# Patient Record
Sex: FEMALE | Race: WHITE | NOT HISPANIC OR LATINO | Employment: UNEMPLOYED | URBAN - METROPOLITAN AREA
[De-identification: names, ages, dates, MRNs, and addresses within clinical notes are randomized per-mention and may not be internally consistent; named-entity substitution may affect disease eponyms.]

---

## 2018-08-06 ENCOUNTER — TRANSFERRED RECORDS (OUTPATIENT)
Dept: HEALTH INFORMATION MANAGEMENT | Facility: CLINIC | Age: 48
End: 2018-08-06

## 2019-08-08 ENCOUNTER — TRANSFERRED RECORDS (OUTPATIENT)
Dept: HEALTH INFORMATION MANAGEMENT | Facility: CLINIC | Age: 49
End: 2019-08-08

## 2019-11-06 ENCOUNTER — TRANSFERRED RECORDS (OUTPATIENT)
Dept: HEALTH INFORMATION MANAGEMENT | Facility: CLINIC | Age: 49
End: 2019-11-06

## 2020-09-08 LAB — PHQ9 SCORE: 19

## 2020-10-07 ENCOUNTER — TRANSFERRED RECORDS (OUTPATIENT)
Dept: HEALTH INFORMATION MANAGEMENT | Facility: CLINIC | Age: 50
End: 2020-10-07

## 2020-10-17 ENCOUNTER — TRANSFERRED RECORDS (OUTPATIENT)
Dept: HEALTH INFORMATION MANAGEMENT | Facility: CLINIC | Age: 50
End: 2020-10-17

## 2020-10-17 LAB
HPV ABSTRACT: NORMAL
PAP-ABSTRACT: NORMAL

## 2020-11-05 ENCOUNTER — MEDICAL CORRESPONDENCE (OUTPATIENT)
Dept: HEALTH INFORMATION MANAGEMENT | Facility: CLINIC | Age: 50
End: 2020-11-05
Payer: COMMERCIAL

## 2020-11-24 ENCOUNTER — TRANSFERRED RECORDS (OUTPATIENT)
Dept: HEALTH INFORMATION MANAGEMENT | Facility: CLINIC | Age: 50
End: 2020-11-24

## 2021-01-07 ENCOUNTER — TRANSFERRED RECORDS (OUTPATIENT)
Dept: HEALTH INFORMATION MANAGEMENT | Facility: CLINIC | Age: 51
End: 2021-01-07

## 2021-01-26 ENCOUNTER — TRANSFERRED RECORDS (OUTPATIENT)
Dept: HEALTH INFORMATION MANAGEMENT | Facility: CLINIC | Age: 51
End: 2021-01-26

## 2021-03-31 ENCOUNTER — TRANSFERRED RECORDS (OUTPATIENT)
Dept: HEALTH INFORMATION MANAGEMENT | Facility: CLINIC | Age: 51
End: 2021-03-31

## 2021-04-13 ENCOUNTER — TRANSFERRED RECORDS (OUTPATIENT)
Dept: HEALTH INFORMATION MANAGEMENT | Facility: CLINIC | Age: 51
End: 2021-04-13

## 2021-06-01 ENCOUNTER — TRANSFERRED RECORDS (OUTPATIENT)
Dept: HEALTH INFORMATION MANAGEMENT | Facility: CLINIC | Age: 51
End: 2021-06-01

## 2021-06-01 LAB — EJECTION FRACTION: NORMAL %

## 2021-06-16 ENCOUNTER — TRANSFERRED RECORDS (OUTPATIENT)
Dept: HEALTH INFORMATION MANAGEMENT | Facility: CLINIC | Age: 51
End: 2021-06-16

## 2021-07-26 ENCOUNTER — OFFICE VISIT (OUTPATIENT)
Dept: FAMILY MEDICINE | Facility: CLINIC | Age: 51
End: 2021-07-26
Payer: COMMERCIAL

## 2021-07-26 VITALS
HEART RATE: 71 BPM | DIASTOLIC BLOOD PRESSURE: 62 MMHG | WEIGHT: 146.13 LBS | HEIGHT: 68 IN | OXYGEN SATURATION: 98 % | BODY MASS INDEX: 22.15 KG/M2 | SYSTOLIC BLOOD PRESSURE: 90 MMHG

## 2021-07-26 DIAGNOSIS — E03.9 ACQUIRED HYPOTHYROIDISM: ICD-10-CM

## 2021-07-26 DIAGNOSIS — G47.33 OSA (OBSTRUCTIVE SLEEP APNEA): ICD-10-CM

## 2021-07-26 DIAGNOSIS — I49.3 PVC'S (PREMATURE VENTRICULAR CONTRACTIONS): ICD-10-CM

## 2021-07-26 DIAGNOSIS — I47.10 SVT (SUPRAVENTRICULAR TACHYCARDIA) (H): ICD-10-CM

## 2021-07-26 DIAGNOSIS — R55 VASOVAGAL SYNCOPE: ICD-10-CM

## 2021-07-26 DIAGNOSIS — F51.01 PRIMARY INSOMNIA: ICD-10-CM

## 2021-07-26 DIAGNOSIS — F41.1 GAD (GENERALIZED ANXIETY DISORDER): ICD-10-CM

## 2021-07-26 DIAGNOSIS — G95.0 SYRINGOMYELIA (H): ICD-10-CM

## 2021-07-26 PROBLEM — L71.9 ROSACEA: Status: ACTIVE | Noted: 2021-07-26

## 2021-07-26 PROBLEM — G47.00 INSOMNIA: Status: ACTIVE | Noted: 2021-07-26

## 2021-07-26 PROBLEM — F45.8 BRUXISM: Status: ACTIVE | Noted: 2021-07-26

## 2021-07-26 PROBLEM — E16.2 HYPOGLYCEMIA: Status: ACTIVE | Noted: 2021-07-26

## 2021-07-26 PROBLEM — N39.3 FEMALE STRESS INCONTINENCE: Status: ACTIVE | Noted: 2020-09-24

## 2021-07-26 PROBLEM — G90.9 AUTONOMIC DYSFUNCTION: Status: ACTIVE | Noted: 2021-07-26

## 2021-07-26 PROBLEM — H43.813 PVD (POSTERIOR VITREOUS DETACHMENT), BILATERAL: Status: ACTIVE | Noted: 2021-07-26

## 2021-07-26 PROBLEM — M72.2 PLANTAR FASCIITIS: Status: ACTIVE | Noted: 2021-07-26

## 2021-07-26 PROBLEM — H93.25 AUDITORY PROCESSING DISORDER: Status: ACTIVE | Noted: 2021-07-26

## 2021-07-26 PROBLEM — K58.9 IRRITABLE BOWEL SYNDROME: Status: ACTIVE | Noted: 2021-07-26

## 2021-07-26 PROBLEM — J30.2 SEASONAL ALLERGIC RHINITIS: Status: ACTIVE | Noted: 2021-07-26

## 2021-07-26 PROCEDURE — 99204 OFFICE O/P NEW MOD 45 MIN: CPT | Performed by: STUDENT IN AN ORGANIZED HEALTH CARE EDUCATION/TRAINING PROGRAM

## 2021-07-26 RX ORDER — PROPRANOLOL HYDROCHLORIDE 10 MG/1
2.5-5 TABLET ORAL 3 TIMES DAILY PRN
COMMUNITY
End: 2021-12-31

## 2021-07-26 RX ORDER — AZELAIC ACID 0.15 G/G
GEL TOPICAL
COMMUNITY
End: 2022-09-29

## 2021-07-26 RX ORDER — HYDROCORTISONE 2.5 %
CREAM (GRAM) TOPICAL
COMMUNITY
End: 2022-09-29

## 2021-07-26 RX ORDER — INFLUENZA VIRUS VACCINE 15; 15; 15; 15 UG/.5ML; UG/.5ML; UG/.5ML; UG/.5ML
SUSPENSION INTRAMUSCULAR
COMMUNITY
End: 2021-10-13

## 2021-07-26 RX ORDER — PROGESTERONE 100 MG/1
100 CAPSULE ORAL DAILY
COMMUNITY

## 2021-07-26 RX ORDER — LEVOMEFOLATE CALCIUM 15 MG
15 TABLET ORAL DAILY
COMMUNITY
End: 2021-11-01

## 2021-07-26 RX ORDER — CHOLECALCIFEROL (VITAMIN D3) 50 MCG
50 TABLET ORAL DAILY
COMMUNITY

## 2021-07-26 RX ORDER — TRAZODONE HYDROCHLORIDE 50 MG/1
TABLET, FILM COATED ORAL
COMMUNITY

## 2021-07-26 RX ORDER — LEVOTHYROXINE SODIUM 112 MCG
112 TABLET ORAL EVERY EVENING
COMMUNITY
Start: 2021-06-19 | End: 2022-04-22

## 2021-07-26 ASSESSMENT — MIFFLIN-ST. JEOR: SCORE: 1326.32

## 2021-07-26 NOTE — PROGRESS NOTES
ASSESSMENT AND PLAN     Carmel was seen today for establish care.    Diagnoses and all orders for this visit:    NAPOLEON (generalized anxiety disorder): Patient establishing with psychiatry for ongoing management.    MICHAEL (obstructive sleep apnea)  Primary insomnia: Patient would like to establish with a sleep medicine provider for ongoing evaluation of sleep apnea.  -     SLEEP EVALUATION & MANAGEMENT REFERRAL - ADULT -; Future    SVT (supraventricular tachycardia) (H)  Vasovagal syncope: Has seen cardiology for supraventricular tachycardia.  -     Adult Cardiology Eval Referral; Future    Acquired hypothyroidism: TSH has been completed in the last year and is normal.    Syringomyelia (H): Patient had recently started physical therapy for shoulder and neck support.  She would like to continue this while she is here.  In addition patient follows with neurology  -     Physical Therapy Referral; Future  -     Adult Neurology Referral; Future    ROIs completed for specialists and primary care.  Will await records to determine when her next primary care visit should be    RTC for routine preventative care or sooner if develops new or worsening symptoms.    Dayana SINGER       Carmel Aguilar is a 50 year old  female with a PMH significant for:     Patient Active Problem List   Diagnosis     Female stress incontinence     NAPOLEON (generalized anxiety disorder)     Insomnia     Autonomic dysfunction     SVT (supraventricular tachycardia) (H)     Syncope     MICHAEL (obstructive sleep apnea)     Hypothyroidism     Irritable bowel syndrome     Syringomyelia (H)     Hypoglycemia     Rosacea     Seasonal allergic rhinitis     Bruxism     PVC's (premature ventricular contractions)     Plantar fasciitis     PVD (posterior vitreous detachment), bilateral     Auditory processing disorder     She presents to establish care.    Patient has no acute questions or concerns today.  She is recently moving back from [place].  She is looking to transfer all of her care here to [place].  Patient has a complex medical history including anxiety, SVT, syncope, autonomic dysfunction, MICHAEL, hypothyroidism, and syringomyelia.  Patient notes she is up-to-date with her current screening exams.  Patient needs referrals to specialists.  Patient has already made a visit with audiology and psychiatry.  Patient takes propranolol for anxiety and SVT.  Patient takes Synthroid.  She recently had her TSH done and notes this was normal.  Patient takes trazodone for insomnia.  Patient takes a multitude of other supplements as recommended by her functional medicine primary care provider.    PMHx, PSHx, Social Hx, Family Hx, Medications, and Allergies reviewed and updated in chart as needed.         REVIEW OF SYSTEMS     Pertinent items are noted in HPI.        OBJECTIVE     Vitals:    07/26/21 1258   BP: 90/62   BP Location: Left arm   Patient Position: Sitting   Cuff Size: Adult Regular   Pulse: 71   SpO2: 98%   Weight: 66.3 kg (146 lb 2 oz)   Height: 1.727 m (5' 8\")     Body mass index is 22.22 kg/m .    Constitutional: Awake, alert, cooperative, no apparent distress, and appears stated age.  Eyes: Lids and lashes normal, sclera clear, conjunctiva normal.  ENT: Normocephalic, without obvious abnormality, atramatic,   Musculoskeletal: No redness, warmth, or swelling of the joints.  Full range of motion noted.   Neurologic: Awake, alert, oriented to name, place and time.  Cranial nerves II-XII are grossly intact and gait is normal.    No results found for any visits on 07/26/21.      "

## 2021-07-26 NOTE — PATIENT INSTRUCTIONS
[facility] (now part of [facility])  [place] [facility]

## 2021-07-29 ENCOUNTER — HOSPITAL ENCOUNTER (OUTPATIENT)
Dept: PHYSICAL THERAPY | Facility: REHABILITATION | Age: 51
End: 2021-07-29
Attending: STUDENT IN AN ORGANIZED HEALTH CARE EDUCATION/TRAINING PROGRAM
Payer: COMMERCIAL

## 2021-07-29 DIAGNOSIS — M95.8 BILATERAL WINGED SCAPULA: ICD-10-CM

## 2021-07-29 DIAGNOSIS — R29.3 POOR POSTURE: Primary | ICD-10-CM

## 2021-07-29 DIAGNOSIS — G95.0 SYRINGOMYELIA (H): ICD-10-CM

## 2021-07-29 PROCEDURE — 97110 THERAPEUTIC EXERCISES: CPT | Mod: GP | Performed by: PHYSICAL THERAPIST

## 2021-07-29 PROCEDURE — 97162 PT EVAL MOD COMPLEX 30 MIN: CPT | Mod: GP | Performed by: PHYSICAL THERAPIST

## 2021-07-29 NOTE — PROGRESS NOTES
"   07/29/21 1300   Signing Clinician's Name / Credentials   Signing clinician's name / credentials Crys Mullins, PT   Session Number   Session Number 1/8   Ortho Goal 1   Goal Description Patient will be independent with HEP and self management of condition in 8 weeks.    Ortho Goal 2   Goal Description patient will demonstrate correct posture in standing/sitting without cues in 6 weeks.    Ortho Goal 3   Goal Description Patient will report 50% decrease in nocturnal arm symptoms in 8 weeks.    Therapeutic Procedure/exercise   Therapeutic Procedures: strength, endurance, ROM, flexibillity minutes (59693) 23   Skilled Intervention ther ex   Treatment Detail scap retraction 10 x 5\", prone LT retraining w/arm lift, 8-10 x 5-10\", rows 10 x orange, pec stretch/corner 5 x 10-15\" , (B) UT stretch    Education   Learner Patient   Readiness Acceptance   Method Booklet/handout;Demonstration   Response Demonstrates Understanding   Plan   Homework initiate HEP.    Home program scap retraction 10 x 5\", prone LT retraining w/arm lift, 8-10 x 5-10\", rows 10 x orange, pec stretch/corner 5 x 10-15\" , (B) UT stretch    Plan for next session Continue with initial POC. Progress with HEP, posture education/strengthening, additional assessment as needed.    Comments   Comments ref provider: Dayana Calle, DO   AMBULATORY CLINICS ONLY-MEDICAL AND TREATMENT DIAGNOSIS   Medical Diagnosis Syringomyelia (H)   PT Diagnosis Syringomyelia, poor posture, scapular winging     "

## 2021-07-29 NOTE — PROGRESS NOTES
21 1300   General Information   Type of Visit Initial OP Ortho PT Evaluation   Start of Care Date 21   Referring Physician Dayana Calle, DO   Patient/Family Goals Statement strength, fewer symptoms   Orders Evaluate and Treat   Date of Order 21   Certification Required? No   Medical Diagnosis Syringomyelia (H)   Surgical/Medical history reviewed Yes  No past medical history on file.  Past Surgical History:   Procedure Laterality Date      SECTION       WISDOM TOOTH EXTRACTION       Patient Active Problem List   Diagnosis     Female stress incontinence     NAPOLEON (generalized anxiety disorder)     Insomnia     Autonomic dysfunction     SVT (supraventricular tachycardia) (H)     Syncope     MICHAEL (obstructive sleep apnea)     Hypothyroidism     Irritable bowel syndrome     Syringomyelia (H)     Hypoglycemia     Rosacea     Seasonal allergic rhinitis     Bruxism     PVC's (premature ventricular contractions)     Plantar fasciitis     PVD (posterior vitreous detachment), bilateral     Auditory processing disorder        Presentation and Etiology   Pertinent history of current problem (include personal factors and/or comorbidities that impact the POC) Patient presents to therapy today with complaints of cold sensation in (B) lower legs, tingling in (B) arms at night. Hx of syringomyelia 12 years ago. Date of onset/duration of symptoms is 12 years ago. Her symptoms have changed over the years. Symptoms are intermittent. Patient reports a chronic history of similar symptoms. Patient describes their previous level of function as not limited. Symptoms worsen with at night. Leg symptoms are intermittent and sporadic/episodic.  Symptoms improve with exercise. Previous PT focusing on posture, strength. 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  Functional limitations: sleeping/arm numbness, occasional sleep disruption every 2 weeks, worse with  stress.    Impairments K. Numbness;R. Other   Impairment comment cold sensation in legs   Pain rating (0-10 point scale) Denies pain   Prior Level of Function   Prior Level of Function-Mobility not limited   Current Level of Function   Patient role/employment history B. Student   Fall Risk Screen   Fall screen completed by PT   Have you fallen 2 or more times in the past year? No   Have you fallen and had an injury in the past year? No   Is patient a fall risk? No   Abuse Screen (yes response referral indicated)   Feels Unsafe at Home or Work/School no   Feels Threatened by Someone no   Does Anyone Try to Keep You From Having Contact with Others or Doing Things Outside Your Home? no   Physical Signs of Abuse Present no   Functional Scales   Functional Scales Other   Other Scales  ROMAN/0/90, NDI 4/100   Lumbar Spine/SI Objective Findings   Ankle Dorsiflexion (L4) Strength 5 (B)   Great Toe Extension (L5) Strength 5 (B)   Ankle Plantar Flexion (S1) Strength ankle Eversion 5/5 (B)    Sensation Testing normal to pin prick (B) A/P lower leg, dorsal/plantar foot   Patellar Tendon Reflexes  normal (B)    Achilles Tendon Reflexes normal (B)    Cervical Spine   Observation (R) handed   Posture mild forward head, decreased thoracic kyphosis, decreased lumbar lordosis, (B) scap winging, (R) shoulder elevated   Cervical Flexion ROM WNL   Cervical Extension ROM WNL   Cervical Right Side Bending ROM 29 deg   Cervical Left Side Bending ROM 28 deg   Cervical Right Rotation ROM WNL   Cervical Left Rotation ROM WNL   Shoulder AROM Screen flex, abd, Ir/ext WNL (B), mod/major (B) scap winging on return with flex, abduction. Overuse of UT with scap retraction.    Cervical Flexibility Comments Mild/mod fascial restrictions of (B) ant upper rib cage, (B) thoracic inlet, cranial base.    Planned Therapy Interventions   Planned Therapy Interventions manual therapy;neuromuscular re-education;ROM;strengthening;stretching   Clinical Impression    Criteria for Skilled Therapeutic Interventions Met yes, treatment indicated   PT Diagnosis Syringomyelia, poor posture, scapular winging   Influenced by the following impairments posture deficits, scapular winging, abnormal scapular rhythm, fascial restrictions ant upper thorax,    Functional limitations due to impairments sleeping, posture    Clinical Presentation Stable/Uncomplicated   Clinical Decision Making (Complexity) Moderate complexity   Therapy Frequency 1 time/week   Predicted Duration of Therapy Intervention (days/wks) 6-8 weeks   Risk & Benefits of therapy have been explained Yes   Patient, Family & other staff in agreement with plan of care Yes   Ortho Goal 1   Goal Description Patient will be independent with HEP and self management of condition in 8 weeks.    Ortho Goal 2   Goal Description patient will demonstrate correct posture in standing/sitting without cues in 6 weeks.    Ortho Goal 3   Goal Description Patient will report 50% decrease in nocturnal arm symptoms in 8 weeks.    Total Evaluation Time   PT Eval, Moderate Complexity Minutes (65324) 40

## 2021-07-31 ENCOUNTER — TELEPHONE (OUTPATIENT)
Dept: FAMILY MEDICINE | Facility: CLINIC | Age: 51
End: 2021-07-31

## 2021-08-23 ENCOUNTER — NURSE TRIAGE (OUTPATIENT)
Dept: NURSING | Facility: CLINIC | Age: 51
End: 2021-08-23

## 2021-08-23 ENCOUNTER — OFFICE VISIT (OUTPATIENT)
Dept: URGENT CARE | Facility: URGENT CARE | Age: 51
End: 2021-08-23
Payer: COMMERCIAL

## 2021-08-23 VITALS
SYSTOLIC BLOOD PRESSURE: 106 MMHG | HEART RATE: 69 BPM | WEIGHT: 146 LBS | BODY MASS INDEX: 22.2 KG/M2 | TEMPERATURE: 99 F | OXYGEN SATURATION: 99 % | DIASTOLIC BLOOD PRESSURE: 69 MMHG

## 2021-08-23 DIAGNOSIS — J01.40 ACUTE NON-RECURRENT PANSINUSITIS: Primary | ICD-10-CM

## 2021-08-23 PROCEDURE — 99213 OFFICE O/P EST LOW 20 MIN: CPT | Performed by: PHYSICIAN ASSISTANT

## 2021-08-23 RX ORDER — GUAIFENESIN 600 MG/1
1200 TABLET, EXTENDED RELEASE ORAL 2 TIMES DAILY
Qty: 40 TABLET | Refills: 0 | Status: SHIPPED | OUTPATIENT
Start: 2021-08-23 | End: 2021-09-02

## 2021-08-23 RX ORDER — PREDNISONE 20 MG/1
TABLET ORAL
Qty: 20 TABLET | Refills: 0 | Status: SHIPPED | OUTPATIENT
Start: 2021-08-23 | End: 2021-09-16

## 2021-08-23 NOTE — PATIENT INSTRUCTIONS
Patient Education     Understanding Acute Rhinosinusitis  Acute rhinosinusitis is when the lining of the inside of the nose and the sinuses becomes irritated and swollen. It is also called sinusitis, or a sinus infection.  Sinuses are air-filled spaces in the skull behind the face. They are kept moist and clean by a lining of mucosa. Things such as pollen, smoke, and chemical fumes can irritate the mucosa. It can then swell up. As a response to irritation, the mucosa makes more mucus and other fluids. Tiny hairlike cilia cover the mucosa. Cilia help carry mucus toward the opening of the sinus. Too much mucus may cause the cilia to stop working. This blocks the sinus opening. A buildup of fluid in the sinuses then causes pain and pressure. It can also cause bacteria to grow in the sinuses.    What causes acute rhinosinusitis?  A sinus infection is most often caused by a virus. You are more likely to get one after having a cold or the flu. In some cases, a sinus infection can be caused by bacteria.  You are at higher risk for a sinus infection if you:    Are older in age    Have structural problems with your sinuses    Smoke or are exposed to secondhand smoke    Are exposed to changes in pressure, such as from flying a lot or deep sea diving    Have asthma or allergies    Have a weak immune system    Have dental disease     Symptoms of acute rhinosinusitis  Symptoms of acute rhinosinusitis often last around 7 to 10 days. If you have a bacterial infection, they may last longer. They may also get better but then worsen. You may have:    Face pain or pressure under the eyes and around the nose    Headache    Fluid draining in the back of the throat (postnasal drip)    Congestion    Drainage that is thick and colored (often green), instead of clear    Cough    Problems with your sense of smell    Ear pain or hearing problems    Fever    Tooth pain    Fatigue  Diagnosing acute rhinosinusitis  Your healthcare provider  will ask about your symptoms and past health. He or she will look at your ears, nose, throat, and sinuses. Imaging tests, such as X-rays, are often not needed.  It can be hard to figure out if a sinus infection is caused by a virus or bacterium. A bacterial infection tends to last longer. Symptoms may also get better but then worsen. Your healthcare provider may take a sample of mucus from your nose to check for bacteria.  Treating acute rhinosinusitis  Most sinus infections will go away within 10 days. Your body will fight off the virus. If your symptoms seem to get better but then worsen, you may have a bacterial infection instead. Your healthcare provider will then give you antibiotics. Take this medicine until it is gone, even if you feel better.  To help ease your symptoms, your healthcare provider may advise:    Over-the-counter pain relievers. Medicines such as acetaminophen or ibuprofen can ease sinus pain. They may also lower a fever.    Nasal washes. Washing your nasal passages with salt water may ease pain and pressure. It can rinse out mucous and other irritants from your sinuses. Your healthcare provider can show you how to do it.    Nasal steroid spray. This prescription medicine can reduce inflammation in your sinuses.    Other medicines. Decongestants, antihistamines, and other nasal sprays may give short-term relief. They may help with congestion. Talk with your healthcare provider before taking these medicines.     Preventing acute rhinosinusitis  You can help prevent a sinus infection with these steps:    Wash your hands well and often.    Stay away from people who have a cold or upper respiratory infection.    Don't smoke. And stay away from secondhand smoke.    Use a humidifier at home.    Make sure you are up-to-date on your vaccines, such as the flu shot.     When to call your healthcare provider  Call your healthcare provider right away if you have any of these:    Fever of 100.4 F (38 C) or  higher, or as directed by your healthcare provider    Pain that gets worse    Symptoms that don t get better, or get worse    New symptoms  Isaiah last reviewed this educational content on 6/1/2019 2000-2021 [employer]. All rights reserved. This information is not intended as a substitute for professional medical care. Always follow your healthcare professional's instructions.

## 2021-08-23 NOTE — PROGRESS NOTES
Acute non-recurrent pansinusitis  - predniSONE (DELTASONE) 20 MG tablet; Take 3 tabs by mouth daily x 3 days, then 2 tabs daily x 3 days, then 1 tab daily x 3 days, then 1/2 tab daily x 3 days.  - guaiFENesin (MUCINEX) 600 MG 12 hr tablet; Take 2 tablets (1,200 mg) by mouth 2 times daily for 10 days  - amoxicillin-clavulanate (AUGMENTIN) 875-125 MG tablet; Take 1 tablet by mouth 2 times daily for 10 days    20 minutes spent on the date of the encounter doing chart review, history and exam, documentation and further activities per the note     See Patient Instructions  Patient Instructions     Patient Education     Understanding Acute Rhinosinusitis  Acute rhinosinusitis is when the lining of the inside of the nose and the sinuses becomes irritated and swollen. It is also called sinusitis, or a sinus infection.  Sinuses are air-filled spaces in the skull behind the face. They are kept moist and clean by a lining of mucosa. Things such as pollen, smoke, and chemical fumes can irritate the mucosa. It can then swell up. As a response to irritation, the mucosa makes more mucus and other fluids. Tiny hairlike cilia cover the mucosa. Cilia help carry mucus toward the opening of the sinus. Too much mucus may cause the cilia to stop working. This blocks the sinus opening. A buildup of fluid in the sinuses then causes pain and pressure. It can also cause bacteria to grow in the sinuses.    What causes acute rhinosinusitis?  A sinus infection is most often caused by a virus. You are more likely to get one after having a cold or the flu. In some cases, a sinus infection can be caused by bacteria.  You are at higher risk for a sinus infection if you:    Are older in age    Have structural problems with your sinuses    Smoke or are exposed to secondhand smoke    Are exposed to changes in pressure, such as from flying a lot or deep sea diving    Have asthma or allergies    Have a weak immune system    Have dental  disease     Symptoms of acute rhinosinusitis  Symptoms of acute rhinosinusitis often last around 7 to 10 days. If you have a bacterial infection, they may last longer. They may also get better but then worsen. You may have:    Face pain or pressure under the eyes and around the nose    Headache    Fluid draining in the back of the throat (postnasal drip)    Congestion    Drainage that is thick and colored (often green), instead of clear    Cough    Problems with your sense of smell    Ear pain or hearing problems    Fever    Tooth pain    Fatigue  Diagnosing acute rhinosinusitis  Your healthcare provider will ask about your symptoms and past health. He or she will look at your ears, nose, throat, and sinuses. Imaging tests, such as X-rays, are often not needed.  It can be hard to figure out if a sinus infection is caused by a virus or bacterium. A bacterial infection tends to last longer. Symptoms may also get better but then worsen. Your healthcare provider may take a sample of mucus from your nose to check for bacteria.  Treating acute rhinosinusitis  Most sinus infections will go away within 10 days. Your body will fight off the virus. If your symptoms seem to get better but then worsen, you may have a bacterial infection instead. Your healthcare provider will then give you antibiotics. Take this medicine until it is gone, even if you feel better.  To help ease your symptoms, your healthcare provider may advise:    Over-the-counter pain relievers. Medicines such as acetaminophen or ibuprofen can ease sinus pain. They may also lower a fever.    Nasal washes. Washing your nasal passages with salt water may ease pain and pressure. It can rinse out mucous and other irritants from your sinuses. Your healthcare provider can show you how to do it.    Nasal steroid spray. This prescription medicine can reduce inflammation in your sinuses.    Other medicines. Decongestants, antihistamines, and other nasal sprays may give  short-term relief. They may help with congestion. Talk with your healthcare provider before taking these medicines.     Preventing acute rhinosinusitis  You can help prevent a sinus infection with these steps:    Wash your hands well and often.    Stay away from people who have a cold or upper respiratory infection.    Don't smoke. And stay away from secondhand smoke.    Use a humidifier at home.    Make sure you are up-to-date on your vaccines, such as the flu shot.     When to call your healthcare provider  Call your healthcare provider right away if you have any of these:    Fever of 100.4 F (38 C) or higher, or as directed by your healthcare provider    Pain that gets worse    Symptoms that don t get better, or get worse    New symptoms  [employer] software last reviewed this educational content on 6/1/2019 2000-2021 [employer]. All rights reserved. This information is not intended as a substitute for professional medical care. Always follow your healthcare professional's instructions.               Merrill Isbell PA-C  [facility]    Subjective   50 year old who presents to clinic today for the following health issues:    Urgent Care and Sinus Problem       HPI     Acute Illness  Acute illness concerns: Ear plugged, nasal drainage, sinus pressure.   Onset/Duration: 2-3 weeks  Symptoms:  Fever: no  Chills/Sweats: no  Headache (location?): YES  Sinus Pressure: YES  Conjunctivitis:  no  Ear Pain: YES: bilateral  Rhinorrhea: YES  Congestion: YES  Sore Throat: no  Cough: no  Wheeze: no  Decreased Appetite: no  Nausea: no  Vomiting: no  Diarrhea: no  Dysuria/Freq.: no  Dysuria or Hematuria: no  Fatigue/Achiness: YES  Sick/Strep Exposure: None known- Possibly at the [employer]   Therapies tried and outcome: Tylenol     Review of Systems   Review of Systems   See HPI     Objective    Temp: 99  F (37.2  C) Temp src: Tympanic BP: 106/69 Pulse: 69     SpO2: 99 %       Physical Exam   Physical  Exam  Constitutional:       General: She is not in acute distress.     Appearance: Normal appearance. She is normal weight. She is not ill-appearing, toxic-appearing or diaphoretic.   HENT:      Head: Normocephalic and atraumatic.      Right Ear: Tympanic membrane, ear canal and external ear normal. There is no impacted cerumen.      Left Ear: Tympanic membrane, ear canal and external ear normal. There is no impacted cerumen.      Nose: Congestion present.      Mouth/Throat:      Mouth: Mucous membranes are moist.      Pharynx: Oropharynx is clear. No oropharyngeal exudate or posterior oropharyngeal erythema.   Eyes:      General:         Right eye: No discharge.         Left eye: No discharge.      Extraocular Movements: Extraocular movements intact.      Conjunctiva/sclera: Conjunctivae normal.      Pupils: Pupils are equal, round, and reactive to light.   Cardiovascular:      Rate and Rhythm: Normal rate.      Pulses: Normal pulses.      Heart sounds: Normal heart sounds. No murmur heard.   No friction rub. No gallop.    Pulmonary:      Effort: Pulmonary effort is normal. No respiratory distress.      Breath sounds: No stridor. No wheezing, rhonchi or rales.   Chest:      Chest wall: No tenderness.   Abdominal:      General: Abdomen is flat. Bowel sounds are normal. There is no distension.      Palpations: Abdomen is soft. There is no mass.      Tenderness: There is no abdominal tenderness. There is no right CVA tenderness, left CVA tenderness, guarding or rebound.      Hernia: No hernia is present.   Musculoskeletal:      Cervical back: Normal range of motion and neck supple. No tenderness.   Lymphadenopathy:      Cervical: No cervical adenopathy.   Neurological:      General: No focal deficit present.      Mental Status: She is alert and oriented to person, place, and time. Mental status is at baseline.      Gait: Gait normal.   Psychiatric:         Mood and Affect: Mood normal.         Behavior: Behavior  normal.         Thought Content: Thought content normal.         Judgment: Judgment normal.          No results found for this or any previous visit (from the past 24 hour(s)).

## 2021-08-23 NOTE — TELEPHONE ENCOUNTER
"Pt called in states she has headache.  Pt is states the pain forehead and behind her eye.  Pt had nasal drainage.  The color of drainage is yellow.  The headache started last 3 weeks.  Her ear is plugged sometimes.  The headache is 5/10 on the scale.  The symptom is come and go.  Has history of allergy.  Has history of migraine headache.  No fever, no stiff neck.  Eye discomfort.  No cold symptoms.  The disposition is to be seen with in 24 hours.  Care advice given per protocol.  The mother states she will take the Pt to the [facility] tomorrow.  Patient agrees with care advice given.   Agreed to call back if he has additional symptoms or questions.      Jesse Munoz Joppa Nurse Advisor 8/23/2021 3:40 PM        Reason for Disposition    [1] Sinus pain of forehead AND [2] yellow or green nasal discharge    Additional Information    Negative: Difficult to awaken or acting confused (e.g., disoriented, slurred speech)    Negative: [1] Weakness of the face, arm or leg on one side of the body AND [2] new onset    Negative: [1] Numbness of the face, arm or leg on one side of the body AND [2] new onset    Negative: [1] Loss of speech or garbled speech AND [2] new onset    Negative: Passed out (i.e., lost consciousness, collapsed and was not responding)    Negative: Sounds like a life-threatening emergency to the triager    Negative: Followed a head injury    Negative: Pregnant    Negative: Postpartum (from 0 to 6 weeks after delivery)    Negative: Traumatic Brain Injury (TBI) is suspected    Negative: Unable to walk, or can only walk with assistance (e.g., requires support)    Negative: Stiff neck (can't touch chin to chest)    Negative: Severe pain in one eye    Negative: [1] Other family members (or roommates) with headaches AND [2] possibility of carbon monoxide exposure    Negative: [1] SEVERE headache (e.g., excruciating) AND [2] \"worst headache\" of life    Negative: [1] SEVERE headache AND [2] sudden-onset (i.e., reaching " maximum intensity within seconds)    Negative: [1] SEVERE headache AND [2] fever    Negative: Loss of vision or double vision (Exception: same as prior migraines)    Negative: [1] Fever > 100.0 F (37.8 C) AND [2] diabetes mellitus or weak immune system (e.g., HIV positive, cancer chemo, splenectomy, organ transplant, chronic steroids)    Negative: Patient sounds very sick or weak to the triager    Negative: [1] SEVERE headache (e.g., excruciating) AND [2] not improved after 2 hours of pain medicine    Negative: [1] Vomiting AND [2] 2 or more times (Exception: similar to previous migraines)    Negative: Fever > 104 F (40 C)    Negative: [1] MODERATE headache (e.g., interferes with normal activities) AND [2] present > 24 hours AND [3] unexplained  (Exceptions: analgesics not tried, typical migraine, or headache part of viral illness)    Negative: [1] New headache AND [2] weak immune system (e.g., HIV positive, cancer chemo, splenectomy, organ transplant, chronic steroids)    Negative: [1] New headache AND [2] age > 50    Protocols used: HEADACHE-A-AH

## 2021-08-24 ENCOUNTER — NURSE TRIAGE (OUTPATIENT)
Dept: NURSING | Facility: CLINIC | Age: 51
End: 2021-08-24

## 2021-08-24 NOTE — TELEPHONE ENCOUNTER
Seen yesterday in  and given medication for sinusitis.   Back of throat there is a red bump, pea sized and redness on the skin flap near uvula is reddened. Clogged ears.   Pain on the right side in the ear on and off and not new. She is asking if the provider saw the red bump yesterday.     Per notes the exam yesterday noted this:Mouth/Throat:      Mouth: Mucous membranes are moist.      Pharynx: Oropharynx is clear. No oropharyngeal exudate or posterior oropharyngeal erythema.   Information reviewed with patient.   Advised if she starts antibiotics to also use a probiotic found in yogurt, sauerkraut or kombucha.    Advised to follow up is not improving.    Rosana Caban RN on 8/24/2021 at 11:08 AM        Additional Information    Information only question and nurse able to answer    Protocols used: INFORMATION ONLY CALL - NO TRIAGE-A-OH

## 2021-08-25 ENCOUNTER — OFFICE VISIT (OUTPATIENT)
Dept: CARDIOLOGY | Facility: CLINIC | Age: 51
End: 2021-08-25
Payer: COMMERCIAL

## 2021-08-25 VITALS
SYSTOLIC BLOOD PRESSURE: 88 MMHG | HEART RATE: 70 BPM | WEIGHT: 145 LBS | OXYGEN SATURATION: 97 % | DIASTOLIC BLOOD PRESSURE: 58 MMHG | BODY MASS INDEX: 22.05 KG/M2

## 2021-08-25 DIAGNOSIS — R55 VASOVAGAL SYNCOPE: ICD-10-CM

## 2021-08-25 DIAGNOSIS — I49.3 PVC'S (PREMATURE VENTRICULAR CONTRACTIONS): ICD-10-CM

## 2021-08-25 DIAGNOSIS — I47.10 SVT (SUPRAVENTRICULAR TACHYCARDIA) (H): ICD-10-CM

## 2021-08-25 PROCEDURE — 99204 OFFICE O/P NEW MOD 45 MIN: CPT | Performed by: INTERNAL MEDICINE

## 2021-08-25 NOTE — PATIENT INSTRUCTIONS
It was a pleasure seeing you at [facility] today.        Here are my suggestions for your care:    1. Brisk walk 30 minutes daily    2. Drink at least 2 liters of water per day    3. Sign release form so we can get records from [place]      Let's meet again in 6 months.    You can always call my nurse Terese Flores RN who is a nurse helping me in the care of my patients. She can be reached at [phone] if you have any questions.    For scheduling, please call my  Nat Cano at [phone].    Thank you again for trusting me with your care. Please feel free to call my office at any time if you have any question or if I can assist you in any way.    Arti Escobar MD  [facility]

## 2021-08-25 NOTE — LETTER
8/25/2021    Dayana Calle, DO  [address] [address]    RE: Carmel Aguilar       Dear Colleague,    I had the pleasure of seeing Carmel Aguilar in the [facility].      HEART CARE ENCOUNTER CONSULTATON NOTE      [facility]  [phone]      Assessment/Recommendations   Assessment/Plan:  SVT - will get records from [place]. It seems like her palpitations are minimally symptomatic. No change to her current propranolol regimen for now.    Venous insufficiency - compression socks prn, exercise    Risk modification - recommended 30 minutes of cardiovascular exercise daily and a mediterranean diet    F/U 1 year       History of Present Illness/Subjective    HPI: Carmel Aguilar is a 50 year old female with NAPOLEON, mild MICHAEL, and a history of vasovagal reactions without syncope who comes to establish with cardiology today. Carmel recently moved from [place] after getting a divorce. She is originally from [place] and has a brother who lives in [place]. 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 Carmel notes that around 2019 she started seeing a cardiologist in [place], Dr. Grant Soto ([phone]). She initially went to see him after an EKG showed an abnormal interval, per her recall. A holter showed SVT and she reports a normal echocardiogram. Dr. Soto was not worried about her SVT and followed with her annually. Carmel has been on propranolol for her anxiety and feels like it helps with her palpitations as well. She notes brief, a few seconds, of palpitations a few times per month. There are no associated symptoms. She has not been exercising as much with the move and school and feels like she is getting out of shape.     Carmel denies syncope, chest pain, pnd/orthopnea. She does have some mild dependent edema and will wear compression socks from time to time.        Physical Examination  Review of  Systems   Vitals: BP (!) 88/58 (BP Location: Right arm)   Pulse 70   Wt 65.8 kg (145 lb)   SpO2 97%   BMI 22.05 kg/m    BMI= Body mass index is 22.05 kg/m .  Wt Readings from Last 3 Encounters:   21 65.8 kg (145 lb)   21 66.2 kg (146 lb)   21 66.3 kg (146 lb 2 oz)       General Appearance:   no distress, normal body habitus   ENT/Mouth: membranes moist, no oral lesions or bleeding gums.      EYES:  no scleral icterus, normal conjunctivae   Neck: no carotid bruits or thyromegaly   Chest/Lungs:   lungs are clear to auscultation, no rales or wheezing, no sternal scar, equal chest wall expansion    Cardiovascular:   Regular. Normal first and second heart sounds with no murmur. No rubs or gallops; the right carotid, radial and posterior tibial pulses are intact and the left carotid, radial and posterior tibial pulses are intact.  Jugular venous pressure is flat, trace edema bilaterally    Abdomen:  no organomegaly, masses, bruits, or tenderness; bowel sounds are present   Extremities: no cyanosis or clubbing   Skin: no xanthelasma, warm.    Neurologic: normal  bilateral, no tremors     Psychiatric: alert and oriented x3, calm        Please refer above for cardiac ROS details.        Medical History  Surgical History Family History Social History   Past Medical History:   Diagnosis Date     NAPOLEON (generalized anxiety disorder)      Hypothyroid      IBS (irritable bowel syndrome)      mild MICHAEL (obstructive sleep apnea)      SVT (supraventricular tachycardia) (H)      Vasovagal reaction      Past Surgical History:   Procedure Laterality Date      SECTION       WISDOM TOOTH EXTRACTION       Family History   Problem Relation Age of Onset     Atrial fibrillation Father      Cerebrovascular Disease Father      Dementia Father      Ovarian Cancer Mother      Hypertension Mother      Sleep Apnea Brother      Myocardial Infarction Maternal Grandmother      Cancer Paternal Grandfather      Colon  Cancer No family hx of      Breast Cancer No family hx of         Social History     Socioeconomic History     Marital status:      Spouse name: Not on file     Number of children: Not on file     Years of education: Not on file     Highest education level: Not on file   Occupational History     Occupation: homemaker     Occupation: student:    Tobacco Use     Smoking status: Never Smoker     Smokeless tobacco: Never Used   Substance and Sexual Activity     Alcohol use: Not Currently     Drug use: Not Currently     Sexual activity: Not Currently   Other Topics Concern     Parent/sibling w/ CABG, MI or angioplasty before 65F 55M? Not Asked   Social History Narrative     Not on file     Social Determinants of Health     Financial Resource Strain:      Difficulty of Paying Living Expenses:    Food Insecurity:      Worried About Running Out of Food in the Last Year:      Ran Out of Food in the Last Year:    Transportation Needs:      Lack of Transportation (Medical):      Lack of Transportation (Non-Medical):    Physical Activity:      Days of Exercise per Week:      Minutes of Exercise per Session:    Stress:      Feeling of Stress :    Social Connections:      Frequency of Communication with Friends and Family:      Frequency of Social Gatherings with Friends and Family:      Attends Christian Services:      Active Member of Clubs or Organizations:      Attends Club or Organization Meetings:      Marital Status:    Intimate Partner Violence:      Fear of Current or Ex-Partner:      Emotionally Abused:      Physically Abused:      Sexually Abused:            Medications  Allergies   Current Outpatient Medications   Medication Sig Dispense Refill     amoxicillin-clavulanate (AUGMENTIN) 875-125 MG tablet Take 1 tablet by mouth 2 times daily for 10 days 20 tablet 0     azelaic acid (FINACIA) 15 % external gel azelaic acid 15 % topical gel   APPLY TO AFFECTED AREA EVERY DAY       guaiFENesin (MUCINEX)  600 MG 12 hr tablet Take 2 tablets (1,200 mg) by mouth 2 times daily for 10 days (Patient taking differently: Take 1,200 mg by mouth 2 times daily Hasn't started) 40 tablet 0     hydrocortisone 2.5 % cream hydrocortisone 2.5 % topical cream       L-Methylfolate (ELFOLATE) 15 MG TABS Take 15 mg by mouth daily       MAGNESIUM GLYCINATE PO Take by mouth daily        progesterone (PROMETRIUM) 100 MG capsule Take 100 mg by mouth daily Takes at dinner       propranolol (INDERAL) 10 MG tablet Take 2.5-5 mg by mouth 3 times daily as needed        SYNTHROID 112 MCG tablet Take 112 mcg by mouth every evening        traZODone (DESYREL) 50 MG tablet trazodone 50 mg tablet   TAKE 1/2 TO 1 TABLET BY MOUTH AT BEDTIME       UNABLE TO FIND MEDICATION NAME: EFA-Sirt Supreme dietary supplements       UNABLE TO FIND MEDICATION NAME: Bi-estrogen (compound) BID       UNABLE TO FIND MEDICATION NAME: Zinc & Copper 15mg       vitamin D3 (VITAMIN D3) 50 mcg (2000 units) tablet Take 50 mcg by mouth daily 2000 units        influenza quadrivalent, PF, vacc (FLULAVAL QUADRIVALENT) 0.5 ML injection Flulaval Quad [id number] (PF) 60 mcg (15 mcg x 4)/0.5 mL IM syringe (Patient not taking: Reported on 8/25/2021)       predniSONE (DELTASONE) 20 MG tablet Take 3 tabs by mouth daily x 3 days, then 2 tabs daily x 3 days, then 1 tab daily x 3 days, then 1/2 tab daily x 3 days. (Patient not taking: Reported on 8/25/2021) 20 tablet 0       Allergies   Allergen Reactions     Melon      Oral reaction      Mold      Morphine Nausea and Vomiting     Nsaids Hives     Other Environmental Allergy      Tomato Hives          Lab Results    Chemistry/lipid CBC Cardiac Enzymes/BNP/TSH/INR   No results for input(s): CHOL, HDL, LDL, TRIG, CHOLHDLRATIO in the last 14523 hours.  No results for input(s): LDL in the last 14216 hours.  No results for input(s): NA, POTASSIUM, CHLORIDE, CO2, GLC, BUN, CR, GFRESTIMATED, ERICH in the last 74410 hours.    Invalid input(s):  GRFESTBLACK  No results for input(s): CR in the last 78463 hours.  No results for input(s): A1C in the last 14915 hours.       No results for input(s): WBC, HGB, HCT, MCV, PLT in the last 46570 hours.  No results for input(s): HGB in the last 45043 hours. No results for input(s): TROPONINI in the last 48797 hours.  No results for input(s): BNP, NTBNPI, NTBNP in the last 46923 hours.  No results for input(s): TSH in the last 59866 hours.  No results for input(s): INR in the last 86834 hours.     Arti Escobar MD                                        Thank you for allowing me to participate in the care of your patient.      Sincerely,     Arti Escobar MD     [facility]  cc:   Dayana Calle, DO  [address] [id number]

## 2021-08-25 NOTE — PROGRESS NOTES
HEART CARE ENCOUNTER CONSULTATON NOTE      [facility]  [phone]      Assessment/Recommendations   Assessment/Plan:  SVT - will get records from [place]. It seems like her palpitations are minimally symptomatic. No change to her current propranolol regimen for now.    Venous insufficiency - compression socks prn, exercise    Risk modification - recommended 30 minutes of cardiovascular exercise daily and a mediterranean diet    F/U 1 year       History of Present Illness/Subjective    HPI: Carmel Aguilar is a 50 year old female with NAPOLEON, mild MICHAEL, and a history of vasovagal reactions without syncope who comes to establish with cardiology today. Carmel recently moved from [place] after getting a divorce. She is originally from [place] and has a brother who lives in [place]. 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 Carmel notes that around 2019 she started seeing a cardiologist in [place], Dr. Grant Soto ([phone]). She initially went to see him after an EKG showed an abnormal interval, per her recall. A holter showed SVT and she reports a normal echocardiogram. Dr. Soto was not worried about her SVT and followed with her annually. Carmel has been on propranolol for her anxiety and feels like it helps with her palpitations as well. She notes brief, a few seconds, of palpitations a few times per month. There are no associated symptoms. She has not been exercising as much with the move and school and feels like she is getting out of shape.     Carmel denies syncope, chest pain, pnd/orthopnea. She does have some mild dependent edema and will wear compression socks from time to time.        Physical Examination  Review of Systems   Vitals: BP (!) 88/58 (BP Location: Right arm)   Pulse 70   Wt 65.8 kg (145 lb)   SpO2 97%   BMI 22.05 kg/m    BMI= Body mass index is 22.05 kg/m .  Wt Readings from Last 3 Encounters:   08/25/21 65.8 kg (145 lb)   08/23/21 66.2 kg (146 lb)    21 66.3 kg (146 lb 2 oz)       General Appearance:   no distress, normal body habitus   ENT/Mouth: membranes moist, no oral lesions or bleeding gums.      EYES:  no scleral icterus, normal conjunctivae   Neck: no carotid bruits or thyromegaly   Chest/Lungs:   lungs are clear to auscultation, no rales or wheezing, no sternal scar, equal chest wall expansion    Cardiovascular:   Regular. Normal first and second heart sounds with no murmur. No rubs or gallops; the right carotid, radial and posterior tibial pulses are intact and the left carotid, radial and posterior tibial pulses are intact.  Jugular venous pressure is flat, trace edema bilaterally    Abdomen:  no organomegaly, masses, bruits, or tenderness; bowel sounds are present   Extremities: no cyanosis or clubbing   Skin: no xanthelasma, warm.    Neurologic: normal  bilateral, no tremors     Psychiatric: alert and oriented x3, calm        Please refer above for cardiac ROS details.        Medical History  Surgical History Family History Social History   Past Medical History:   Diagnosis Date     NAPOLEON (generalized anxiety disorder)      Hypothyroid      IBS (irritable bowel syndrome)      mild MICHAEL (obstructive sleep apnea)      SVT (supraventricular tachycardia) (H)      Vasovagal reaction      Past Surgical History:   Procedure Laterality Date      SECTION       WISDOM TOOTH EXTRACTION       Family History   Problem Relation Age of Onset     Atrial fibrillation Father      Cerebrovascular Disease Father      Dementia Father      Ovarian Cancer Mother      Hypertension Mother      Sleep Apnea Brother      Myocardial Infarction Maternal Grandmother      Cancer Paternal Grandfather      Colon Cancer No family hx of      Breast Cancer No family hx of         Social History     Socioeconomic History     Marital status:      Spouse name: Not on file     Number of children: Not on file     Years of education: Not on file     Highest education  level: Not on file   Occupational History     Occupation: homemaker     Occupation: student:    Tobacco Use     Smoking status: Never Smoker     Smokeless tobacco: Never Used   Substance and Sexual Activity     Alcohol use: Not Currently     Drug use: Not Currently     Sexual activity: Not Currently   Other Topics Concern     Parent/sibling w/ CABG, MI or angioplasty before 65F 55M? Not Asked   Social History Narrative     Not on file     Social Determinants of Health     Financial Resource Strain:      Difficulty of Paying Living Expenses:    Food Insecurity:      Worried About Running Out of Food in the Last Year:      Ran Out of Food in the Last Year:    Transportation Needs:      Lack of Transportation (Medical):      Lack of Transportation (Non-Medical):    Physical Activity:      Days of Exercise per Week:      Minutes of Exercise per Session:    Stress:      Feeling of Stress :    Social Connections:      Frequency of Communication with Friends and Family:      Frequency of Social Gatherings with Friends and Family:      Attends Hinduism Services:      Active Member of Clubs or Organizations:      Attends Club or Organization Meetings:      Marital Status:    Intimate Partner Violence:      Fear of Current or Ex-Partner:      Emotionally Abused:      Physically Abused:      Sexually Abused:            Medications  Allergies   Current Outpatient Medications   Medication Sig Dispense Refill     amoxicillin-clavulanate (AUGMENTIN) 875-125 MG tablet Take 1 tablet by mouth 2 times daily for 10 days 20 tablet 0     azelaic acid (FINACIA) 15 % external gel azelaic acid 15 % topical gel   APPLY TO AFFECTED AREA EVERY DAY       guaiFENesin (MUCINEX) 600 MG 12 hr tablet Take 2 tablets (1,200 mg) by mouth 2 times daily for 10 days (Patient taking differently: Take 1,200 mg by mouth 2 times daily Hasn't started) 40 tablet 0     hydrocortisone 2.5 % cream hydrocortisone 2.5 % topical cream       L-Methylfolate  (ELFOLATE) 15 MG TABS Take 15 mg by mouth daily       MAGNESIUM GLYCINATE PO Take by mouth daily        progesterone (PROMETRIUM) 100 MG capsule Take 100 mg by mouth daily Takes at dinner       propranolol (INDERAL) 10 MG tablet Take 2.5-5 mg by mouth 3 times daily as needed        SYNTHROID 112 MCG tablet Take 112 mcg by mouth every evening        traZODone (DESYREL) 50 MG tablet trazodone 50 mg tablet   TAKE 1/2 TO 1 TABLET BY MOUTH AT BEDTIME       UNABLE TO FIND MEDICATION NAME: EFA-Sirt Supreme dietary supplements       UNABLE TO FIND MEDICATION NAME: Bi-estrogen (compound) BID       UNABLE TO FIND MEDICATION NAME: Zinc & Copper 15mg       vitamin D3 (VITAMIN D3) 50 mcg (2000 units) tablet Take 50 mcg by mouth daily 2000 units        influenza quadrivalent, PF, vacc (FLULAVAL QUADRIVALENT) 0.5 ML injection Flulaval Quad [id number] (PF) 60 mcg (15 mcg x 4)/0.5 mL IM syringe (Patient not taking: Reported on 8/25/2021)       predniSONE (DELTASONE) 20 MG tablet Take 3 tabs by mouth daily x 3 days, then 2 tabs daily x 3 days, then 1 tab daily x 3 days, then 1/2 tab daily x 3 days. (Patient not taking: Reported on 8/25/2021) 20 tablet 0       Allergies   Allergen Reactions     Melon      Oral reaction      Mold      Morphine Nausea and Vomiting     Nsaids Hives     Other Environmental Allergy      Tomato Hives          Lab Results    Chemistry/lipid CBC Cardiac Enzymes/BNP/TSH/INR   No results for input(s): CHOL, HDL, LDL, TRIG, CHOLHDLRATIO in the last 81391 hours.  No results for input(s): LDL in the last 20216 hours.  No results for input(s): NA, POTASSIUM, CHLORIDE, CO2, GLC, BUN, CR, GFRESTIMATED, ERICH in the last 20888 hours.    Invalid input(s): GRFESTBLACK  No results for input(s): CR in the last 11320 hours.  No results for input(s): A1C in the last 21684 hours.       No results for input(s): WBC, HGB, HCT, MCV, PLT in the last 92571 hours.  No results for input(s): HGB in the last 38383 hours. No results for  input(s): TROPONINI in the last 32000 hours.  No results for input(s): BNP, NTBNPI, NTBNP in the last 99502 hours.  No results for input(s): TSH in the last 93818 hours.  No results for input(s): INR in the last 66106 hours.     Arti Escobar MD

## 2021-08-27 ENCOUNTER — HOSPITAL ENCOUNTER (OUTPATIENT)
Dept: PHYSICAL THERAPY | Facility: REHABILITATION | Age: 51
End: 2021-08-27
Payer: COMMERCIAL

## 2021-08-27 DIAGNOSIS — R29.3 POOR POSTURE: ICD-10-CM

## 2021-08-27 DIAGNOSIS — G95.0 SYRINGOMYELIA (H): Primary | ICD-10-CM

## 2021-08-27 DIAGNOSIS — M95.8 BILATERAL WINGED SCAPULA: ICD-10-CM

## 2021-08-27 PROCEDURE — 97110 THERAPEUTIC EXERCISES: CPT | Mod: GP | Performed by: PHYSICAL THERAPIST

## 2021-09-05 ENCOUNTER — HEALTH MAINTENANCE LETTER (OUTPATIENT)
Age: 51
End: 2021-09-05

## 2021-09-09 ENCOUNTER — TELEPHONE (OUTPATIENT)
Dept: SLEEP MEDICINE | Facility: CLINIC | Age: 51
End: 2021-09-09

## 2021-09-09 NOTE — TELEPHONE ENCOUNTER
Request for sleep study records have been faxed today to [facility] today: Consultation is scheduled 21 with Dr. Chung    [facility]:   Phone: [phone]  Fax # [phone]    HST: 20  Titration PS20      Edita MENDOZA CMA, SLEEP MEDICINE, 2021 10:49 AM

## 2021-09-09 NOTE — TELEPHONE ENCOUNTER
Titration sleep study report has been received/scanned into chart today. I have sent another request for HST report from 9/4/20.     Edita MENDOZA CMA, SLEEP MEDICINE, 9/9/2021 3:05 PM

## 2021-09-13 ASSESSMENT — ENCOUNTER SYMPTOMS
DECREASED CONCENTRATION: 1
CHILLS: 0
FATIGUE: 1
HEMATURIA: 0
STIFFNESS: 1
BLOATING: 1
SYNCOPE: 0
TINGLING: 1
DYSURIA: 0
SORE THROAT: 0
VOMITING: 0
SINUS CONGESTION: 1
ORTHOPNEA: 0
DIZZINESS: 1
DISTURBANCES IN COORDINATION: 1
PALPITATIONS: 1
INCREASED ENERGY: 1
SEIZURES: 0
RECTAL PAIN: 0
HYPOTENSION: 0
ARTHRALGIAS: 1
TROUBLE SWALLOWING: 0
ALTERED TEMPERATURE REGULATION: 1
POLYDIPSIA: 0
HYPERTENSION: 0
DEPRESSION: 1
BACK PAIN: 0
PANIC: 0
JAUNDICE: 0
WEAKNESS: 0
MEMORY LOSS: 0
TREMORS: 0
ABDOMINAL PAIN: 0
MUSCLE CRAMPS: 0
HALLUCINATIONS: 0
PARALYSIS: 0
TASTE DISTURBANCE: 0
DIFFICULTY URINATING: 1
NERVOUS/ANXIOUS: 1
LOSS OF CONSCIOUSNESS: 0
FEVER: 0
INSOMNIA: 1
HOARSE VOICE: 0
FLANK PAIN: 0
NAUSEA: 1
DECREASED APPETITE: 0
HEADACHES: 0
SMELL DISTURBANCE: 1
LIGHT-HEADEDNESS: 1
WEIGHT GAIN: 0
NIGHT SWEATS: 1
BOWEL INCONTINENCE: 1
LEG PAIN: 1
CONSTIPATION: 1
MYALGIAS: 0
EYE IRRITATION: 1
EYE PAIN: 1
BLOOD IN STOOL: 0
NUMBNESS: 1
EYE WATERING: 0
WEIGHT LOSS: 0
SINUS PAIN: 0
POLYPHAGIA: 1
NECK MASS: 0
MUSCLE WEAKNESS: 0
SPEECH CHANGE: 0
NECK PAIN: 1
EXERCISE INTOLERANCE: 1
DIARRHEA: 0
DOUBLE VISION: 0
HEARTBURN: 1
EYE REDNESS: 1
SLEEP DISTURBANCES DUE TO BREATHING: 0

## 2021-09-13 ASSESSMENT — SLEEP AND FATIGUE QUESTIONNAIRES
HOW LIKELY ARE YOU TO NOD OFF OR FALL ASLEEP WHILE LYING DOWN TO REST IN THE AFTERNOON WHEN CIRCUMSTANCES PERMIT: SLIGHT CHANCE OF DOZING
HOW LIKELY ARE YOU TO NOD OFF OR FALL ASLEEP WHILE SITTING AND READING: SLIGHT CHANCE OF DOZING
HOW LIKELY ARE YOU TO NOD OFF OR FALL ASLEEP WHILE SITTING AND TALKING TO SOMEONE: WOULD NEVER DOZE
HOW LIKELY ARE YOU TO NOD OFF OR FALL ASLEEP IN A CAR, WHILE STOPPED FOR A FEW MINUTES IN TRAFFIC: WOULD NEVER DOZE
HOW LIKELY ARE YOU TO NOD OFF OR FALL ASLEEP WHILE SITTING QUIETLY AFTER LUNCH WITHOUT ALCOHOL: WOULD NEVER DOZE
HOW LIKELY ARE YOU TO NOD OFF OR FALL ASLEEP WHILE SITTING INACTIVE IN A PUBLIC PLACE: WOULD NEVER DOZE
HOW LIKELY ARE YOU TO NOD OFF OR FALL ASLEEP WHILE WATCHING TV: WOULD NEVER DOZE
HOW LIKELY ARE YOU TO NOD OFF OR FALL ASLEEP WHEN YOU ARE A PASSENGER IN A CAR FOR AN HOUR WITHOUT A BREAK: WOULD NEVER DOZE

## 2021-09-16 ENCOUNTER — VIRTUAL VISIT (OUTPATIENT)
Dept: SLEEP MEDICINE | Facility: CLINIC | Age: 51
End: 2021-09-16
Attending: STUDENT IN AN ORGANIZED HEALTH CARE EDUCATION/TRAINING PROGRAM
Payer: COMMERCIAL

## 2021-09-16 ENCOUNTER — CARE COORDINATION (OUTPATIENT)
Dept: SLEEP MEDICINE | Facility: CLINIC | Age: 51
End: 2021-09-16

## 2021-09-16 VITALS — WEIGHT: 145 LBS | BODY MASS INDEX: 21.98 KG/M2 | HEIGHT: 68 IN

## 2021-09-16 DIAGNOSIS — G47.33 OSA (OBSTRUCTIVE SLEEP APNEA): ICD-10-CM

## 2021-09-16 DIAGNOSIS — G47.00 INSOMNIA, UNSPECIFIED TYPE: Primary | ICD-10-CM

## 2021-09-16 PROCEDURE — 99205 OFFICE O/P NEW HI 60 MIN: CPT | Mod: GT | Performed by: INTERNAL MEDICINE

## 2021-09-16 ASSESSMENT — MIFFLIN-ST. JEOR: SCORE: 1325.19

## 2021-09-16 NOTE — PATIENT INSTRUCTIONS
Our Fax ar Sleep [place] [phone] Please get the home sleep test interpretation and report from [place] 9/4/2020        Insomnia and Behavioral Sleep Medicine Program    [facility] Insomnia and Behavioral Sleep Medicine Program provides non-drug treatment for sleep problems including:      Cognitive-behavioral Therapies for Insomnia (CBT-I)    Management of Shift-work and Jet Lag    Management of Delayed, Advanced and Irregular Circadian Rhythm Sleep Disorders    Imagery Rehearsal Therapy (IRT) for Nightmare Disorder    PAP Therapy Desensitization    You have been referred for consultation with a sleep psychologist who specializes in behavioral sleep medicine and treatment of insomnia.  The [facility] Insomnia and Behavioral Sleep Medicine Program offers individualized telehealth services through our [facility] and online CBT-I.    Preparing for your Consultation:    You will need to keep a Sleep Diary for at least a week prior to your visit.  Simply download he CBTi- timothy on your mobile devise and enter your sleep estimates in the My Sleep section each day.  You can also use the paper sleep diary provided. Your estimates should be your recollection of your last night's sleep.  We recommend you DO watch the clock or gather data during the night.      OR        Make sure to have your CBTI  Timothy or paper sleep diary data available to review during consultation with your sleep provider.  You can also e-mail your sleep diary information to [email or web address] or fax it to [phone].    Frequently Asked Questions    What is CBT-I?    Cognitive Behavioral Therapy for Insomnia, also known as CBT-I, is a highly effective non-drug treatment for insomnia. The American College of Physicians recommends CBT-I as the first treatment for chronic insomnia.  Research has shown CBT-I to be safer and more effective long term than sleeping pills.    What does CBT-I involve?      CBT-I targets behaviors that lead to chronic insomnia:    Habits that weaken the bed as a cue for sleep    Habits that weaken your body's sleep drive and sleep/wake clock     Unhelpful sleep thoughts that increase sleep-related worry and arousal.    The process works like a 4-6 session training program that provides you with the information and coaching needed to implement proven strategies to get a better night's sleep.    People often see improvement in their sleep within a few weeks. Research shows if you keep practicing the skills you learn your sleep is likely to continue to improve 6-12 months after treatment.    Does this program prescribe or manage sleep medication?    No.  Your prescribing provider is responsible to assist you in managing your sleep medications.  Some people choose to stop using sleep medication prior to or during CBT-I.  Our program can work with your prescribing provider to help reduce or eliminate use of sleep medications.     Can I Get Started Today?    Yes, you Can!  With our Online CBT-I program.     Research indicates that online CBT-I can be as effective as in-person therapy for motivated patients. It requires comfort with online learning and confidence that making changes in sleep habits can improve your sleep. Different from other online CBT-I programs, our insomnia program incorporates virtual visit follow-up. Our online CBT-I program is formatted for use on computers and mobile devices.  The cost for an entire 6 week program is $40.           Online CBT-I is not for everyone.  Contraindications include individuals with seizure disorders, bipolar disorder, unstable medical or mental health conditions, risk of falling, or are pregnant.    If you opt to try online CBT-I, we ask that you sign a consent for the [facility] to share your online CBT-I information with our sleep program.  To optimize care coordination, we ask that you also agree to share data from your daily sleep  diaries. This can be done by entering the sharing code Memoright.    To get started, copy and past the link below which will take you to the landing page to register:            [email or web address]                                 Are there any recommended self-help workbooks?    Yes!  We recommend you consider:    Say Dulce to Insomnia:The Six-Week, Drug-Free Program Developed At [facility].  Jason Clinton MD. Available in paperback, Tracy and audiobook.        Overcoming Insomnia: A Cognitive-Behavioral Therapy Approach, Workbook.  Victor Manuel Goss, PhD  and Magnolia Bryant, PhD.  Available in paperback and Tracy.        Quiet Your Mind and Get to Sleep: Solutions to Insomnia for Those with Depression, Anxiety, or Chronic Pain.  Katelyn Lawton, PhD and Magnolia Bryant, PhD.  Available in paperback and Tracy                [facility] Sleep Medicine Dentists  Search engine: https://mms.aadsm.org/members/directory/search_bootstrap.php?org_id=ADSM&   Certified in Dental Sleep Medicine    Son Tovar  Degree: DDS  [address]  [address]  [place] [address]  Professional Phone: [phone]  Website: [email or web address].[email or web address]    Canelo Vaughan  Degree: DDS  [facility]  [address]  [address]  [place] [address]  Professional Phone: [phone]: [phone]    Viji Candelario  [facility]  [address]  [place] [address]  Professional Phone: [phone]  Website: [email or web address]      Melvin Reese  Degree: DDS  [address]  [place] [address]  Professional Phone: [phone]  Fax: [phone]    Jose Hudson  Degree: DDS  [facility]  [address]  [place] [address]  Professional Phone: [phone]  Website: [email or web address]      Stacy Chung  [facility]  [address]  [address]  [place] [address]  Professional Phone: [phone]  Website: [email or web address]      Harper Magaña  Degree: DDS  [facility]  [address]  [place] [address]  Professional Phone: [phone]     Karen Parker  Degree: DDS, PhD  [facility]  [address] [place] [address] [address] [place] [address]   [address]   [address]   [place] [address]   Appointments: [phone]   Fax: [phone]   [facility] [place]   [address]   [place] [address]   Appointments: [phone]   Fax: [phone]                Devon Guillen  Degree: DDS  [address] [place] [address]  Professional Phone: [phone]  Fax: [phone]  Website: [email or web address]      Rajat Gaviria  Degree: DDS  [facility]  [address] [place] [address]    Mariona Mulet Pradera  Degree: KAROLINAS, MS  [facility]  [address] [place] [address]  Professional Phone: [phone]      Ximena Fairchild  Degree: MS ANA  [facility]  [address]  [place] [address]  Professional Phone: [phone]    Aubrie Dudley  Degree: DDS  [facility]  [address]  [address]  [place] [address]  [place] Office     Lm Dubois  Degree: DDS  [facility]  [address] [place] [address]  Professional Phone: [phone]  Website: [email or web address]      Vinicio Wynne  Degree: DDS  [facility]  [address] [place] [address]  Professional Phone: [phone]  Fax: [phone]      Celso Martin  Degree: ANA  [facility]  [address]  [address]  [place] [address]                 ACCEPT MEDICARE  Abdelrahman Powell, ANA  [address], [place] [address]  [phone]; [phone] (fax)  [facility] [place]    Antelmo Cary DDS, MS   [facility]   [address]   [address]   [place] [address]   Appointments: [phone]   Fax: [phone]     Walt Martínez DDS  [address]  [place] [address]  Appointments [phone]  Fax: [phone]    ADDITIONAL PROVIDERS  Main Amin DDS   [facility]   [address]   [address]   [place] [address]   Appointments: [phone]   Fax: [phone]       Donnell Scanlon DDS, MS   [facility]  [address] [place] [address] [address]  [place] [address]   Appointments: [phone] Ext: [phone]  Fax: [phone]   [email or web address]      For MICHAEL: BESIDES CPAP, WHAT OTHER THERAPIES ARE THERE?    Positioning Device  Positioning devices are generally used when sleep apnea is mild and only occurs on your back.This example shows a pillow that straps around the waist. It may be appropriate for those whose sleep study shows milder sleep apnea that occurs primarily when lying flat on one's back. Preliminary studies have shown benefit but effectiveness at home may need to be verified by a home sleep test. These devices are generally not covered by medical insurance.  Examples of devices that maintain sleeping on the back to prevent snoring and mild sleep apnea.    Belt type body positioner  http://Sparkbuyosa.com/    Electronic reminder  http://nightshifttherapy.com/  http://www.buzzpod.com.au/      Oral Appliance  What is oral appliance therapy?  An oral appliance device fits on your teeth at night like a retainer used after having braces. The device is made by a specialized dentist and requires several visits over 1-2 months before a manufactured device is made to fit your teeth and is adjusted to prevent your sleep apnea. Once an oral device is working properly, snoring should be improved. A home sleep test  may be recommended at that time if to determine whether the sleep apnea is adequately treated.       Some things to remember:  -Oral devices are often, but not always, covered by your medical insurance. Be sure to check with your insurance provider.   -If you are referred for oral therapy, you will be given a list of specialized dentists to consider or you may choose to visit the Web site of the American Academy of Dental Sleep Medicine  -Oral devices are less likely to work if you have severe sleep apnea or are extremely overweight.     More detailed information  An oral appliance is a small acrylic device that fits over the upper and lower teeth  (similar to a retainer or a mouth guard). This device slightly moves jaw forward, which moves the base of the tongue forward, opens the airway, improves breathing for effective treat snoring and obstructive sleep apnea in perhaps 7 out of 10 people .  The best working devices are custom-made by a dental device  after a mold is made of the teeth 1, 2, 3.  When is an oral appliance indicated?  Oral appliance therapy is recommended as a first-line treatment for patients with primary snoring, mild sleep apnea, and for patients with moderate sleep apnea who prefer appliance therapy to use of CPAP4, 5. Severity of sleep apnea is determined by sleep testing and is based on the number of respiratory events per hour of sleep.   How successful is oral appliance therapy?  The success rate of oral appliance therapy in patients with mild sleep apnea is 75-80% while in patients with moderate sleep apnea it is 50-70%. The chance of success in patients with severe sleep apnea is 40-50%. The research also shows that oral appliances have a beneficial effect on the cardiovascular health of MICHAEL patients at the same magnitude as CPAP therapy7.  Oral appliances should be a second-line treatment in cases of severe sleep apnea, but if not completely successful then a combination  therapy utilizing CPAP plus oral appliance therapy may be effective. Oral appliances tend to be effective in a broad range of patients although studies show that the patients who have the highest success are females, younger patients, those with milder disease, and less severe obesity. 3, 6.   Finding a dentist that practices dental sleep medicine  Specific training is available through the American Academy of Dental Sleep Medicine for dentists interested in working in the field of sleep. To find a dentist who is educated in the field of sleep and the use of oral appliances, near you, visit the Web site of the American Academy of Dental Sleep Medicine.    References  1. Cassy et al. Objectively measured vs self-reported compliance during oral appliance therapy for sleep-disordered breathing. Chest 2013; 144(5): 3113-3460.  2. Jannie et al. Objective measurement of compliance during oral appliance therapy for sleep-disordered breathing. Thorax 2013; 68(1): 91-96.  3. Sarah, et al. Mandibular advancement devices in 620 men and women with MICHAEL and snoring: tolerability and predictors of treatment success. Chest 2004; 125: 6618-6353.  4. Gosia, et al. Oral appliances for snoring and MICHAEL: a review. Sleep 2006; 29: 244-262.  5. Chevy et al. Oral appliance treatment for MICHAEL: an update. J Clin Sleep Med 2014; 10(2): 215-227.

## 2021-09-16 NOTE — PROGRESS NOTES
Carmel is a 50 year old who is being evaluated via a billable video visit.      How would you like to obtain your AVS? MyChart  If the video visit is dropped, the invitation should be resent by: Send to e-mail at: [email or web address]  Will anyone else be joining your video visit? No        Does patient have any form of state insurance? No    Do you have wifi? Yes  Do you have a smart phone? Yes  Can you download an will on your phone comfortably with out assistance? Yes  Can you watch a iSOCO video? Yes    Edita MENDOZA CMA, SLEEP MEDICINE, 9/16/2021 11:37 AM        Video-Visit Details    Type of service:  Video Visit    Video Start Time: 1:02 PM    Video End Time:1:52 PM     Originating Location (pt. Location): Home    Distant Location (provider location):  [facility] SLEEP [facility] Office    Platform used for Video Visit: PsomasFMG     Chief complaint: Poor sleep quality    History of Present Illness: 50-year-old female who describes problems with poor sleep quality for at least 6 years likely longer.  She has recently undergone a divorce and then moved to [place].  She admits to some stress related to this.  However in general she had been doing a little bit better over the last 6 months.  She was evaluated for her sleep issues back in [place] about a year and a half ago.  She had a home sleep test that showed mild sleep apnea.  She was started on CPAP but did not notice any improvement in his her sleep.  She had trouble tolerating the CPAP and was having problems with finding it comfortable.  She underwent a titration study and despite this continues to have issues.  She is not currently on any therapy.    She uses trazodone at bedtime.  Currently her typical bedtime is around 11 PM.  She usually can fall asleep in 20 minutes or so.  When she falls asleep she will wake up multiple times at night anywhere from 1-5.  She usually gets up at least once to go to the bathroom.  She has had some urinary  issues in the past and has done some physical therapy for this.  Often it will be difficult for her to return to sleep.  It can take her up to an hour to fall asleep sometimes.  In the last week she has had a couple of these higher nights where she has had significant difficulty returning to sleep.  Lately she has been setting an alarm to make sure she gets up around 7 AM.  Without the alarm she probably sleep until 8.      She is not taking any naps.  She does have some fatigue during the day and in the evening.  She denies actual drowsiness.  She does not drink any caffeinated beverages.  She is having some vivid dreams on occasion with a history of some nightmares in the past.  No dream enactment behavior.  No clear restless legs.  She has had some history of tingling sensation in her legs in the past which was treated with gabapentin.  But this was years ago.  She does have a history of bruxism is not using using an oral appliance at this time but is considering one.  She has a lot of sensitivity to any kind of mucus in her airway and oral dryness.  It can cause panic sensations.  She thinks she has some dryness associated with medication such as trazodone and propranolol.  She uses the propranolol for anxieties.  She has not undergone formal cognitive behavioral therapy for insomnia.    She thinks she has gained 10 pounds or less since her last sleep study.  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    [place] Sleepiness Scale   Sitting and reading: Slight chance of dozing   Watching TV: Would never doze   Sitting, inactive in a public place (e.g. a theatre or a meeting): Would never doze   As a passenger in a car for an hour without a break: Would never doze   Lying down to rest in the afternoon when circumstances permit: Slight chance of dozing   Sitting and talking to someone: Would never doze   Sitting quietly after a lunch without alcohol: Would never doze   In a car, while  stopped for a few minutes in traffic: Would never doze   Total score - [place]: 2   (Less than 10 normal)    Insomnia Severity Scale  MERY Total Score: 9  Total score categories:  0-7 = No clinically significant insomnia   8-14 = Subthreshold insomnia   15-21 = Clinical insomnia (moderate severity)  22-28 = Clinical insomnia (severe)      Past Medical History:   Diagnosis Date     NAPOLEON (generalized anxiety disorder)      Hypothyroid      IBS (irritable bowel syndrome)      mild MICHAEL (obstructive sleep apnea)      SVT (supraventricular tachycardia) (H)      Vasovagal reaction        Allergies   Allergen Reactions     Melon      Oral reaction      Mold      Morphine Nausea and Vomiting     Nsaids Hives     Other Environmental Allergy      Tomato Hives       Current Outpatient Medications   Medication     azelaic acid (FINACIA) 15 % external gel     hydrocortisone 2.5 % cream     influenza quadrivalent, PF, vacc (FLULAVAL QUADRIVALENT) 0.5 ML injection     L-Methylfolate (ELFOLATE) 15 MG TABS     MAGNESIUM GLYCINATE PO     progesterone (PROMETRIUM) 100 MG capsule     propranolol (INDERAL) 10 MG tablet     SYNTHROID 112 MCG tablet     traZODone (DESYREL) 50 MG tablet     UNABLE TO FIND     UNABLE TO FIND     UNABLE TO FIND     vitamin D3 (VITAMIN D3) 50 mcg (2000 units) tablet     No current facility-administered medications for this visit.       Social History     Socioeconomic History     Marital status:      Spouse name: Not on file     Number of children: Not on file     Years of education: Not on file     Highest education level: Not on file   Occupational History     Occupation: homemaker     Occupation: student:    Tobacco Use     Smoking status: Never Smoker     Smokeless tobacco: Never Used   Substance and Sexual Activity     Alcohol use: Not Currently     Drug use: Not Currently     Sexual activity: Not Currently   Other Topics Concern     Parent/sibling w/ CABG, MI or angioplasty before 65F  "55M? Not Asked   Social History Narrative     Not on file     Social Determinants of Health     Financial Resource Strain:      Difficulty of Paying Living Expenses:    Food Insecurity:      Worried About Running Out of Food in the Last Year:      Ran Out of Food in the Last Year:    Transportation Needs:      Lack of Transportation (Medical):      Lack of Transportation (Non-Medical):    Physical Activity:      Days of Exercise per Week:      Minutes of Exercise per Session:    Stress:      Feeling of Stress :    Social Connections:      Frequency of Communication with Friends and Family:      Frequency of Social Gatherings with Friends and Family:      Attends Yazdanism Services:      Active Member of Clubs or Organizations:      Attends Club or Organization Meetings:      Marital Status:    Intimate Partner Violence:      Fear of Current or Ex-Partner:      Emotionally Abused:      Physically Abused:      Sexually Abused:        Family History   Problem Relation Age of Onset     Atrial fibrillation Father      Cerebrovascular Disease Father      Dementia Father      Ovarian Cancer Mother      Hypertension Mother      Sleep Apnea Brother      Myocardial Infarction Maternal Grandmother      Cancer Paternal Grandfather      Colon Cancer No family hx of      Breast Cancer No family hx of        Review of Systems: Positve for \"Stress sweats\", decreased focus, deviated septum, anxiety, history of eating disorder associated with concerns about ingesting mold, waking up hungry, occasional panic, and per HPI, otherwise comprehensive review of systems is negative.    EXAM:  Ht 1.734 m (5' 8.25\")   Wt 65.8 kg (145 lb)   BMI 21.89 kg/m    GENERAL: Healthy, alert and no distress  EYES: Eyes grossly normal to inspection.  No discharge or erythema, or obvious scleral/conjunctival abnormalities.  RESP: No audible wheeze, cough, or visible cyanosis.  No visible retractions or increased work of breathing.    SKIN: Visible skin " clear. No significant rash, abnormal pigmentation or lesions.  NEURO: Cranial nerves grossly intact.  Mentation and speech appropriate for age.  PSYCH: Mentation appears normal, affect normal, judgement and insight intact, normal speech and appearance well-groomed.       PSG Titration 12/11/2020 [facility]  Weight 143 lbs, BMI 21.4  CPAP titrated to 6    Home sleep apnea testing 9/4/2020  AHI 8.3, lowest oxygen saturation 85%        ASSESSMENT:  50 year-old female with history of anxiety, insomnia likely psychophysiologic, overall mild obstructive sleep apnea.  It is possible that the mild sleep apnea is not a major cause of symptoms.  Likely she would benefit for cognitive behavioral therapy for insomnia, she is a good candidate.  She is motivated.    PLAN:  We will try to obtain records from her home sleep apnea testing.  We reviewed the indications for treatment of mild sleep apnea.  If patient would like another treatment trial can consider oral appliance therapy.  In the meantime strongly recommended working with insomnia specialist.  Referral generated for CBT-I.      70 minutes spent on the date of the encounter doing chart review, history and exam, documentation and further activities per the note    Joan Chung M.D.  Pulmonary/Critical Care/Sleep Medicine    [facility]   Floor 1, [address]   [address] [place] [address]. [place] [address]   Appointments: [phone]    The above note was dictated using voice recognition software and may include typographical errors. Please contact the author for any clarifications.

## 2021-09-16 NOTE — PROGRESS NOTES
Scan of HST/Interpretation [facility], 9/4/2020.  Routed for review to Dr. REZA Chung.

## 2021-09-20 NOTE — PROGRESS NOTES
Sleep psychology referral has been mailed to patient today.    Edita MENDOZA CMA, SLEEP MEDICINE, 9/20/2021 7:28 AM

## 2021-09-24 ENCOUNTER — TELEPHONE (OUTPATIENT)
Dept: PHYSICAL THERAPY | Facility: REHABILITATION | Age: 51
End: 2021-09-24

## 2021-10-11 ASSESSMENT — PATIENT HEALTH QUESTIONNAIRE - PHQ9
SUM OF ALL RESPONSES TO PHQ QUESTIONS 1-9: 9
10. IF YOU CHECKED OFF ANY PROBLEMS, HOW DIFFICULT HAVE THESE PROBLEMS MADE IT FOR YOU TO DO YOUR WORK, TAKE CARE OF THINGS AT HOME, OR GET ALONG WITH OTHER PEOPLE: SOMEWHAT DIFFICULT
SUM OF ALL RESPONSES TO PHQ QUESTIONS 1-9: 9

## 2021-10-13 ENCOUNTER — OFFICE VISIT (OUTPATIENT)
Dept: FAMILY MEDICINE | Facility: CLINIC | Age: 51
End: 2021-10-13
Payer: COMMERCIAL

## 2021-10-13 VITALS
HEART RATE: 74 BPM | SYSTOLIC BLOOD PRESSURE: 94 MMHG | BODY MASS INDEX: 21.35 KG/M2 | DIASTOLIC BLOOD PRESSURE: 60 MMHG | HEIGHT: 68 IN | WEIGHT: 140.9 LBS | OXYGEN SATURATION: 99 %

## 2021-10-13 DIAGNOSIS — E03.9 ACQUIRED HYPOTHYROIDISM: Primary | ICD-10-CM

## 2021-10-13 DIAGNOSIS — Z11.4 SCREENING FOR HIV (HUMAN IMMUNODEFICIENCY VIRUS): ICD-10-CM

## 2021-10-13 DIAGNOSIS — Z13.220 SCREENING FOR LIPID DISORDERS: ICD-10-CM

## 2021-10-13 DIAGNOSIS — Z11.59 NEED FOR HEPATITIS C SCREENING TEST: ICD-10-CM

## 2021-10-13 DIAGNOSIS — Z00.00 ROUTINE GENERAL MEDICAL EXAMINATION AT A HEALTH CARE FACILITY: ICD-10-CM

## 2021-10-13 LAB
ALBUMIN SERPL-MCNC: 4.1 G/DL (ref 3.5–5)
ALP SERPL-CCNC: 70 U/L (ref 45–120)
ALT SERPL W P-5'-P-CCNC: 16 U/L (ref 0–45)
ANION GAP SERPL CALCULATED.3IONS-SCNC: 7 MMOL/L (ref 5–18)
AST SERPL W P-5'-P-CCNC: 20 U/L (ref 0–40)
BILIRUB SERPL-MCNC: 0.6 MG/DL (ref 0–1)
BUN SERPL-MCNC: 13 MG/DL (ref 8–22)
CALCIUM SERPL-MCNC: 9.8 MG/DL (ref 8.5–10.5)
CHLORIDE BLD-SCNC: 106 MMOL/L (ref 98–107)
CHOLEST SERPL-MCNC: 199 MG/DL
CO2 SERPL-SCNC: 27 MMOL/L (ref 22–31)
CREAT SERPL-MCNC: 0.78 MG/DL (ref 0.6–1.1)
ERYTHROCYTE [DISTWIDTH] IN BLOOD BY AUTOMATED COUNT: 11.7 % (ref 10–15)
FASTING STATUS PATIENT QL REPORTED: YES
GFR SERPL CREATININE-BSD FRML MDRD: 88 ML/MIN/1.73M2
GLUCOSE BLD-MCNC: 91 MG/DL (ref 70–125)
HCT VFR BLD AUTO: 43 % (ref 35–47)
HDLC SERPL-MCNC: 57 MG/DL
HGB BLD-MCNC: 14.1 G/DL (ref 11.7–15.7)
HIV 1+2 AB+HIV1 P24 AG SERPL QL IA: NEGATIVE
LDLC SERPL CALC-MCNC: 121 MG/DL
MCH RBC QN AUTO: 31.3 PG (ref 26.5–33)
MCHC RBC AUTO-ENTMCNC: 32.8 G/DL (ref 31.5–36.5)
MCV RBC AUTO: 96 FL (ref 78–100)
PLATELET # BLD AUTO: 187 10E3/UL (ref 150–450)
POTASSIUM BLD-SCNC: 4.1 MMOL/L (ref 3.5–5)
PROT SERPL-MCNC: 7.7 G/DL (ref 6–8)
RBC # BLD AUTO: 4.5 10E6/UL (ref 3.8–5.2)
SODIUM SERPL-SCNC: 140 MMOL/L (ref 136–145)
TRIGL SERPL-MCNC: 107 MG/DL
TSH SERPL DL<=0.005 MIU/L-ACNC: 1.31 UIU/ML (ref 0.3–5)
WBC # BLD AUTO: 4.8 10E3/UL (ref 4–11)

## 2021-10-13 PROCEDURE — 36415 COLL VENOUS BLD VENIPUNCTURE: CPT | Performed by: STUDENT IN AN ORGANIZED HEALTH CARE EDUCATION/TRAINING PROGRAM

## 2021-10-13 PROCEDURE — 90750 HZV VACC RECOMBINANT IM: CPT | Performed by: STUDENT IN AN ORGANIZED HEALTH CARE EDUCATION/TRAINING PROGRAM

## 2021-10-13 PROCEDURE — 80053 COMPREHEN METABOLIC PANEL: CPT | Performed by: STUDENT IN AN ORGANIZED HEALTH CARE EDUCATION/TRAINING PROGRAM

## 2021-10-13 PROCEDURE — 86803 HEPATITIS C AB TEST: CPT | Performed by: STUDENT IN AN ORGANIZED HEALTH CARE EDUCATION/TRAINING PROGRAM

## 2021-10-13 PROCEDURE — 99000 SPECIMEN HANDLING OFFICE-LAB: CPT | Performed by: STUDENT IN AN ORGANIZED HEALTH CARE EDUCATION/TRAINING PROGRAM

## 2021-10-13 PROCEDURE — 87389 HIV-1 AG W/HIV-1&-2 AB AG IA: CPT | Performed by: STUDENT IN AN ORGANIZED HEALTH CARE EDUCATION/TRAINING PROGRAM

## 2021-10-13 PROCEDURE — 90471 IMMUNIZATION ADMIN: CPT | Performed by: STUDENT IN AN ORGANIZED HEALTH CARE EDUCATION/TRAINING PROGRAM

## 2021-10-13 PROCEDURE — 84630 ASSAY OF ZINC: CPT | Mod: 90 | Performed by: STUDENT IN AN ORGANIZED HEALTH CARE EDUCATION/TRAINING PROGRAM

## 2021-10-13 PROCEDURE — 99396 PREV VISIT EST AGE 40-64: CPT | Mod: 25 | Performed by: STUDENT IN AN ORGANIZED HEALTH CARE EDUCATION/TRAINING PROGRAM

## 2021-10-13 PROCEDURE — 90472 IMMUNIZATION ADMIN EACH ADD: CPT | Performed by: STUDENT IN AN ORGANIZED HEALTH CARE EDUCATION/TRAINING PROGRAM

## 2021-10-13 PROCEDURE — 82627 DEHYDROEPIANDROSTERONE: CPT | Performed by: STUDENT IN AN ORGANIZED HEALTH CARE EDUCATION/TRAINING PROGRAM

## 2021-10-13 PROCEDURE — 85027 COMPLETE CBC AUTOMATED: CPT | Performed by: STUDENT IN AN ORGANIZED HEALTH CARE EDUCATION/TRAINING PROGRAM

## 2021-10-13 PROCEDURE — 80061 LIPID PANEL: CPT | Performed by: STUDENT IN AN ORGANIZED HEALTH CARE EDUCATION/TRAINING PROGRAM

## 2021-10-13 PROCEDURE — 90682 RIV4 VACC RECOMBINANT DNA IM: CPT | Performed by: STUDENT IN AN ORGANIZED HEALTH CARE EDUCATION/TRAINING PROGRAM

## 2021-10-13 PROCEDURE — 82525 ASSAY OF COPPER: CPT | Mod: 90 | Performed by: STUDENT IN AN ORGANIZED HEALTH CARE EDUCATION/TRAINING PROGRAM

## 2021-10-13 PROCEDURE — 84443 ASSAY THYROID STIM HORMONE: CPT | Performed by: STUDENT IN AN ORGANIZED HEALTH CARE EDUCATION/TRAINING PROGRAM

## 2021-10-13 ASSESSMENT — MIFFLIN-ST. JEOR: SCORE: 1302.62

## 2021-10-13 NOTE — PROGRESS NOTES
t   SUBJECTIVE:   CC: Carmel Aguilar is an 50 year old woman who presents for preventive health visit.     Patient has been advised of split billing requirements and indicates understanding: Yes  Healthy Habits:     Getting at least 3 servings of Calcium per day:  Yes    Bi-annual eye exam:  Yes    Dental care twice a year:  Yes    Sleep apnea or symptoms of sleep apnea:  Sleep apnea    Diet:  Regular (no restrictions)    Frequency of exercise:  1 day/week    Duration of exercise:  Less than 15 minutes    Taking medications regularly:  Yes    Barriers to taking medications:  None    Medication side effects:  Significant flushing and Other    PHQ-2 Total Score: 2    Additional concerns today:  Yes    Sees sleep medicine for work-up of sleep apnea    Walking for exercise, encouraged 30 minutes 5 times weekly or 10,000 steps daily    Today's PHQ-2 Score:   PHQ-2 ( 1999 [employer]) 10/13/2021   Q1: Little interest or pleasure in doing things 0   Q2: Feeling down, depressed or hopeless 2   PHQ-2 Score 2   Q1: Little interest or pleasure in doing things -   Q2: Feeling down, depressed or hopeless -   PHQ-2 Score -       Abuse: Current or Past (Physical, Sexual or Emotional) - No  Do you feel safe in your environment? Yes    Have you ever done Advance Care Planning? (For example, a Health Directive, POLST, or a discussion with a medical provider or your loved ones about your wishes): No, advance care planning information given to patient to review.  Advanced care planning was discussed at today's visit.    Social History     Tobacco Use     Smoking status: Never Smoker     Smokeless tobacco: Never Used   Substance Use Topics     Alcohol use: Not Currently       Alcohol Use 10/11/2021   Prescreen: >3 drinks/day or >7 drinks/week? Not Applicable       Reviewed orders with patient.  Reviewed health maintenance and updated orders accordingly - Yes  Lab work is in process    Breast Cancer Screening:  Any new diagnosis of family  "breast, ovarian, or bowel cancer? No patient's mother had ovarian cancer at 48 and this documented.  No other family history.  Screening currently recommended.    FHS-7: No flowsheet data found.    Mammogram Screening: Recommended annual mammography  -Patient notes she completed her mammogram just a few months ago.  Will work on getting reads for us    History of abnormal Pap smear: NO - age 30-65 PAP every 5 years with negative HPV co-testing recommended  -Patient notes she recently completed this last year    Patient's last colonoscopy was in 2019.  Patient gets follow-ups every 5 years     Reviewed and updated as needed this visit by clinical staff  Tobacco  Allergies  Meds  Problems  Med Hx  Surg Hx  Fam Hx          Reviewed and updated as needed this visit by Provider  Tobacco  Allergies  Meds  Problems  Med Hx  Surg Hx  Fam Hx             Review of Systems  CONSTITUTIONAL: NEGATIVE for fever, chills, change in weight  INTEGUMENTARY/SKIN: NEGATIVE for worrisome rashes, moles or lesions  EYES: NEGATIVE for vision changes or irritation  ENT: NEGATIVE for ear, mouth and throat problems  RESP: NEGATIVE for significant cough or SOB  BREAST: NEGATIVE for masses, tenderness or discharge  CV: NEGATIVE for chest pain, palpitations or peripheral edema  GI: NEGATIVE for nausea, abdominal pain, heartburn, or change in bowel habits  : NEGATIVE for unusual urinary or vaginal symptoms. No vaginal bleeding.  MUSCULOSKELETAL: NEGATIVE for significant arthralgias or myalgia  NEURO: NEGATIVE for weakness, dizziness or paresthesias  PSYCHIATRIC: NEGATIVE for changes in mood or affect      OBJECTIVE:   BP 94/60 (BP Location: Left arm, Patient Position: Sitting, Cuff Size: Adult Regular)   Pulse 74   Ht 1.727 m (5' 8\")   Wt 63.9 kg (140 lb 14.4 oz)   SpO2 99%   BMI 21.42 kg/m    Physical Exam  GENERAL APPEARANCE: healthy, alert and no distress  EYES: Eyes grossly normal to inspection, PERRL and conjunctivae and " sclerae normal  HENT: ear canals and TM's normal, nose and mouth without ulcers or lesions, oropharynx clear and oral mucous membranes moist  NECK: no adenopathy, no asymmetry, masses, or scars and thyroid normal to palpation  RESP: lungs clear to auscultation - no rales, rhonchi or wheezes  CV: regular rate and rhythm, normal S1 S2, no S3 or S4, no murmur, click or rub, no peripheral edema and peripheral pulses strong  ABDOMEN: soft, nontender, no hepatosplenomegaly, no masses and bowel sounds normal  MS: no musculoskeletal defects are noted and gait is age appropriate without ataxia  SKIN: no suspicious lesions or rashes  NEURO: Normal strength and tone, sensory exam grossly normal, mentation intact and speech normal  PSYCH: mentation appears normal and affect normal/bright    Diagnostic Test Results:  Labs reviewed in Epic    ASSESSMENT/PLAN:   Carmel was seen today for physical.    Diagnoses and all orders for this visit:    Acquired hypothyroidism: Patient needing TSH for hypothyroidism management  -     TSH with free T4 reflex; Future  -     TSH with free T4 reflex    Screening for HIV (human immunodeficiency virus)  -     HIV Antigen Antibody Combo; Future  -     HIV Antigen Antibody Combo    Need for hepatitis C screening test  -     Hepatitis C antibody; Future  -     Hepatitis C antibody    Routine general medical examination at a health care facility: Patient takes multiple supplements.  Because of this her prior primary care provider monitored her zinc and copper levels.  In addition she has been known to have a high DHEA-sulfate.  She wants to ensure that this is coming down.  I am okay with checking these today.  We will also check a CBC and metabolic panel given her use of supplemental therapy  -     DHEA sulfate; Future  -     Zinc; Future  -     Copper level; Future  -     CBC with platelets; Future  -     Comprehensive metabolic panel (BMP + Alb, Alk Phos, ALT, AST, Total. Bili, TP); Future  -      "DHEA sulfate  -     Zinc  -     Copper level  -     CBC with platelets  -     Comprehensive metabolic panel (BMP + Alb, Alk Phos, ALT, AST, Total. Bili, TP)    Screening for lipid disorders: No prior lipid test on file.  We will get this to calculate her ASCVD risk  -     Lipid panel reflex to direct LDL Fasting; Future  -     Lipid panel reflex to direct LDL Fasting    Other orders  -     REVIEW OF HEALTH MAINTENANCE PROTOCOL ORDERS  -     INFLUENZA QUAD, RECOMBINANT, P-FREE (RIV4) (FLUBLOK)  -     ZOSTER VACCINE RECOMBINANT ADJUVANTED IM NJX    Patient utilizes replacement hormone therapy.  Advised that I am not generally comfortable prescribing these.  Encouraged her to follow-up with OB/GYN or the [facility] [place].  Patient will look into whether or not the menopause center is covered if not I will place a referral for Dr. Walls.    Patient has been advised of split billing requirements and indicates understanding: No  COUNSELING:  Reviewed preventive health counseling, as reflected in patient instructions       Regular exercise       Healthy diet/nutrition       Folic Acid       Colon cancer screening       ([place])menopause management    Estimated body mass index is 21.42 kg/m  as calculated from the following:    Height as of this encounter: 1.727 m (5' 8\").    Weight as of this encounter: 63.9 kg (140 lb 14.4 oz).    She reports that she has never smoked. She has never used smokeless tobacco.      Counseling Resources:  ATP IV Guidelines  Pooled Cohorts Equation Calculator  Breast Cancer Risk Calculator  BRCA-Related Cancer Risk Assessment: FHS-7 Tool  FRAX Risk Assessment  ICSI Preventive Guidelines  Dietary Guidelines for Americans, 2010  USDA's MyPlate  ASA Prophylaxis  Lung CA Screening    Dayaan Calle, [facility]  Answers for HPI/ROS submitted by the patient on 10/11/2021  If you checked off any problems, how difficult have these problems made it for " you to do your work, take care of things at home, or get along with other people?: Somewhat difficult  PHQ9 TOTAL SCORE: 9

## 2021-10-13 NOTE — PATIENT INSTRUCTIONS
[place] [facility]  -if not able to see them let me know and I will send referral to Dr. Walls.       Matt nasal saline    Preventive Health Recommendations  Female Ages 50 - 64    Yearly exam: See your health care provider every year in order to  o Review health changes.   o Discuss preventive care.    o Review your medicines if your doctor has prescribed any.      Get a Pap test every three years (unless you have an abnormal result and your provider advises testing more often).    If you get Pap tests with HPV test, you only need to test every 5 years, unless you have an abnormal result.     You do not need a Pap test if your uterus was removed (hysterectomy) and you have not had cancer.    You should be tested each year for STDs (sexually transmitted diseases) if you're at risk.     Have a mammogram every 1 to 2 years.    Have a colonoscopy at age 50, or have a yearly FIT test (stool test). These exams screen for colon cancer.      Have a cholesterol test every 5 years, or more often if advised.    Have a diabetes test (fasting glucose) every three years. If you are at risk for diabetes, you should have this test more often.     If you are at risk for osteoporosis (brittle bone disease), think about having a bone density scan (DEXA).    Shots: Get a flu shot each year. Get a tetanus shot every 10 years.    Nutrition:     Eat at least 5 servings of fruits and vegetables each day.    Eat whole-grain bread, whole-wheat pasta and brown rice instead of white grains and rice.    Get adequate Calcium and Vitamin D.     Lifestyle    Exercise at least 150 minutes a week (30 minutes a day, 5 days a week). This will help you control your weight and prevent disease.    Limit alcohol to one drink per day.    No smoking.     Wear sunscreen to prevent skin cancer.     See your dentist every six months for an exam and cleaning.    See your eye doctor every 1 to 2 years.

## 2021-10-14 LAB
DHEA-S SERPL-MCNC: 322 UG/DL (ref 35–430)
HCV AB SERPL QL IA: NEGATIVE

## 2021-10-16 LAB
COPPER SERPL-MCNC: 107.7 UG/DL
ZINC SERPL-MCNC: 94.3 UG/DL

## 2021-10-27 ENCOUNTER — HOSPITAL ENCOUNTER (OUTPATIENT)
Dept: PHYSICAL THERAPY | Facility: REHABILITATION | Age: 51
End: 2021-10-27
Payer: COMMERCIAL

## 2021-10-27 DIAGNOSIS — R29.3 POOR POSTURE: ICD-10-CM

## 2021-10-27 DIAGNOSIS — M95.8 BILATERAL WINGED SCAPULA: ICD-10-CM

## 2021-10-27 DIAGNOSIS — G95.0 SYRINGOMYELIA (H): Primary | ICD-10-CM

## 2021-10-27 PROCEDURE — 97110 THERAPEUTIC EXERCISES: CPT | Mod: GP | Performed by: PHYSICAL THERAPIST

## 2021-10-27 NOTE — PROGRESS NOTES
10/27/21 0900   Signing Clinician's Name / Credentials   Signing clinician's name / credentials Crys Mullins PT   Session Number   Session Number 3/8   Ortho Goal 1   Goal Description Patient will be independent with HEP and self management of condition in 8 weeks.    Goal Progress not met. Patient not compliant with home program.    Ortho Goal 2   Goal Description patient will demonstrate correct posture in standing/sitting without cues in 6 weeks.    Goal Progress progressing. Not consistent with it.    Ortho Goal 3   Goal Description Patient will report 50% decrease in nocturnal arm symptoms in 8 weeks.    Goal Progress not met.    Subjective Report   Subjective Report returns after about 2 months. Has not been working on her home program. Has been waking with UE numbness/tingling.  Her bed is not comfortable.    Objective Measure 1   Details sway back posture noted with decreased use of abdominals and upper thorax postural musculature. Mod scap winging with scap stabilization with hands at shoulder height. Needs cues for neutral posture.    Therapeutic Procedure/exercise   Therapeutic Procedures: strength, endurance, ROM, flexibillity minutes (20008) 25   Skilled Intervention ther ex   Treatment Detail rows 10 x orange, scap stab/hands on wall 10 x, prone LT retraining rev   Patient Response needs mod cues for posture   Assessments Completed   Assessments Completed HEP/POC compliance is  poor .Patient demonstrates understanding/independence with home program. Response to Intervention is fair.Patient is appropriate to continue with skilled physical therapy intervention, as indicated by initial plan of care.   Education   Learner Patient   Readiness Acceptance   Method Booklet/handout;Demonstration   Response Demonstrates Understanding   Plan   Homework Will look into wedge/props for sleeping, work on HEP compliance. Previous HEP: sup cerv rot, scap retr, UT/levator stretchs, rows, (B) ER/band, wall clock/band,  "flex/serratus/band   Home program scap retraction 10 x 5\", prone LT retraining w/arm lift, 8-10 x 5-10\", rows 10 x orange, pec stretch/corner 5 x 10-15\" , (B) UT stretch , scap stab/hands on wall    Plan for next session Continue with initial POC. Progress with HEP, posture education/strengthening, additional assessment as needed.    Comments   Comments ref provider: Dayana Calle DO   Total Session Time   Timed Code Treatment Minutes 25   Total Treatment Time (sum of timed and untimed services) 25   AMBULATORY CLINICS ONLY-MEDICAL AND TREATMENT DIAGNOSIS   PT Diagnosis Syringomyelia, poor posture, scapular winging     "

## 2021-11-01 VITALS — WEIGHT: 145 LBS | BODY MASS INDEX: 21.98 KG/M2 | HEIGHT: 68 IN

## 2021-11-01 ASSESSMENT — MIFFLIN-ST. JEOR: SCORE: 1321.22

## 2021-11-01 NOTE — PROGRESS NOTES
Carmel Aguilar is a 50 year old who is being evaluated via a billable video visit.      How would you like to obtain your AVS? MyChart  If the video visit is dropped, the invitation should be resent by: Send to e-mail at: [email or web address]  Will anyone else be joining your video visit? No    Are you currently in the state of [place] Yes  Does patient have any form of state insurance?No   Do you have wifi? Yes  Do you have a smart phone/device?Yes  Can you download an will on your phone comfortably with out assistance including You Tube? Yes    If patient encounters technical issues they should call [phone] :[id number]}    Hiwot Lewis CMA    Video-Visit Details    Video Start Time: 10:00 AM    Type of service:  Video Visit    Video End Time:`0:50 am approx    Originating Location (pt. Location): Home    Distant Location (provider location):  [email or web address]     Platform used for Video Visit: [facility]     SLEEP MEDICINE CONSULTATION  Sleep Psychology    Name: Carmel Aguilar MRN# [id number]   Age: 50 year old YOB: 1970     Date of Consultation: Nov 2, 2021  Consultation is requested by: No referring provider defined for this encounter.  Primary care provider: Dayana Calle    Reason for Sleep Consultation     Carmel Aguilar is a 50 year old female seen today for a behavioral sleep medicine consultation because of insomnia    Assessment and Plan     Sleep Diagnoses/Recommendations:       Chronic insomnia  MICHAEL (obstructive sleep apnea)    Carmel Aguilar was seen for sleep psychology consultation and for strategies to help address history of chronic insomnia associated with  History of sleep apnea, autonomic dysfunction, generalized anxiety disorder, SVT and irritable bowel syndrome.    The primary behavioral mode of therapy will be stimulus control.  Targets for behavioral treatment include a wide variation in sleep offset.  Review of the CBT-I  data also suggests a possible mild delay  in overall sleep phase.  It is recommended that we also combine stimulus control mild degree of sleep compression with a focus on consistent wake time.  Patient is reporting 4 hours or less of sleep which suggests possible degree of sleep state misperception. Patient appears to be experiencing s degree of hyperarousa in the evening.  She does have skills and strategies to help reduce anxiety.  It was emphasized that a relaxing bedtime routine was important. We will introduce our relaxation module along with directions for stimulus control.  Patient has been diagnosed with a somatic disorder and she has had good insight into how this manifests as amplification and focus on somatic symptoms with associated anxiety.    Patient was advised to follow-up with Dr. Chung regarding treatment of sleep apnea.      Summary Counseling:      Carmel was provided information about the pathophysiology of insomnia, psychophysiological factors contributing to the onset and maintenance of insomnia and how co-occurring medical conditions and intrinsic sleep disorders can affect sleep.  Treatment options were discussed including component of cognitive-behavioral therapy for insomnia as applicable to patient specific sleep concerns and symptoms. The benefits and potential early side effects of treatment including increased daytime sleepiness were discussed.     Patient was advised to consult with their prescribing provider around use of or changes to prescription sleep medication.  Patient was counseled on the importance of avoiding driving if drowsy.    See patient instructions for initial treatment recommendations and behavioral sleep plan.    Follow-up: 6 weeks     History of Present Sleep Complaint     Carmel Aguilar is a 50 year old year old female with a relevant medical history of  Mild MICHAEL, bruxism, NAPOLEON, IBS and hypothyroidism who presents with poor sleep quality for at least 6 years likely longer.  She has recently undergone  a divorce and then moved to [place].  She admits to some stress related to this.  However in general she had been doing a little bit better over the last 6 months but sleep has worsened over the last year due to numbness, tinbling related to spinal issue along with anxiety and panic symptoms.    She reports a history of deviated septum and reports she is a mouth breather who had difficulty with adapting to full face mask.    She reports that physiologic issues related to tingeliness and numbness from shoulders down that she states can be neurological.    She reports a diagnosis of somatic disorder due to health symptoms of unknown origin.Reports history of eating disorder that is treated and stable.  She reports only symptoms of eating issues is difficulty eating when needed due to lack of hunger.     She uses trazodone at bedtime.  She was recently prescribed prazosin for her insomnia but has not started this yet.  She is not taking any naps.  She does have some fatigue during the day and in the evening.        Sleep/Wake Pattern:    Shift Work - No  Source of Sleep Estimates:  verbal self-report        She is not taking any naps.   Total Time in Bed:  9.5 hours  Estimated Total Sleep Time:   hours  Sleep Efficiency Estimate:  Greater than 25%    Naps: None    Sleep Hygiene:    Behavior affecting Sleep:  None reported    Environment:  Bedroom is quiet, comfortable and dark    Substance Use:  Caffeine: None reported      Alcohol: Rare      Nicotine: None      Recreational/Illicit Drugs: None reported    Previous Sleep Studies/Consultations:    She was seen for sleep medicine consultation with Dr. Joan Cortes in September 2021. She was evaluated for her sleep issues back in [place] about a year and a half ago.  She had a home sleep test that showed mild sleep apnea.  She was started on CPAP but did not notice any improvement in his her sleep.  She had trouble tolerating the CPAP and had problems with finding it  "comfortable.  She underwent a titration study which found CPAP therapy effective..  She is not currently on any therapy and is considering a mandibular advancement device.    Screening     EPWORTH SLEEPINESS SCALE    Sitting and reading 0   Watching TV 0   Sitting, inactive in a public place (theatre or mtg.) 0    As a passenger in a car 0   Lying down to rest in the afternoon when circumstance permit 1   Sitting and talking to someone 0   Sitting quietly after lunch without alcohol 1   In a car, while stopped for a few minutes in traffic 0   TOTAL SCORE (nl <11) 2     INSOMNIA SEVERITY INDEX     Difficulty falling asleep 2   Difficult staying asleep 2   Problems waking up to early 0   How SATISFIED/DISSATISFIED are you with your CURRENT sleep pattern? 3   How NOTICEABLE to others do you think your sleep pattern is in terms of your quality of life? 1   How WORRIED/DISTRESSED are you about your current sleep pattern? 2   To what extent do you consider your sleep problem to INTERFERE with your daily fuctioning(e.g. daytime fatigue, mood, ability to function at work/daily chores, concentration, mood,etc.) CURRENTLY? 3   INSOMNIA SEVERITY INDEX TOTAL SCORE 13   Absence of insomnia (0-7); sub-threshold insomnia (8-14); moderate insomnia (15-21); and severe insomnia (22-28)    [facility] Follow-up to PHQ 10/11/2021 10/13/2021   PHQ-9 9. Suicide Ideation past 2 weeks Several days Not at all   Thoughts of suicide or self harm in past 2 weeks No -   Thoughts of suicide or self harm in past 2 weeks No -   PHQ-9 Safety concerns? Yes -   PHQ-9 Safety concerns? Yes -        No flowsheet data found.       Vitals     Ht 1.727 m (5' 8\")   Wt 65.8 kg (145 lb)   BMI 22.05 kg/m       Medical History     Patient Active Problem List   Diagnosis     Female stress incontinence     NAPOLEON (generalized anxiety disorder)     Insomnia     Autonomic dysfunction     SVT (supraventricular tachycardia) (H)     Syncope     MICHAEL (obstructive sleep apnea) "     Hypothyroidism     Irritable bowel syndrome     Syringomyelia (H)     Hypoglycemia     Rosacea     Seasonal allergic rhinitis     Bruxism     PVC's (premature ventricular contractions)     Plantar fasciitis     PVD (posterior vitreous detachment), bilateral     Auditory processing disorder        Current Outpatient Medications   Medication Sig Dispense Refill     azelaic acid (FINACIA) 15 % external gel azelaic acid 15 % topical gel   APPLY TO AFFECTED AREA EVERY DAY       hydrocortisone 2.5 % cream hydrocortisone 2.5 % topical cream       MAGNESIUM GLYCINATE PO Take by mouth daily        progesterone (PROMETRIUM) 100 MG capsule Take 100 mg by mouth daily Takes at dinner       propranolol (INDERAL) 10 MG tablet Take 2.5-5 mg by mouth 3 times daily as needed        SYNTHROID 112 MCG tablet Take 112 mcg by mouth every evening        traZODone (DESYREL) 50 MG tablet trazodone 50 mg tablet   TAKE 1/2 TO 1 TABLET BY MOUTH AT BEDTIME       UNABLE TO FIND MEDICATION NAME: EFA-Sirt Supreme dietary supplements       UNABLE TO FIND MEDICATION NAME: Bi-estrogen (compound) BID       UNABLE TO FIND MEDICATION NAME: Zinc & Copper 15mg       vitamin D3 (VITAMIN D3) 50 mcg (2000 units) tablet Take 50 mcg by mouth daily 2000 units          Past Surgical History:   Procedure Laterality Date      SECTION       WISDOM TOOTH EXTRACTION            Allergies   Allergen Reactions     Melon      Oral reaction      Mold      Morphine Nausea and Vomiting     Nsaids Hives     Other Environmental Allergy      Tomato Hives       Mental Health History     Prior Mental Health Diagnosis: generalized anxiety disorder    Current Mental Health Treatment: psychiatric medication management, Recently prescribed prazosin for insomnia, 2021.  Patient had not tried it at time of this evaluation.    Chemical Abuse/Treatment:  None reported      Family History     Family History   Problem Relation Age of Onset     Atrial fibrillation Father       Cerebrovascular Disease Father      Dementia Father      Ovarian Cancer Mother      Hypertension Mother      Sleep Apnea Brother      Myocardial Infarction Maternal Grandmother      Cancer Paternal Grandfather      Colon Cancer No family hx of      Breast Cancer No family hx of        Family History of Sleep Disorders: None reported    Social History     Social History     Socioeconomic History     Marital status:      Spouse name: None     Number of children: None     Years of education: None     Highest education level: None   Occupational History     Occupation: homemaker     Occupation: student:    Tobacco Use     Smoking status: Never Smoker     Smokeless tobacco: Never Used   Substance and Sexual Activity     Alcohol use: Not Currently     Drug use: Not Currently     Sexual activity: Not Currently   Other Topics Concern     Parent/sibling w/ CABG, MI or angioplasty before 65F 55M? Not Asked   Social History Narrative     None     Social Determinants of Health     Financial Resource Strain:      Difficulty of Paying Living Expenses:    Food Insecurity:      Worried About Running Out of Food in the Last Year:      Ran Out of Food in the Last Year:    Transportation Needs:      Lack of Transportation (Medical):      Lack of Transportation (Non-Medical):    Physical Activity:      Days of Exercise per Week:      Minutes of Exercise per Session:    Stress:      Feeling of Stress :    Social Connections:      Frequency of Communication with Friends and Family:      Frequency of Social Gatherings with Friends and Family:      Attends [employer] Services:      Active Member of Clubs or Organizations:      Attends Club or Organization Meetings:      Marital Status:    Intimate Partner Violence:      Fear of Current or Ex-Partner:      Emotionally Abused:      Physically Abused:      Sexually Abused:             Mental Status Examination     Carmel presented as oriented X3 with speech and language  intact.  The patient was cooperative throughout the evaluation with no signs of hallucinations, delusions, loosening of associations or other thought disturbance.  Mood was normal Affect was congruent with mood. Insight and judgement were intact.  Memory appeared intact for recent and remote elements.  There was no report of suicidal ideation, intention or plan. Attention and concentration were within normal.      Copy:   Dayana Calle               No referring provider defined for this encounter.    Jesus Quigley PsyD, , [facility]  Diplomate, Behavioral Sleep Medicine  [facility]    Note: This dictation was created using voice recognition software. This document may contain an error not identified before finalizing the document. If the error changes the accuracy of the document, I would appreciate it being brought to my attention.

## 2021-11-01 NOTE — PATIENT INSTRUCTIONS
CBT-I Introductory Module    Understanding Sleep and Insomnia    Most people have trouble sleeping at some point in their life.   Chronic insomnia means you have had trouble falling asleep and/or staying asleep for at least the past three months.  Despite allowing enough time for sleep, it is affecting how you feel. You are not alone.  It is estimated that 10-15% of adults experience chronic insomnia.     Cognitive Behavioral Therapy for Insomnia (CBT-I)    Consistent with the guidelines of the American [employer], [facility] recommends  Cognitive Behavioral Therapy for Insomnia (CBT-I) as the first-line treatment for insomnia.  CBT-I targets factors that lead to long-term insomnia:    ? Behavioral Conditioning    When you lay awake in bed over many nights, your body actually becomes trained or 'conditioned' to be awake during the night.  It makes the bed associated with alertness instead of sleepiness.    ? Habits that weaken your body's sleep drive and circadian sleep rhythm    Changing sleep schedules, extended time in bed, using mobile devices and computers close to bedtime, and naps too late in the day can harm your sleep at night.    ? Emotional and Physical Arousal    Things such as worrying about sleep, stress, drinking too much caffeine, and not winding down before bed can interfere with your body's natural sleep drive.    Understanding Sleep and Insomnia    [facility] CBT-I is a four-part program that teaches you the skills needed for a better night's sleep. The first step in your program is learning a bit about sleep and insomnia.      The Basics of Sleep    How does sleep help us?    Sleep, like food and water, is something we need every day. The purpose of sleep is still not exactly clear, but sleep experts agree we need consistent quality sleep to function at our best. There is evidence that sleep helps maintain brain and body functions.  It helps maintain thinking  ability and mood.  Sleep is actually a very active part of life. Sleep occurs in four stages that cycle every  minutes throughout the night. We get our deepest sleep during the first few hours of sleep.  During the last half of our sleep, we usually get the bulk of our REM (Rapid Eye Movement) sleep.  REM sleep is when most of our dreaming occurs.    How much sleep do we need?    Sleep needs vary from person to person. Most adults need between 6-8 hours of sleep.  Sleeping too little or too much may be a health risk.  As we age, most people report their sleep gets lighter, earlier, shorter, and more restless.     What Controls Our Sleep?    The three things that regulate your sleep are your sleep drive, biological clock and your arousal system (emotional and physical).  Together these make us feel alert during the day and promote sleep at night.    Your sleep drive depends on how long you have been awake. It is lowest when you first wake up.  Sleep drive gradually increases as the day goes on.  The longer time you are awake the easier it is to fall asleep.  Sleeping gradually reduces your sleep drive. That is why napping in the evening or close to bed can make it harder to sleep at night.    Your biological clock promotes wakefulness during the day and sleep at night.        Figure 1 two process sleep model (source: 1. Claire J, Sarah R, Sebastian T, et al. Future research directions in sleep and ADHD: report of a consensus working group. J Atten Disord. 2013;17(7):550-564. Doi:10.1177/[id number])    Understanding Insomnia    What causes insomnia?    Some people have a greater likelihood of developing insomnia due to genetics, personality or age. A host of things can trigger it: jet lag, working a different shift, medication, or the onset of a medical or mental health condition.             Insomnia and Your Arousal System    Mental activity, emotions and physical symptoms can make your brain too active to  "sleep by masking the strength of your sleep drive. Your brain's arousal system not only triggers insomnia, it plays a role in maintaining it as well.  Common sources of arousal include:    ? Worry about sleep in bed  ? An active mind concerned about unfinished tasks  ? Anxiety, Stress and Depression  ? Pain     What maintains insomnia?    Short-term insomnia can become chronic when you begin to feel fearful, worried and  on guard  about sleep loss. As you spend more time in bed or try forcing yourself to sleep your bed becomes linked with wakefulness.  This is why people with insomnia commonly report feeling tired before bed but suddenly more alert when getting into bed.  This type of  conditioning  along with unhelpful sleep habits maintains the insomnia even when the triggering event has resolved.    Tracking your Sleep    Sleep tracking is an essential part of training yourself to sleep better and monitoring your progress.  There are several ways to keep track of your sleep habits.    CBT-I  Timothy    The CBT-i  timothy is a convenient way to track your sleep on your mobile phone. We strongly recommend using it if you have a mobile phone. After downloading the free timothy, simply go to My Sleep >Sleep Diary > Add New Entry and record your entry for the day. Your entries should be based on your recollection of the past night of sleep. The timothy graphs your information and has helpful reminders. If you are working with a provider go to Settings and turn on  Working with a CBT-I Provider.   You can also e-mail a spreadsheet of your data to yourself by pressing \"[facility] User Data\" in the timothy settings. .Make sure to have your data available at the time of your first visit.             CBT-I Module - Sleep Consolidation Training      Now that you understand a bit more about how sleep works and what causes insomnia, you are ready to move on to the core of CBT-I called Sleep Consolidation Training. It will be important that " you continue to keep track of your sleep using your CBTi  Timothy or your paper sleep diary.  This process involves five core strategies that will:    ? Strengthen your sleep drive and circadian sleep rhythm  ? Strengthen the link between your bed and sleep    To prepare for Sleep Consolidation Training, we recommend you make the following changes to set the stage for a better night's sleep:    ? Reduce your consumption of caffeine and alcohol.  Both can disrupt sleep and make strengthening your sleep more difficult.  Specifically:    - Avoid caffeine within 6 hours of bedtime   - No more than 3 caffeinated beverages per day (e.g. 8 oz. cup coffee or 12 oz. cup soda)           - No alcohol within 3 hours of bedtime    ? Make sure your bedroom is quiet, comfortable and dark.  Noise, light and an uncomfortable sleep space can harm your sleep.      Core Sleep Consolidation Strategies    Much like physical activity and eating nutritious foods strengthens our body, studies show that the following core strategies are the key to achieving stronger, healthier sleep.     1. Reduce your Time In Bed    Spending extra time in bed trying to get more sleep actually can cause more sleep disruption and weaken sleep efficiency. Sleep efficiency is simply the percentage of time a person spends asleep while in bed. Normal sleep efficiency is thought to be 85% or greater.  By reducing your time in bed, you will be awake longer leading to quicker and deeper sleep. This strategy does not reduce the amount of sleep you get, just the time you are awake in bed:                                             During the first step of sleep consolidation training you will reduce your time in bed and maintain the following Sleep Schedule Prescription. Your prescription is determined by the average nightly amount of sleep you got over the past two weeks plus 30 minutes. It also takes into account the best time for you to sleep. The least amount of  time prescribed is 6 hours in bed.    Your Sleep Schedule Prescription                    Total Time in Bed:  8 hours                  Bedtime:  No earlier than Midnight                   Wake-up Time:  Out of bed every day by 8 am with alarm     2.  Don't go to bed until you feel sleepy (not tired or fatigued)     This helps increase your sleep drive by keeping you awake longer.  If you go to bed when you're not sleepy, it gives your brain the wrong message and can lead to frustration.     If you feel sleepy before your prescribed bedtime, find activities that can help you stay awake until it is time for you to go to bed. Even brief naps in the evening can be very disruptive of sleep at night.     3.  Don't stay in bed unless you are sleeping      If you are unable to fall asleep or return to sleep after about 30 minutes, get out of bed. This helps train your brain that the bed is for sleep. It is harmful to your sleep when you are worried or frustrated in bed. Do not read, eat, worry, think about sleep, use mobile phones or tablets, or watch TV in bed. Do not watch the clock during the night.     Go to another room and do something relaxing. Plan things you can do when you get out of bed. Avoid use of mobile devices or computers when out of bed.    Return to bed only when you feel sleepy again.  Repeat as often as needed throughout the night.     4.  Get out of bed at the same time every day    Getting up at the same time helps set your biological clock. It is important to stick to your wake time no matter how much sleep you got the night before or how you feel in the morning.    Varying your wake can have the same effect as jet lag.  It disrupts your biological clock and makes you feel more tired and exhausted.  Trying to  catch up  on sleep on the weekends only makes things worse and sets you up for a cycle of poor sleep the following weekdays.      Make sure you set an alarm and keep it away from the bed to  prevent looking at the clock during the night.      5.  Avoid daytime napping    Avoid daytime napping if possible. Napping partially fulfills your need for sleep and weakens your sleep drive at night.    However, if you find yourself sleepy (not just tired) you can take a brief 15-30 minute nap during the day if needed.  Naps within 7-8 hours of your prescribed wake time are less likely to affect your sleep the coming night.  Sleepy people make more mistakes and may hurt themselves or others. Therefore, safety trumps all other sleep prescriptions.  Never drive or operate equipment if drowsy or sleepy.     Changing Your Sleep Schedule Prescription    After two weeks, you can adjust your sleep schedule prescription based on how well you are sleeping. Use the following guidelines to change your prescribed time in bed based on information from your Sleep Diary, Mobile Sleep Diary Timothy or Wearable Device:          Increase Time in Bed by 15 Minutes IF:    ? Your CBT-I  timothy indicates your sleep efficiency has been greater than 90%.                Decrease Time in Bed by 15 Minutes IF:    ? Your CBT-I  Timothy indicates your sleep efficiency has been less than 80%.    ? DO NOT decrease your sleep schedule below 6 hours unless instructed by your provider.         CBT-I Supplementary Module - Cognitive Training    Developing Healthy Sleep Thoughts    Insomnia is often triggered by stressful events such as a change in employment, a separation, medical illness, or loss.  How you handle your sleep problem, mainly your thoughts and behaviors, determines in large part whether your sleeplessness is short term or develops into chronic insomnia well after the stressful event is over.     This  part of your program involves learning a set of skills to change negative and worrisome thoughts about sleep.   Insomnia is more likely to persist if you interpret the onset as a threat or loss of control.  Worry, fears and untrue  "beliefs about insomnia can become a vicious cycle.  Negative sleep thoughts activate the physical and emotional systems of your body.  This strengthens wakefulness and weakens your sleep system.  This in turn produces increased stress and pressure to sleep or undo \"sleep effort.\"    Changing How You Think About Insomnia    We now know that our thoughts and attitudes affect our stress response.  Negative sleep thoughts can worsen your insomnia. They can lead to greater fear or anxiety about sleep, which in turn can aggravate your sleep problem. Thinking more positively and realistically about your sleep promotes its healing.     Myths about Sleep    ? People need 8 hours of sleep     This is untrue.  Sleep needs vary from person to person.  Most people need between 6-8 hours of sleep to feel alert during the day.  People who sleep 7 hours live longer than those who sleep 8 hours.  Research also suggests people who sleep 5 hours live longer than those who sleep 9 hours    ? Insomnia Causes Dementia        While there has been a great deal of research exploring the causes of dementia, there is      no conclusive evidence that insomnia causes dementia.  We do know that the rate of       Insomnia is higher in a host of health conditions that have been associated with increased      risk of certain types of dementia.  In general, people with primary insomnia perform similar      To normal sleepers in tests of neurocognitive ability.    ? Insomnia is the same as sleep deprivation    Sleep deprivation is lack of sleep due habits and circumstanced that prevent enough time for sleep.  This is typically not true for insomnia where people have enough time for sleep and even extend their sleep time trying to get more sleep.  Most people with insomnia get about the same amount of sleep as normal sleepers.  The difference is that with insomnia the sleep is fragmented and less consolidated.       You are Getting More Sleep than You " Think    Research using objective sleep tests reveal that people with insomnia get an average of one hour more sleep than they think. This is due in part because the brain misperceives Stage 1 and 2 sleep as being awake.  In addition, stress and arousal while awake in bed changes the brain's perception of time awake.    Examples of Unhealthy Sleep Thoughts    Negative sleep thoughts can have a profound impact on your ability to get a good night's sleep. Below are some examples of negative thoughts associated with insomnia that may sound familiar to you:    ? I must get 8 hours of sleep to function during the day.  ? I shouldn't wake up at all in the middle of the night.  ? Insomnia is going to cause health problems.  ? I am dreading going to bed.  ? I woke up early again.  I know I won't get back to sleep.  ? The reason I feel terrible today is because of my insomnia.  ? I've totally lost control of my sleep.  ? I can't sleep without a sleeping pill.    Negative thoughts usually occur automatically and feel like a knee-jerk reaction.  They are often untrue or distorted, especially late in the evening as you become increasingly tired and others are asleep.      Changing Unhealthy Sleep Thoughts    Now that you understand the impact negative thoughts can have on your sleep, you are ready to change these unhelpful thoughts.  The strategy is powerful and simple:  By recognizing and replacing your negative thoughts about sleep with more accurate, positive thoughts, you will be less anxious and frustrated about your sleep. A more realistic and positive attitude about sleep will allow you to relax and sleep more easily through the night.           There are several important steps involved in changing your unhelpful and negative thoughts about sleep:            Examples of Healthy Sleep Thoughts    ? My work will not suffer much if I have a poor night's sleep.    ? I'm probably getting more sleep than I think.    ? Other  things than my sleep affect my daytime functioning.    ? Because I didn't sleep well last night, I am more likely to sleep well tonight because of increased sleep drive that leads to deeper sleep.    ? Sleep requirements vary from one person to another.    ? If I don't sleep well, most of the time the worst that can happen is I may feel a bit more tired later in the day or my mood might not be as bright.    ? If I awaken after about 4-5 hours of sleep, I have gotten the core sleep I need for the day.    ? I'm more likely to fall asleep the longer I've been awake    ? I'm more likely to fall asleep as my body temperature begins to decrease through the night.    ? My body's wakefulness system takes charge during the day to promote daytime functioning.    ? These sleep skills have worked for others, and they can work for me.    Other Recommendations for Changing Beliefs and Attitudes   About Your Sleep    ? Keep Realistic Expectations     There is a widespread belief that 8 hours of sleep is necessary to feel refreshed and function well during the day. Many believe that good sleep means never waking up at night.  Others come to expect that they should always wake up in the morning feeling full of energy.  Concerns may arise when your actual sleep falls short of these expectations. Try to avoid placing undue pressure on yourself to achieve certain sleep levels.  It only increases anxiety about sleeping.  Focus on quality sleep not quantity of sleep.    ? Revise Your Thoughts about the Causes of Insomnia      There is a natural tendency to attribute our sleep problems completely to external factors such as a chemical imbalance, pain, aging or things over which we may have little control.  Although these factors may contribute to your insomnia, research shows CBT-I is beneficial even if they are present.    ? Don't Blame Insomnia for All Daytime Impairments      Many individuals blame insomnia for every symptom or concern  they experience during the day from fatigue to lack of concentration. Though poor sleep may produce some of these symptoms, it us usually untrue that all daytime impairment is attributable to insomnia.  It is more often that other factors such as stress and co-occurring medical problems contribute more to how you feel during the day.      ? Don't Catastrophize      Catastrophic thinking means making a sleep mountain out of a molehill.  Some people believe poor sleep will have catastrophic consequences to their physical health, mental health and appearance. Others see insomnia as a complete loss of control.  These perceived consequences of insomnia often prompt people to seek medication or other treatment.  Keep in mind insomnia can be very unpleasant but for the most part is not dangerous.    ? Don't Focus on Sleep     Some people reduce their activity level because of poor sleep.  Although sleep is a necessary part of life, don't make it the focus of your thoughts and concern. Trust that if you engage in health sleep habits, your body will give you the sleep it needs.  Make sure you continue your normal activities despite your insomnia.    ? Never Try to Sleep      Of all the habits the most important one is this:  Never try to force yourself to sleep.  Doing so usually backfires and makes things worse. Instead, focus on keeping to your prescribed sleep schedule, getting up at the same time every day and using the bed only for sleep.

## 2021-11-02 ENCOUNTER — VIRTUAL VISIT (OUTPATIENT)
Dept: SLEEP MEDICINE | Facility: CLINIC | Age: 51
End: 2021-11-02
Payer: COMMERCIAL

## 2021-11-02 DIAGNOSIS — F51.04 CHRONIC INSOMNIA: Primary | ICD-10-CM

## 2021-11-02 DIAGNOSIS — G47.33 OSA (OBSTRUCTIVE SLEEP APNEA): ICD-10-CM

## 2021-11-02 PROCEDURE — 90791 PSYCH DIAGNOSTIC EVALUATION: CPT | Mod: GT | Performed by: PSYCHOLOGIST

## 2021-11-04 ENCOUNTER — HOSPITAL ENCOUNTER (OUTPATIENT)
Dept: PHYSICAL THERAPY | Facility: REHABILITATION | Age: 51
End: 2021-11-04
Payer: COMMERCIAL

## 2021-11-04 DIAGNOSIS — M95.8 BILATERAL WINGED SCAPULA: ICD-10-CM

## 2021-11-04 DIAGNOSIS — R29.3 POOR POSTURE: ICD-10-CM

## 2021-11-04 DIAGNOSIS — G95.0 SYRINGOMYELIA (H): Primary | ICD-10-CM

## 2021-11-04 PROCEDURE — 97110 THERAPEUTIC EXERCISES: CPT | Mod: GP | Performed by: PHYSICAL THERAPIST

## 2021-11-04 NOTE — PROGRESS NOTES
"   11/04/21 1000   Signing Clinician's Name / Credentials   Signing clinician's name / credentials Crys Mullins, GOPI   Session Number   Session Number 4/8   Ortho Goal 1   Goal Description Patient will be independent with HEP and self management of condition in 8 weeks.    Goal Progress not met. Patient not compliant with home program.    Ortho Goal 2   Goal Description patient will demonstrate correct posture in standing/sitting without cues in 6 weeks.    Goal Progress progressing. Not consistent with it.    Ortho Goal 3   Goal Description Patient will report 50% decrease in nocturnal arm symptoms in 8 weeks.    Goal Progress not met.    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 Having tension/pain in (R) post thigh. Doing treatment for insomnia issues with benefit. Doing exercises 1x/day.    Objective Measure 1   Details Mod scap winging with scap stabilization with hands at shoulder height. Needs cues for neutral posture using abdominals and lats.    Therapeutic Procedure/exercise   Therapeutic Procedures: strength, endurance, ROM, flexibillity minutes (09195) 25   Skilled Intervention ther ex   Treatment Detail prone LT retraining 8-10 x 5-10\", scap stab/hands on wall 8-10 x/*w/wt shift 8-10 x , *low row/lat pulldown 10 x orange    Assessments Completed   Assessments Completed HEP/POC compliance is improving .Patient demonstrates understanding/independence with home program. Response to Intervention is fair. Patient is appropriate to continue with skilled physical therapy intervention, as indicated by initial plan of care.   Education   Learner Patient   Readiness Acceptance   Method Booklet/handout;Demonstration   Response Demonstrates Understanding   Plan   Homework Will look into wedge/props for sleeping, work on HEP compliance. Previous HEP: sup cerv rot, scap retr, UT/levator stretchs, rows, (B) ER/band, wall clock/band, " "flex/serratus/band   Home program scap retraction 10 x 5\", prone LT retraining w/arm lift, 8-10 x 5-10\", rows 10 x orange, pec stretch/corner 5 x 10-15\" , (B) UT stretch , scap stab/hands on wall    Plan for next session Continue with initial POC. Progress with HEP, posture education/strengthening, additional assessment as needed.    Comments   Comments ref provider: Dayana Calle DO   Total Session Time   Timed Code Treatment Minutes 25   Total Treatment Time (sum of timed and untimed services) 25   AMBULATORY CLINICS ONLY-MEDICAL AND TREATMENT DIAGNOSIS   PT Diagnosis Syringomyelia, poor posture, scapular winging     "

## 2021-11-09 ENCOUNTER — MYC MEDICAL ADVICE (OUTPATIENT)
Dept: FAMILY MEDICINE | Facility: CLINIC | Age: 51
End: 2021-11-09
Payer: COMMERCIAL

## 2021-11-09 DIAGNOSIS — H69.90 DYSFUNCTION OF EUSTACHIAN TUBE, UNSPECIFIED LATERALITY: Primary | ICD-10-CM

## 2021-11-10 NOTE — TELEPHONE ENCOUNTER
Patient has failed conservative therapy for eustachian tube dysfunction.  We will send her to ENT.    Dayana Sharma

## 2021-11-18 ENCOUNTER — IMMUNIZATION (OUTPATIENT)
Dept: NURSING | Facility: CLINIC | Age: 51
End: 2021-11-18
Payer: COMMERCIAL

## 2021-11-18 PROCEDURE — 0004A PR COVID VAC PFIZER DIL RECON 30 MCG/0.3 ML IM: CPT

## 2021-11-18 PROCEDURE — 91300 PR COVID VAC PFIZER DIL RECON 30 MCG/0.3 ML IM: CPT

## 2021-12-15 ENCOUNTER — DOCUMENTATION ONLY (OUTPATIENT)
Dept: SLEEP MEDICINE | Facility: CLINIC | Age: 51
End: 2021-12-15
Payer: COMMERCIAL

## 2021-12-15 NOTE — PROGRESS NOTES
Patient called last night to see if there were any openings for CBTI follow up.  I called patient and told we had no openings before her December 28th appointment.

## 2021-12-20 ENCOUNTER — HOSPITAL ENCOUNTER (OUTPATIENT)
Dept: PHYSICAL THERAPY | Facility: REHABILITATION | Age: 51
End: 2021-12-20
Payer: COMMERCIAL

## 2021-12-20 DIAGNOSIS — G95.0 SYRINGOMYELIA (H): Primary | ICD-10-CM

## 2021-12-20 DIAGNOSIS — M95.8 BILATERAL WINGED SCAPULA: ICD-10-CM

## 2021-12-20 DIAGNOSIS — R29.3 POOR POSTURE: ICD-10-CM

## 2021-12-20 PROCEDURE — 97535 SELF CARE MNGMENT TRAINING: CPT | Mod: GP | Performed by: PHYSICAL THERAPIST

## 2021-12-20 PROCEDURE — 97110 THERAPEUTIC EXERCISES: CPT | Mod: GP | Performed by: PHYSICAL THERAPIST

## 2021-12-20 NOTE — PROGRESS NOTES
12/20/21 1500   Signing Clinician's Name / Credentials   Signing clinician's name / credentials Crys Mullins, PT   Session Number   Session Number 5/8   Ortho Goal 1   Goal Description Patient will be independent with HEP and self management of condition in 8 weeks.    Goal Progress not met. Patient not compliant with home program.    Ortho Goal 2   Goal Description patient will demonstrate correct posture in standing/sitting without cues in 6 weeks.    Goal Progress progressing. Not consistent with it.    Ortho Goal 3   Goal Description Patient will report 50% decrease in nocturnal arm symptoms in 8 weeks.    Goal Progress not met.    Subjective Report   Subjective Report Returns after about 6 weeks. She has not been following through with the home program, says she's worse off than when she started PT. Not able to secure band for lat pulldown. Having recurrence of somatic panic symptoms, reports neural/autonomic hypersensitivity.  She is working with a provider on this issue.. Hasn't figure out sleep position, using pillows etc, sometimes wakes with arm tingling. Hasn't purchased a wedge. Calf cramping; thinks it's dehydration related.    Objective Measure 1   Details Mod/major scapular winging R>L with closed chain activities.    Therapeutic Procedure/exercise   Therapeutic Procedures: strength, endurance, ROM, flexibillity minutes (08011) 15   Skilled Intervention ther ex   Treatment Detail scap stab/hands on wall 8-10 x/*w/wt shift or hand lift 8-10 x , *low row/lat pulldown 10 x orange    Self Care/home Management   ADL/Home Mgmt Training (86355) 10   Skilled Intervention home program   Treatment Detail discussed exercise/HEP compliance, problem solving for home exercise set up with theraloop and extra band, discussed sleeping wedge again   Assessments Completed   Assessments Completed HEP/POC compliance is improving .Patient demonstrates understanding/independence with home program. Response to Intervention  "is fair. Patient is appropriate to continue with skilled physical therapy intervention, as indicated by initial plan of care.   Education   Learner Patient   Readiness Acceptance   Method Booklet/handout;Demonstration   Response Demonstrates Understanding   Plan   Homework Discussed theraloop for band exercises. Will look into wedge/props for sleeping, work on HEP compliance. Previous HEP: sup cerv rot, scap retr, UT/levator stretchs, rows, (B) ER/band, wall clock/band, flex/serratus/band   Home program scap retraction 10 x 5\", prone LT retraining w/arm lift, 8-10 x 5-10\", rows 10 x orange, pec stretch/corner 5 x 10-15\" , (B) UT stretch , scap stab/hands on wall , lat pulldowns   Plan for next session Follow up next month. Patient to work on HEP and report her progress.    Comments   Comments ref provider: Dayana Calle,    Total Session Time   Timed Code Treatment Minutes 25   Total Treatment Time (sum of timed and untimed services) 25   AMBULATORY CLINICS ONLY-MEDICAL AND TREATMENT DIAGNOSIS   PT Diagnosis Syringomyelia, poor posture, scapular winging     "

## 2021-12-26 ASSESSMENT — SLEEP AND FATIGUE QUESTIONNAIRES
HOW LIKELY ARE YOU TO NOD OFF OR FALL ASLEEP WHILE SITTING AND TALKING TO SOMEONE: WOULD NEVER DOZE
HOW LIKELY ARE YOU TO NOD OFF OR FALL ASLEEP WHILE SITTING QUIETLY AFTER LUNCH WITHOUT ALCOHOL: WOULD NEVER DOZE
HOW LIKELY ARE YOU TO NOD OFF OR FALL ASLEEP WHILE SITTING INACTIVE IN A PUBLIC PLACE: WOULD NEVER DOZE
HOW LIKELY ARE YOU TO NOD OFF OR FALL ASLEEP IN A CAR, WHILE STOPPED FOR A FEW MINUTES IN TRAFFIC: WOULD NEVER DOZE
HOW LIKELY ARE YOU TO NOD OFF OR FALL ASLEEP WHEN YOU ARE A PASSENGER IN A CAR FOR AN HOUR WITHOUT A BREAK: WOULD NEVER DOZE
HOW LIKELY ARE YOU TO NOD OFF OR FALL ASLEEP WHILE WATCHING TV: WOULD NEVER DOZE
HOW LIKELY ARE YOU TO NOD OFF OR FALL ASLEEP WHILE LYING DOWN TO REST IN THE AFTERNOON WHEN CIRCUMSTANCES PERMIT: MODERATE CHANCE OF DOZING
HOW LIKELY ARE YOU TO NOD OFF OR FALL ASLEEP WHILE SITTING AND READING: WOULD NEVER DOZE

## 2021-12-28 ENCOUNTER — VIRTUAL VISIT (OUTPATIENT)
Dept: SLEEP MEDICINE | Facility: CLINIC | Age: 51
End: 2021-12-28
Payer: COMMERCIAL

## 2021-12-28 VITALS — BODY MASS INDEX: 22.73 KG/M2 | WEIGHT: 150 LBS | HEIGHT: 68 IN

## 2021-12-28 DIAGNOSIS — F51.04 CHRONIC INSOMNIA: Primary | ICD-10-CM

## 2021-12-28 PROCEDURE — 90832 PSYTX W PT 30 MINUTES: CPT | Mod: TEL | Performed by: PSYCHOLOGIST

## 2021-12-28 ASSESSMENT — MIFFLIN-ST. JEOR: SCORE: 1338.9

## 2021-12-28 NOTE — PATIENT INSTRUCTIONS
Your BMI is Body mass index is 22.81 kg/m . and well within normal range.    Recommendations to focus on consistent sleep schedule and allow for an 8 hour sleep schedule.  Use the bed for sleep and rest with a focus on making sure you get up at the same time every day regardless of how well you have slept.    Followup with sleep medicine physician if you wish to restart CPAP

## 2021-12-28 NOTE — PROGRESS NOTES
"Carmel is a 51 year old who is being evaluated via a billable video visit.      How would you like to obtain your AVS? MyChart  If the video visit is dropped, the invitation should be resent by: Send to e-mail at: [email or web address]  Will anyone else be joining your video visit? No    Visit Start Time: 3:31 PM  Telephone -Visit Details    Type of service:  Video Visit    Video End Time:3:55 PM    Originating Location (pt. Location): Home    Distant Location (provider location):  [facility]     Platform used for Video Visit: [facility]     SLEEP MEDICINE VIRTUAL VIDEO FOLLOW-UP VISIT  Sleep Psychology    Patient Name: Carmel Aguilar  MRN:  [id number]  Date of Service: Dec 28, 2021       Subjective Report     Carmel Aguilar  returns for a telehealth video visit to discuss progress in implementing behavioral strategies for the management of insomnia.  Patient consent for initiation of video visit was obtained and documented prior to initiation of visit.     Carmel reports she had some initial challenges with the behavioral prescription due to daylight stavings time.  She elected to go to bed earlier and getting up earlier.    She discussed new school schedule that will require her to get up at 6 am.  Discussed strategies to manage morning schedule and get ready for school.     She noticed that after starting to doing her schedule she noticed more sensitivity in her mouth and wondering if dry mouth or teeth grinding may be increasing.      She notices that her somatic anxiety has been increasing.  She feels an increase in 'sweat stress.\"      She reports more anxiety and stress response approaching and during her sleep period.  She has in the past used propranolol.      Patient feels dependent on trazodone and states she feels more depressed if she decreases the dose.    She also reports adverse effects of prazosin as well.    She reports an increase in stress due to school-related stress. " "    She continues to have vivid dreams and feels she is going to sleep faster and feels she is getting deeper sleep.    Patient wonders if she should retry CPAP, last tried a year ago.  She will be working with an orthodontist to see if a dental appliance         .     .     Sleep Data:     Source of Sleep Estimates:  verbal self-report      EPWORTH SLEEPINESS SCALE    Sitting and reading 0   Watching TV 0   Sitting, inactive in a public place (theatre or mtg.) 0    As a passenger in a car 0   Lying down to rest in the afternoon when circumstance permit 2   Sitting and talking to someone 0   Sitting quietly after lunch without alcohol 0   In a car, while stopped for a few minutes in traffic 0   TOTAL SCORE (nl <11) 2     INSOMNIA SEVERITY INDEX     Difficulty falling asleep 1   Difficult staying asleep 2   Problems waking up to early 1   How SATISFIED/DISSATISFIED are you with your CURRENT sleep pattern? 2   How NOTICEABLE to others do you think your sleep pattern is in terms of your quality of life? 0   How WORRIED/DISTRESSED are you about your current sleep pattern? 1   To what extent do you consider your sleep problem to INTERFERE with your daily fuctioning(e.g. daytime fatigue, mood, ability to function at work/daily chores, concentration, mood,etc.) CURRENTLY? 3   INSOMNIA SEVERITY INDEX TOTAL SCORE 10    Absence of insomnia (0-7); sub-threshold insomnia (8-14); moderate insomnia (15-21); and severe insomnia (22-28)       Interventions     Strategies and recommendations including implementation and maintenance of of stimulus control and sleep and anxiety were discussed today.       Vital Signs     Ht 1.727 m (5' 8\")   Wt 68 kg (150 lb)   BMI 22.81 kg/m       Mental Status     Orientation:  X3  Mood:  normal and anxious  Affect:  Congruent with mood  Speech/Language:  Normal  Thought Process: Intact  Associations:  Normal  Thought Content: Normal  Patient does not report any suicidal ideation, intention or " plan.    Diagnostic Impressions and Plan     Chronic insomnia    Multifactorial insomnia that initially appeared to respond to stimulus control though somatic anxiety appears to be affecting adherence to recommendations.  Patient was advised to continue to follow with her psychiatrist around management of mental health concerns.  She also agreed to focus on maintaining consistent sleep window 8 hours and a consistent wake time rather than the details of stimulus control.    MIld Sleep Apnea (per history)    Patient indicates her plan is to see an orthodontist for dental care prior to considering oral appliance for treatment of sleep apnea.  She is interested in revisiting CPAP but notes some anxiety around use of mask and challenges with mouth breathing.  She was advised to follow-up with her sleep medicine provider regarding treatment.  Plan:  Adjust sleep schedule and plan  With a focus on maintaining a consistent sleep window of 8 hours and a consistent wake time.    Follow-up: 2 months      Jesus Quigley PsyD, JAYDEN, DBSM  Diplomate, Behavioral Sleep Medicine  [facility]      Note: This dictation was created using voice recognition software. This document may contain an error not identified before finalizing the document. If the error changes the accuracy of the document, I would appreciate it being brought to my attention.

## 2021-12-31 ENCOUNTER — OFFICE VISIT (OUTPATIENT)
Dept: AUDIOLOGY | Facility: CLINIC | Age: 51
End: 2021-12-31
Payer: COMMERCIAL

## 2021-12-31 ENCOUNTER — OFFICE VISIT (OUTPATIENT)
Dept: OTOLARYNGOLOGY | Facility: CLINIC | Age: 51
End: 2021-12-31

## 2021-12-31 DIAGNOSIS — H90.42 SENSORINEURAL HEARING LOSS (SNHL) OF LEFT EAR WITH UNRESTRICTED HEARING OF RIGHT EAR: Primary | ICD-10-CM

## 2021-12-31 DIAGNOSIS — K21.9 GASTROESOPHAGEAL REFLUX DISEASE WITHOUT ESOPHAGITIS: ICD-10-CM

## 2021-12-31 DIAGNOSIS — F45.8 BRUXISM (TEETH GRINDING): ICD-10-CM

## 2021-12-31 DIAGNOSIS — H69.90 DYSFUNCTION OF EUSTACHIAN TUBE, UNSPECIFIED LATERALITY: ICD-10-CM

## 2021-12-31 DIAGNOSIS — H93.25 AUDITORY PROCESSING DISORDER: Primary | ICD-10-CM

## 2021-12-31 PROCEDURE — 92567 TYMPANOMETRY: CPT | Performed by: AUDIOLOGIST

## 2021-12-31 PROCEDURE — 99207 PR NO CHARGE LOS: CPT | Performed by: AUDIOLOGIST

## 2021-12-31 PROCEDURE — 92557 COMPREHENSIVE HEARING TEST: CPT | Performed by: AUDIOLOGIST

## 2021-12-31 PROCEDURE — 99204 OFFICE O/P NEW MOD 45 MIN: CPT | Performed by: OTOLARYNGOLOGY

## 2021-12-31 RX ORDER — AMOXICILLIN 500 MG
1200 CAPSULE ORAL DAILY
COMMUNITY

## 2021-12-31 RX ORDER — AZELASTINE 1 MG/ML
1 SPRAY, METERED NASAL 2 TIMES DAILY
Qty: 30 ML | Refills: 11 | Status: SHIPPED | OUTPATIENT
Start: 2021-12-31 | End: 2022-01-17

## 2021-12-31 NOTE — PATIENT INSTRUCTIONS
Patient Education     TMJ Syndrome  The temporomandibular joint (TMJ) is the joint that connects your lower jaw to your skull. You can feel it in front of your ears when you open and close your mouth. TMJ disorders cause chronic or recurrent pain and problems in the jaw joint and the muscles that control jaw movement. Symptoms of TMJ disorders are usually relieved with minor treatments. But symptoms may come back, especially in times of stress.   Causes  There is no widely agreed-on cause of TMJ disorders. They have been linked to injury, arthritis, tooth and jaw alignment, chronic fatigue syndrome, and fibromyalgia. A definite connection has not been shown to these, though.   Symptoms    Pain in the face, jaw, or neck    Pain with jaw movement or chewing    Locking or catching sensation of the jaw    Clicking, popping, or grinding sounds with movement of the TMJ    Headache    Ear pain    Home care  Modest treatments are a good first step toward relieving symptoms. Try these methods.     Reduce stress on your jaw by not eating crunchy or hard-to-chew foods. Don t eat hard or sticky candies. Soft foods and liquids are easier on the jaw.    Protect your jaw while yawning. If you need to yawn, put your fist under your chin to prevent your mouth from opening too wide.    To help relieve pain, put hot or cold packs on the area that hurts. Try both hot and cold to find out which works best for you. To make a cold pack, put ice cubes in a plastic bag that seals at the top. Wrap the bag in a clean, thin towel or cloth. Never put ice or an ice pack directly on the skin. If you use hot packs (small towels soaked in hot water), be careful not to burn yourself.    You may take acetaminophen or ibuprofen for pain, unless you were given a different pain medicine. (Note: If you have chronic liver or kidney disease or have ever had a stomach ulcer or gastrointestinal bleeding, talk with your healthcare provider before using these  medicines. Also talk to your provider if you are taking medicine to prevent blood clots.) Don t give aspirin to a child younger than age 19 unless directed by the child s provider. Taking aspirin can put a child at risk for Reye syndrome. This is a rare but very serious disorder that most often affects the brain and the liver.  Reducing stress  If stress seems to be contributing to your symptoms, try to find the sources of stress in your life. These aren t always obvious. Common stressors include:     Everyday hassles. These include things such as traffic jams, missed appointments, or car trouble.    Major life changes. These can be good, such as a new baby or job promotion. Or they can be bad, such as losing a job or losing a loved one.    Overload. This is the feeling that you have too many responsibilities and can't take care of everything at once.    Helplessness. This is when you feel like your problems are more than you can solve.  When possible, try to make changes in your sources of stress. See if you can avoid hassles, limit the amount of change in your life at one time, and take breaks when you feel overloaded.   Many stressful situations can't be avoided. So learning how to manage stress is very important. To make everyday stress more manageable:     Getting regular exercise.    Eat nutritious, balanced meals.    Get enough rest.    Try relaxation and breathing exercises, visualization, biofeedback, or meditation.    Take some time out to clear your mind.  For more information, talk with your healthcare provider.  Follow-up care  Follow up with your healthcare provider, or as advised. More testing and other treatment may be needed. If changes to your lifestyle do not improve your symptoms, talk with your healthcare provider about other treatments. These include bite guards for help with teeth grinding, stress management methods, and more. If stress is an important factor and does not respond to the above  lifestyle changes, talk with your healthcare provider about a referral for stress management.   If X-rays were done, they will be reviewed by a specialist. You will be told the results and if they affect your treatment.   Call 911  Call 911 if you have any of these:     Trouble breathing or swallowing    Wheezing    Confusion    Extreme drowsiness or trouble awakening    Fainting or loss of consciousness    Fast heart rate  When to seek medical advice  Call your healthcare provider right away if you have any of these:     Swollen or red face    Jaw or face pain that gets worse    Neck, mouth, tooth, or throat pain that gets worse    Fever of 100.4 F (38 C) or higher, or as directed by your healthcare provider  [email or web address] last reviewed this educational content on 8/1/2020 2000-2021 [employer]. All rights reserved. This information is not intended as a substitute for professional medical care. Always follow your healthcare professional's instructions.

## 2021-12-31 NOTE — PROGRESS NOTES
AUDIOLOGY REPORT    SUMMARY: Audiology visit completed. See audiogram for results.      RECOMMENDATIONS: Follow-up with ENT. Continue working with Melissa Adams for management of APD.    Gary Andrade, CCC-A  Clinical Audiologist  [place] #[id number]

## 2021-12-31 NOTE — LETTER
12/31/2021         RE: Carmel Aguilar  [address]  [place] [address]        Dear Colleague,    Thank you for referring your patient, Carmel Aguilar, to the [facility]. Please see a copy of my visit note below.    .  CHIEF COMPLAINT:   Diagnoses       Codes Comments    Dysfunction of Eustachian tube, unspecified laterality     H69.80            HISTORY OF PRESENT ILLNESS    Carmel was seen at the behest of Dayana Calle DO for hearing difficulty.  She was seen by a specialist in [place], [place] 1 year ago (Yany Overton, Bobbi).   She was working with Melissa Stone in August.  She did try hearing aids but had issues with ear pain on the right side. Patient also has a history of GERD and has thick stringy mucus that is present in the morning. She does grind her teeth at night. She denies does not have ear pain but complains of fullness in both ears in the morning. She does have a history of significant allergies to dust and mold. She is somewhat hesitant to take antihistamines because she is on trazodone which causes a fair amount of dryness as a side effect. She anticipates getting in this line the next several weeks.         REVIEW OF SYSTEMS    Review of Systems as per HPI and PMHx, otherwise 10 system review system are negative.     Melon, Mold, Morphine, Nsaids, Other environmental allergy, and Tomato     There were no vitals taken for this visit.    HEAD: Normal appearance and symmetry:  No cutaneous lesions.      NECK:  supple     EARS: normal TM, EACs     NOSE:     Dorsum:   straight  Septum:  left deviation  Mucosa:  moist  Inferior turbinates: wnl        ORAL CAVITY/OROPHARYNX:     Lips:  Normal.  Tongue: normal, midline  Mucosa:   no lesions  Tonsils:  1-2+     NECK:  Trachea:  midline.              Thyroid:  normal              Adenopathy:  none        NEURO:   Alert and Oriented     GAIT AND STATION:  normal     RESPIRATORY:   Symmetry and Respiratory  effort     PSYCH:  Normal mood and affect     SKIN:   warm and dry         IMPRESSION:    Encounter Diagnoses   Name Primary?     Dysfunction of Eustachian tube, unspecified laterality      Auditory processing disorder Yes      Recommend she follow-up with her audiologist Melissa Adams regarding the APD. Regarding the ear fullness it could be eustachian tube issues and so she agreed to a trial of Astelin nasal spray. This also may be related to grinding at night and I would recommend and I would be curious to see if this improves when she is fitted with invisible line. I do not we discussed briefly a physical therapy referral but she not having any ear pain so I will defer at this time. We will place  a referral to general surgery for GERD symptoms.    All questions were answered she is agreeable to plan of care    RECOMMENDATIONS:      Trial of astelin spray   Referral for GERD  Follow up with Melissa Adams for APD                Again, thank you for allowing me to participate in the care of your patient.        Sincerely,        Galo Cortes MD

## 2021-12-31 NOTE — PROGRESS NOTES
.  CHIEF COMPLAINT:   Diagnoses       Codes Comments    Dysfunction of Eustachian tube, unspecified laterality     H69.80            HISTORY OF PRESENT ILLNESS    Carmel was seen at the behest of Dayana Calle DO for hearing difficulty.  She was seen by a specialist in [place], [place] 1 year ago (Yany Overton, Bobbi).   She was working with Melissa Stone in August.  She did try hearing aids but had issues with ear pain on the right side. Patient also has a history of GERD and has thick stringy mucus that is present in the morning. She does grind her teeth at night. She denies does not have ear pain but complains of fullness in both ears in the morning. She does have a history of significant allergies to dust and mold. She is somewhat hesitant to take antihistamines because she is on trazodone which causes a fair amount of dryness as a side effect. She anticipates getting in this line the next several weeks.         REVIEW OF SYSTEMS    Review of Systems as per HPI and PMHx, otherwise 10 system review system are negative.     Melon, Mold, Morphine, Nsaids, Other environmental allergy, and Tomato     There were no vitals taken for this visit.    HEAD: Normal appearance and symmetry:  No cutaneous lesions.      NECK:  supple     EARS: normal TM, EACs     NOSE:     Dorsum:   straight  Septum:  left deviation  Mucosa:  moist  Inferior turbinates: wnl        ORAL CAVITY/OROPHARYNX:     Lips:  Normal.  Tongue: normal, midline  Mucosa:   no lesions  Tonsils:  1-2+     NECK:  Trachea:  midline.              Thyroid:  normal              Adenopathy:  none        NEURO:   Alert and Oriented     GAIT AND STATION:  normal     RESPIRATORY:   Symmetry and Respiratory effort     PSYCH:  Normal mood and affect     SKIN:   warm and dry         IMPRESSION:    Encounter Diagnoses   Name Primary?     Dysfunction of Eustachian tube, unspecified laterality      Auditory processing disorder Yes      Recommend she follow-up with her  audiologist Melissa Adams regarding the APD. Regarding the ear fullness it could be eustachian tube issues and so she agreed to a trial of Astelin nasal spray. This also may be related to grinding at night and I would recommend and I would be curious to see if this improves when she is fitted with invisible line. I do not we discussed briefly a physical therapy referral but she not having any ear pain so I will defer at this time. We will place  a referral to general surgery for GERD symptoms.    All questions were answered she is agreeable to plan of care    RECOMMENDATIONS:      Trial of astelin spray   Referral for GERD  Follow up with Melissa Adams for APD

## 2022-01-07 ENCOUNTER — LAB (OUTPATIENT)
Dept: LAB | Facility: CLINIC | Age: 52
End: 2022-01-07
Payer: COMMERCIAL

## 2022-01-07 DIAGNOSIS — E53.8 FOLATE DEFICIENCY: ICD-10-CM

## 2022-01-07 LAB — FOLATE SERPL-MCNC: 15 NG/ML

## 2022-01-07 PROCEDURE — 82746 ASSAY OF FOLIC ACID SERUM: CPT

## 2022-01-07 PROCEDURE — 36415 COLL VENOUS BLD VENIPUNCTURE: CPT

## 2022-01-13 NOTE — PATIENT INSTRUCTIONS
GERD education & surgery packet provided to pt.    [facility]in L[facility]  Surgery Clinic [facility] [place]  [facility] - [address] [address]  Office: [phone]  Fax: [phone]

## 2022-01-17 ENCOUNTER — OFFICE VISIT (OUTPATIENT)
Dept: SURGERY | Facility: CLINIC | Age: 52
End: 2022-01-17
Attending: OTOLARYNGOLOGY
Payer: COMMERCIAL

## 2022-01-17 DIAGNOSIS — K21.9 GASTROESOPHAGEAL REFLUX DISEASE WITHOUT ESOPHAGITIS: Primary | ICD-10-CM

## 2022-01-17 PROCEDURE — 99203 OFFICE O/P NEW LOW 30 MIN: CPT | Performed by: SURGERY

## 2022-01-17 NOTE — PROGRESS NOTES
HPI: Carmel Aguilar is a 51 year old female referred to see me by Dayana Calle for evaluation of gastroesophageal reflux disease.  She notes  a several month history of thickened oropharyngeal phlegm with difficulty hearing at times.  She has seen an audiologist in [place] in the past and was told she has intermittently blocked eustachian tubes.  She denies any obvious reflux symptoms such as esophageal burning, dysphagia, waterbrash or cough.  Her GERD HRQOL is 1    Allergies:Melon, Mold, Morphine, Nsaids, Other environmental allergy, and Tomato    Past Medical History:   Diagnosis Date     Auditory processing disorder 2021     Autonomic dysfunction 2021     Bruxism 2021     Female stress incontinence 2020     NAPOLEON (generalized anxiety disorder)      Hypoglycemia 2021     Hypothyroid      Hypothyroidism 2021     IBS (irritable bowel syndrome)      Insomnia 2021     Irritable bowel syndrome 2021     mild MICHAEL (obstructive sleep apnea)      Plantar fasciitis 2021     PVC's (premature ventricular contractions) 2021    Just noted on holter     PVD (posterior vitreous detachment), bilateral 2021     Rosacea 2021     Seasonal allergic rhinitis 2021     SVT (supraventricular tachycardia) (H)      Syncope 2021     Syringomyelia (H) 2021     Vasovagal reaction        Past Surgical History:   Procedure Laterality Date      SECTION       WISDOM TOOTH EXTRACTION         CURRENT MEDS:    Current Outpatient Medications:      azelaic acid (FINACIA) 15 % external gel, azelaic acid 15 % topical gel  APPLY TO AFFECTED AREA EVERY DAY, Disp: , Rfl:      hydrocortisone 2.5 % cream, hydrocortisone 2.5 % topical cream, Disp: , Rfl:      MAGNESIUM GLYCINATE PO, Take by mouth daily , Disp: , Rfl:      Omega-3 Fatty Acids (FISH OIL) 1200 MG capsule, Take 1,200 mg by mouth daily, Disp: , Rfl:      progesterone (PROMETRIUM) 100 MG capsule, Take 100 mg by  mouth daily Takes at dinner, Disp: , Rfl:      SYNTHROID 112 MCG tablet, Take 112 mcg by mouth every evening , Disp: , Rfl:      traZODone (DESYREL) 50 MG tablet, trazodone 50 mg tablet  TAKE 1/2 TO 1 TABLET BY MOUTH AT BEDTIME, Disp: , Rfl:      UNABLE TO FIND, MEDICATION NAME: Bi-estrogen (compound) BID, Disp: , Rfl:      UNABLE TO FIND, MEDICATION NAME: Zinc & Copper 15mg, Disp: , Rfl:      vitamin D3 (VITAMIN D3) 50 mcg (2000 units) tablet, Take 50 mcg by mouth daily 2000 units , Disp: , Rfl:     Family History   Problem Relation Age of Onset     Atrial fibrillation Father      Cerebrovascular Disease Father      Dementia Father      Ovarian Cancer Mother      Hypertension Mother      Sleep Apnea Brother      Myocardial Infarction Maternal Grandmother      Cancer Paternal Grandfather      Colon Cancer No family hx of      Breast Cancer No family hx of         reports that she has never smoked. She has never used smokeless tobacco. She reports previous alcohol use. She reports previous drug use.    Review of Systems:  The 12 system review is within normal limits except for as mentioned above.  General ROS: No complaints or constitutional symptoms  Ophthalmic ROS: No complaints of visual changes  Skin: No complaints or symptoms   Endocrine: No complaints or symptoms  Hematologic/Lymphatic: No symptoms or complaints  Psychiatric: No symptoms or complaints  Respiratory ROS: no cough, shortness of breath, or wheezing  Cardiovascular ROS: no chest pain or dyspnea on exertion  Gastrointestinal ROS: As per HPI  Genito-Urinary ROS: no dysuria, trouble voiding, or hematuria  Musculoskeletal ROS: no joint or muscle pain  Neurological ROS: no TIA or stroke symptoms      EXAM:  There were no vitals taken for this visit.  GENERAL: Well developed female, No acute distress, pleasant and conversant   EYES: Pupils equal, round and reactive, no scleral icterus  ABDOMEN: soft, non tender  SKIN: Pink, warm and dry, no obvious rashes  or lesions   NEURO:No focal deficits, ambulatory  MUSCULOSKELETAL:No obvious deformities, no swelling, normal appearing      LABS:  Lab Results   Component Value Date    WBC 4.8 10/13/2021    HGB 14.1 10/13/2021    HCT 43.0 10/13/2021    MCV 96 10/13/2021     10/13/2021     INR/Prothrombin Time  @LABRCNTIP(NA,K,CL,co2,bun,creatinine,labglom,glucose,calcium)@  Lab Results   Component Value Date    ALT 16 10/13/2021    AST 20 10/13/2021    ALKPHOS 70 10/13/2021         Assessment/Plan:   Carmel Aguilar is a 51 year old female with thickened phlegm with no other GERD related symptoms.  She may have silent reflux contributing to her symptom but the likelihood is fairly low.  As a first step we will obtain an esophagram for initial evaluation.  I will call her with the results once this is completed.       Canelo Hewitt,  FACS  [phone]  [facility] Department of Surgery

## 2022-01-17 NOTE — LETTER
2022         RE: Carmel Aguilar   [address]  [place] [address]        Dear Colleague,    Thank you for referring your patient, Carmel Aguilar, to the [facility] [place]. Please see a copy of my visit note below.    HPI: Carmel Aguilar is a 51 year old female referred to see me by Dayana Calle for evaluation of gastroesophageal reflux disease.  She notes  a several month history of thickened oropharyngeal phlegm with difficulty hearing at times.  She has seen an audiologist in [place] in the past and was told she has intermittently blocked eustachian tubes.  She denies any obvious reflux symptoms such as esophageal burning, dysphagia, waterbrash or cough.  Her GERD HRQOL is 1    Allergies:Melon, Mold, Morphine, Nsaids, Other environmental allergy, and Tomato    Past Medical History:   Diagnosis Date     Auditory processing disorder 2021     Autonomic dysfunction 2021     Bruxism 2021     Female stress incontinence 2020     NAPOLEON (generalized anxiety disorder)      Hypoglycemia 2021     Hypothyroid      Hypothyroidism 2021     IBS (irritable bowel syndrome)      Insomnia 2021     Irritable bowel syndrome 2021     mild MICHAEL (obstructive sleep apnea)      Plantar fasciitis 2021     PVC's (premature ventricular contractions) 2021    Just noted on holter     PVD (posterior vitreous detachment), bilateral 2021     Rosacea 2021     Seasonal allergic rhinitis 2021     SVT (supraventricular tachycardia) (H)      Syncope 2021     Syringomyelia (H) 2021     Vasovagal reaction        Past Surgical History:   Procedure Laterality Date      SECTION       WISDOM TOOTH EXTRACTION         CURRENT MEDS:    Current Outpatient Medications:      azelaic acid (FINACIA) 15 % external gel, azelaic acid 15 % topical gel  APPLY TO AFFECTED AREA EVERY DAY, Disp: , Rfl:      hydrocortisone  2.5 % cream, hydrocortisone 2.5 % topical cream, Disp: , Rfl:      MAGNESIUM GLYCINATE PO, Take by mouth daily , Disp: , Rfl:      Omega-3 Fatty Acids (FISH OIL) 1200 MG capsule, Take 1,200 mg by mouth daily, Disp: , Rfl:      progesterone (PROMETRIUM) 100 MG capsule, Take 100 mg by mouth daily Takes at dinner, Disp: , Rfl:      SYNTHROID 112 MCG tablet, Take 112 mcg by mouth every evening , Disp: , Rfl:      traZODone (DESYREL) 50 MG tablet, trazodone 50 mg tablet  TAKE 1/2 TO 1 TABLET BY MOUTH AT BEDTIME, Disp: , Rfl:      UNABLE TO FIND, MEDICATION NAME: Bi-estrogen (compound) BID, Disp: , Rfl:      UNABLE TO FIND, MEDICATION NAME: Zinc & Copper 15mg, Disp: , Rfl:      vitamin D3 (VITAMIN D3) 50 mcg (2000 units) tablet, Take 50 mcg by mouth daily 2000 units , Disp: , Rfl:     Family History   Problem Relation Age of Onset     Atrial fibrillation Father      Cerebrovascular Disease Father      Dementia Father      Ovarian Cancer Mother      Hypertension Mother      Sleep Apnea Brother      Myocardial Infarction Maternal Grandmother      Cancer Paternal Grandfather      Colon Cancer No family hx of      Breast Cancer No family hx of         reports that she has never smoked. She has never used smokeless tobacco. She reports previous alcohol use. She reports previous drug use.    Review of Systems:  The 12 system review is within normal limits except for as mentioned above.  General ROS: No complaints or constitutional symptoms  Ophthalmic ROS: No complaints of visual changes  Skin: No complaints or symptoms   Endocrine: No complaints or symptoms  Hematologic/Lymphatic: No symptoms or complaints  Psychiatric: No symptoms or complaints  Respiratory ROS: no cough, shortness of breath, or wheezing  Cardiovascular ROS: no chest pain or dyspnea on exertion  Gastrointestinal ROS: As per HPI  Genito-Urinary ROS: no dysuria, trouble voiding, or hematuria  Musculoskeletal ROS: no joint or muscle pain  Neurological ROS: no  TIA or stroke symptoms      EXAM:  There were no vitals taken for this visit.  GENERAL: Well developed female, No acute distress, pleasant and conversant   EYES: Pupils equal, round and reactive, no scleral icterus  ABDOMEN: soft, non tender  SKIN: Pink, warm and dry, no obvious rashes or lesions   NEURO:No focal deficits, ambulatory  MUSCULOSKELETAL:No obvious deformities, no swelling, normal appearing      LABS:  Lab Results   Component Value Date    WBC 4.8 10/13/2021    HGB 14.1 10/13/2021    HCT 43.0 10/13/2021    MCV 96 10/13/2021     10/13/2021     INR/Prothrombin Time  @LABRCNTIP(NA,K,CL,co2,bun,creatinine,labglom,glucose,calcium)@  Lab Results   Component Value Date    ALT 16 10/13/2021    AST 20 10/13/2021    ALKPHOS 70 10/13/2021         Assessment/Plan:   Carmel Aguilar is a 51 year old female with thickened phlegm with no other GERD related symptoms.  She may have silent reflux contributing to her symptom but the likelihood is fairly low.  As a first step we will obtain an esophagram for initial evaluation.  I will call her with the results once this is completed.       Canelo Hewitt DO FACS  [phone]  [facility] Department of Surgery      Again, thank you for allowing me to participate in the care of your patient.        Sincerely,        Canelo Hewitt DO

## 2022-01-26 ENCOUNTER — NURSE TRIAGE (OUTPATIENT)
Dept: NURSING | Facility: CLINIC | Age: 52
End: 2022-01-26
Payer: COMMERCIAL

## 2022-01-26 ENCOUNTER — TELEPHONE (OUTPATIENT)
Dept: SURGERY | Facility: CLINIC | Age: 52
End: 2022-01-26
Payer: COMMERCIAL

## 2022-01-26 NOTE — TELEPHONE ENCOUNTER
Patient is having a esophagram on Monday and she said  told her it will affect her GI system and she wants to know for how long so if she needs to cancel things.    Please call and advise.    Thanks!

## 2022-01-26 NOTE — TELEPHONE ENCOUNTER
S-(situation): Call from patient with questions about her upcoming esophagram. Patient wanted to know about after care and if she needed to stay home that day      B-(background): Visit w/ Dr. Hewitt who ordered the esophagram      A-(assessment): surgery team would be best to answer her questions      R-(recommendations): Informed patient will transfer her to surgery department.  Caller verbalized understanding.  Transferred to [phone].        Jayden Rocha RN/[facility]    Additional Information    Information only question and nurse able to answer    Negative: Nursing judgment    Negative: Nursing judgment    Negative: Nursing judgment    Negative: Nursing judgment    Protocols used: INFORMATION ONLY CALL - NO TRIAGE-A-[place]

## 2022-01-26 NOTE — TELEPHONE ENCOUNTER
Returned call to pt to discuss side effects of esophagram. Informed her that I have heard from pt's that it can cause loose stools/diarrhea with cramping or constipation. However, increasing fluid intake can help eliminate the barium from her GI system faster. She can also use Miralax if she experiences any difficulty having a bowel movement. Pt verbalized understanding and had no other questions or concerns.

## 2022-01-31 ENCOUNTER — HOSPITAL ENCOUNTER (OUTPATIENT)
Dept: RADIOLOGY | Facility: HOSPITAL | Age: 52
Discharge: HOME | End: 2022-01-31
Attending: SURGERY | Admitting: SURGERY
Payer: COMMERCIAL

## 2022-01-31 ENCOUNTER — NURSE TRIAGE (OUTPATIENT)
Dept: NURSING | Facility: CLINIC | Age: 52
End: 2022-01-31

## 2022-01-31 PROCEDURE — 74220 X-RAY XM ESOPHAGUS 1CNTRST: CPT

## 2022-01-31 RX ADMIN — DIATRIZOATE MEGLUMINE AND DIATRIZOATE SODIUM 120 ML: 660; 100 SOLUTION ORAL; RECTAL at 08:25

## 2022-01-31 NOTE — TELEPHONE ENCOUNTER
Has had diarrhea 3 times after having Esophagram today.    Patient states she went home after having test today, and ate cereal and milk, and also had some broth.  She then had 3 diarrhea stools, and then took a nap. Now she states she is having some tingly feeling in her arms, and a little lightheadedness.    Patient was advised to eat a BRAT diet, applesauce, and bananas , and eith 7 up or Ginger ale.  Patient  Agreed to call 911 for a welfare check if she continues to feel sick or weak and lightheaded. She states she has no one to help her.    FYI. Post procedure today.    Layla Baldwin RN ,  [facility] Triage/refill nurse    Reason for Disposition    [1] Follow-up call from patient regarding patient's clinical status AND [2] information urgent    Additional Information    Negative: Lab calling with strep throat test results and triager can call in prescription    Negative: Lab calling with urinalysis test results and triager can call in prescription    Negative: Medication questions    Negative: [1] Prescription not at pharmacy AND [2] was prescribed today by PCP    Negative: Lab or radiology calling with CRITICAL test results    Negative: Call about patient who is currently hospitalized    Negative: Physician call to PCP    Negative: ED call to PCP    Protocols used: PCP CALL - NO TRIAGE-A-

## 2022-02-04 ENCOUNTER — TELEPHONE (OUTPATIENT)
Dept: SURGERY | Facility: CLINIC | Age: 52
End: 2022-02-04
Payer: COMMERCIAL

## 2022-02-04 NOTE — TELEPHONE ENCOUNTER
I called the patient to discuss the results of the esophagram.  Unfortunately she was not home but I did leave a message for her to call back.    Pravin Hewitt DO [facility] Surgery  [phone]

## 2022-02-11 ENCOUNTER — DOCUMENTATION ONLY (OUTPATIENT)
Dept: SLEEP MEDICINE | Facility: CLINIC | Age: 52
End: 2022-02-11
Payer: COMMERCIAL

## 2022-02-15 ENCOUNTER — DOCUMENTATION ONLY (OUTPATIENT)
Dept: SLEEP MEDICINE | Facility: CLINIC | Age: 52
End: 2022-02-15
Payer: COMMERCIAL

## 2022-02-22 ENCOUNTER — APPOINTMENT (OUTPATIENT)
Dept: SLEEP MEDICINE | Facility: CLINIC | Age: 52
End: 2022-02-22
Payer: COMMERCIAL

## 2022-02-24 ENCOUNTER — OFFICE VISIT (OUTPATIENT)
Dept: CARDIOLOGY | Facility: CLINIC | Age: 52
End: 2022-02-24
Payer: COMMERCIAL

## 2022-02-24 VITALS
SYSTOLIC BLOOD PRESSURE: 100 MMHG | HEIGHT: 68 IN | HEART RATE: 76 BPM | WEIGHT: 141 LBS | RESPIRATION RATE: 16 BRPM | DIASTOLIC BLOOD PRESSURE: 64 MMHG | BODY MASS INDEX: 21.37 KG/M2

## 2022-02-24 DIAGNOSIS — I47.10 PAROXYSMAL SUPRAVENTRICULAR TACHYCARDIA (H): Primary | ICD-10-CM

## 2022-02-24 PROCEDURE — 99213 OFFICE O/P EST LOW 20 MIN: CPT | Performed by: INTERNAL MEDICINE

## 2022-02-24 NOTE — PATIENT INSTRUCTIONS
It was a pleasure seeing you at [facility] today.        Here are my suggestions for your care:    1.  Exercise daily    2.  Eat a healthy diet    3.  Let us know if your SVT is acting up      Let's meet again in 12 months.    You can always call my nurse Terese Flores RN who is a nurse helping me in the care of my patients. She can be reached at [phone] if you have any questions.    For scheduling, please call my  Nat Cano at [phone].    Thank you again for trusting me with your care. Please feel free to call my office at any time if you have any question or if I can assist you in any way.    Arti Escobar MD  [facility]

## 2022-02-24 NOTE — PROGRESS NOTES
"  HEART CARE ENCOUNTER CONSULTATON NOTE      [facility]  [phone]      Assessment/Recommendations   Assessment/Plan:  Paroxysmal SVT - respond to valsalva. She weaned herself off of propranolol and does not feel that her SVT is very bothersome. Will continue to monitor for now.     Risk modification - regular cardiovascular exercise advised    Orthostasis - hydration encouraged    F/U 12 months       History of Present Illness/Subjective    HPI: Carmel Aguilar is a 51 year old female with NAPOLEON, mild MICHAEL, and a history of vasovagal reactions without syncope. Around 2019 she started seeing a cardiologist in [place], Dr. Grant Soto ([phone]). She initially went to see him after an EKG showed an abnormal interval, per her recall. A holter showed SVT and she she had a normal echocardiogram. Carmel had been on propranolol for her anxiety and palpitations but was weaned off as it was making her feel very dry and dehydrated.    Carmel returns for 6 month follow up. She has been dealing with stress/anxiety and has not been exercising. Her SVT episodes are brief and respond to valsalva predictably. She notes no dizziness or other symptoms when they occur. She denies any angina pain or significant dyspnea. There has been no syncope, pnd/orhtopnea, or edema.         Physical Examination  Review of Systems   Vitals: /64 (BP Location: Left arm, Patient Position: Sitting, Cuff Size: Adult Regular)   Pulse 76   Resp 16   Ht 1.727 m (5' 8\")   Wt 64 kg (141 lb)   BMI 21.44 kg/m    BMI= Body mass index is 21.44 kg/m .  Wt Readings from Last 3 Encounters:   02/24/22 64 kg (141 lb)   12/28/21 68 kg (150 lb)   11/01/21 65.8 kg (145 lb)       General Appearance:   no distress, normal body habitus   ENT/Mouth: membranes moist, no oral lesions or bleeding gums.      EYES:  no scleral icterus, normal conjunctivae   Neck: no carotid bruits or thyromegaly   Chest/Lungs:   lungs are clear to " auscultation, no rales or wheezing, no sternal scar, equal chest wall expansion    Cardiovascular:   Regular. Normal first and second heart sounds with no murmur. No rubs or gallops; the right carotid, radial and posterior tibial pulses are intact and the left carotid, radial and posterior tibial pulses are intact.  Jugular venous pressure is flat, no edema bilaterally    Abdomen:  no organomegaly, masses, bruits, or tenderness; bowel sounds are present   Extremities: no cyanosis or clubbing   Skin: no xanthelasma, warm.    Neurologic: normal  bilateral, no tremors     Psychiatric: alert and oriented x3, calm        Please refer above for cardiac ROS details.        Medical History  Surgical History Family History Social History   Past Medical History:   Diagnosis Date     Auditory processing disorder 2021     Autonomic dysfunction 2021     Bruxism 2021     Female stress incontinence 2020     NAPOLEON (generalized anxiety disorder)      Hypoglycemia 2021     Hypothyroid      Hypothyroidism 2021     IBS (irritable bowel syndrome)      Insomnia 2021     Irritable bowel syndrome 2021     mild MICHAEL (obstructive sleep apnea)      Plantar fasciitis 2021     PVC's (premature ventricular contractions) 2021    Just noted on holter     PVD (posterior vitreous detachment), bilateral 2021     Rosacea 2021     Seasonal allergic rhinitis 2021     SVT (supraventricular tachycardia) (H)      Syncope 2021     Syringomyelia (H) 2021     Vasovagal reaction      Past Surgical History:   Procedure Laterality Date      SECTION       WISDOM TOOTH EXTRACTION       Family History   Problem Relation Age of Onset     Atrial fibrillation Father      Cerebrovascular Disease Father      Dementia Father      Ovarian Cancer Mother      Hypertension Mother      Sleep Apnea Brother      Myocardial Infarction Maternal Grandmother      Cancer Paternal Grandfather       Colon Cancer No family hx of      Breast Cancer No family hx of         Social History     Socioeconomic History     Marital status:      Spouse name: Not on file     Number of children: Not on file     Years of education: Not on file     Highest education level: Not on file   Occupational History     Occupation: homemaker     Occupation: student:    Tobacco Use     Smoking status: Never Smoker     Smokeless tobacco: Never Used   Substance and Sexual Activity     Alcohol use: Not Currently     Drug use: Not Currently     Sexual activity: Not Currently   Other Topics Concern     Parent/sibling w/ CABG, MI or angioplasty before 65F 55M? Not Asked   Social History Narrative     Not on file     Social Determinants of Health     Financial Resource Strain: Not on file   Food Insecurity: Not on file   Transportation Needs: Not on file   Physical Activity: Not on file   Stress: Not on file   Social Connections: Not on file   Intimate Partner Violence: Not on file   Housing Stability: Not on file           Medications  Allergies   Current Outpatient Medications   Medication Sig Dispense Refill     azelaic acid (FINACIA) 15 % external gel azelaic acid 15 % topical gel   APPLY TO AFFECTED AREA EVERY DAY       hydrocortisone 2.5 % cream hydrocortisone 2.5 % topical cream       MAGNESIUM GLYCINATE PO Take by mouth daily        Omega-3 Fatty Acids (FISH OIL) 1200 MG capsule Take 1,200 mg by mouth daily       progesterone (PROMETRIUM) 100 MG capsule Take 100 mg by mouth daily Takes at dinner       SYNTHROID 112 MCG tablet Take 112 mcg by mouth every evening        traZODone (DESYREL) 50 MG tablet trazodone 50 mg tablet   TAKE 1/2 TO 1 TABLET BY MOUTH AT BEDTIME       UNABLE TO FIND MEDICATION NAME: Bi-estrogen (compound) BID       UNABLE TO FIND MEDICATION NAME: Zinc & Copper 15mg       vitamin D3 (VITAMIN D3) 50 mcg (2000 units) tablet Take 50 mcg by mouth daily 2000 units          Allergies   Allergen  Reactions     Melon      Oral reaction      Mold      Morphine Nausea and Vomiting     Nsaids Hives     Other Environmental Allergy      Tomato Hives          Lab Results    Chemistry/lipid CBC Cardiac Enzymes/BNP/TSH/INR   Recent Labs   Lab Test 10/13/21  0957   CHOL 199   HDL 57      TRIG 107     Recent Labs   Lab Test 10/13/21  0957        Recent Labs   Lab Test 10/13/21  0957      POTASSIUM 4.1   CHLORIDE 106   CO2 27   GLC 91   BUN 13   CR 0.78   GFRESTIMATED 88   ERICH 9.8     Recent Labs   Lab Test 10/13/21  0957   CR 0.78     No results for input(s): A1C in the last 03118 hours.       Recent Labs   Lab Test 10/13/21  0957   WBC 4.8   HGB 14.1   HCT 43.0   MCV 96        Recent Labs   Lab Test 10/13/21  0957   HGB 14.1    No results for input(s): TROPONINI in the last 13538 hours.  No results for input(s): BNP, NTBNPI, NTBNP in the last 92693 hours.  Recent Labs   Lab Test 10/13/21  0957   TSH 1.31     No results for input(s): INR in the last 44687 hours.     Today's clinic visit entailed:  Review of prior external note(s) from - [facility] information from epic reviewed  30 minutes spent on the date of the encounter doing chart review, review of outside records, review of test results, interpretation of tests, patient visit and documentation   Provider  Link to [facility] Help Grid     The level of medical decision making during this visit was of moderate complexity.        Arti Escobar MD

## 2022-02-24 NOTE — LETTER
"2/24/2022    Dayana Calle, DO  [address] [facility] [address]    RE: Carmel Aguilar       Dear Colleague,     I had the pleasure of seeing Carmel Aguilar in the [facility].    HEART CARE ENCOUNTER CONSULTATON NOTE      [facility]  [phone]      Assessment/Recommendations   Assessment/Plan:  Paroxysmal SVT - respond to valsalva. She weaned herself off of propranolol and does not feel that her SVT is very bothersome. Will continue to monitor for now.     Risk modification - regular cardiovascular exercise advised    Orthostasis - hydration encouraged    F/U 12 months       History of Present Illness/Subjective    HPI: Carmel Aguilar is a 51 year old female with NAPOLEON, mild MICHAEL, and a history of vasovagal reactions without syncope. Around 2019 she started seeing a cardiologist in [place], Dr. Grant Soto ([phone]). She initially went to see him after an EKG showed an abnormal interval, per her recall. A holter showed SVT and she she had a normal echocardiogram. Carmel had been on propranolol for her anxiety and palpitations but was weaned off as it was making her feel very dry and dehydrated.    Carmel returns for 6 month follow up. She has been dealing with stress/anxiety and has not been exercising. Her SVT episodes are brief and respond to valsalva predictably. She notes no dizziness or other symptoms when they occur. She denies any angina pain or significant dyspnea. There has been no syncope, pnd/orhtopnea, or edema.         Physical Examination  Review of Systems   Vitals: /64 (BP Location: Left arm, Patient Position: Sitting, Cuff Size: Adult Regular)   Pulse 76   Resp 16   Ht 1.727 m (5' 8\")   Wt 64 kg (141 lb)   BMI 21.44 kg/m    BMI= Body mass index is 21.44 kg/m .  Wt Readings from Last 3 Encounters:   02/24/22 64 kg (141 lb)   12/28/21 68 kg (150 lb)   11/01/21 65.8 kg (145 lb)       General Appearance:   no distress, normal body " habitus   ENT/Mouth: membranes moist, no oral lesions or bleeding gums.      EYES:  no scleral icterus, normal conjunctivae   Neck: no carotid bruits or thyromegaly   Chest/Lungs:   lungs are clear to auscultation, no rales or wheezing, no sternal scar, equal chest wall expansion    Cardiovascular:   Regular. Normal first and second heart sounds with no murmur. No rubs or gallops; the right carotid, radial and posterior tibial pulses are intact and the left carotid, radial and posterior tibial pulses are intact.  Jugular venous pressure is flat, no edema bilaterally    Abdomen:  no organomegaly, masses, bruits, or tenderness; bowel sounds are present   Extremities: no cyanosis or clubbing   Skin: no xanthelasma, warm.    Neurologic: normal  bilateral, no tremors     Psychiatric: alert and oriented x3, calm        Please refer above for cardiac ROS details.        Medical History  Surgical History Family History Social History   Past Medical History:   Diagnosis Date     Auditory processing disorder 2021     Autonomic dysfunction 2021     Bruxism 2021     Female stress incontinence 2020     NAPOLEON (generalized anxiety disorder)      Hypoglycemia 2021     Hypothyroid      Hypothyroidism 2021     IBS (irritable bowel syndrome)      Insomnia 2021     Irritable bowel syndrome 2021     mild MICHAEL (obstructive sleep apnea)      Plantar fasciitis 2021     PVC's (premature ventricular contractions) 2021    Just noted on holter     PVD (posterior vitreous detachment), bilateral 2021     Rosacea 2021     Seasonal allergic rhinitis 2021     SVT (supraventricular tachycardia) (H)      Syncope 2021     Syringomyelia (H) 2021     Vasovagal reaction      Past Surgical History:   Procedure Laterality Date      SECTION       WISDOM TOOTH EXTRACTION       Family History   Problem Relation Age of Onset     Atrial fibrillation Father      Cerebrovascular  Disease Father      Dementia Father      Ovarian Cancer Mother      Hypertension Mother      Sleep Apnea Brother      Myocardial Infarction Maternal Grandmother      Cancer Paternal Grandfather      Colon Cancer No family hx of      Breast Cancer No family hx of         Social History     Socioeconomic History     Marital status:      Spouse name: Not on file     Number of children: Not on file     Years of education: Not on file     Highest education level: Not on file   Occupational History     Occupation: homemaker     Occupation: student:    Tobacco Use     Smoking status: Never Smoker     Smokeless tobacco: Never Used   Substance and Sexual Activity     Alcohol use: Not Currently     Drug use: Not Currently     Sexual activity: Not Currently   Other Topics Concern     Parent/sibling w/ CABG, MI or angioplasty before 65F 55M? Not Asked   Social History Narrative     Not on file     Social Determinants of Health     Financial Resource Strain: Not on file   Food Insecurity: Not on file   Transportation Needs: Not on file   Physical Activity: Not on file   Stress: Not on file   Social Connections: Not on file   Intimate Partner Violence: Not on file   Housing Stability: Not on file           Medications  Allergies   Current Outpatient Medications   Medication Sig Dispense Refill     azelaic acid (FINACIA) 15 % external gel azelaic acid 15 % topical gel   APPLY TO AFFECTED AREA EVERY DAY       hydrocortisone 2.5 % cream hydrocortisone 2.5 % topical cream       MAGNESIUM GLYCINATE PO Take by mouth daily        Omega-3 Fatty Acids (FISH OIL) 1200 MG capsule Take 1,200 mg by mouth daily       progesterone (PROMETRIUM) 100 MG capsule Take 100 mg by mouth daily Takes at dinner       SYNTHROID 112 MCG tablet Take 112 mcg by mouth every evening        traZODone (DESYREL) 50 MG tablet trazodone 50 mg tablet   TAKE 1/2 TO 1 TABLET BY MOUTH AT BEDTIME       UNABLE TO FIND MEDICATION NAME: Bi-estrogen  (compound) BID       UNABLE TO FIND MEDICATION NAME: Zinc & Copper 15mg       vitamin D3 (VITAMIN D3) 50 mcg (2000 units) tablet Take 50 mcg by mouth daily 2000 units          Allergies   Allergen Reactions     Melon      Oral reaction      Mold      Morphine Nausea and Vomiting     Nsaids Hives     Other Environmental Allergy      Tomato Hives          Lab Results    Chemistry/lipid CBC Cardiac Enzymes/BNP/TSH/INR   Recent Labs   Lab Test 10/13/21  0957   CHOL 199   HDL 57      TRIG 107     Recent Labs   Lab Test 10/13/21  0957        Recent Labs   Lab Test 10/13/21  0957      POTASSIUM 4.1   CHLORIDE 106   CO2 27   GLC 91   BUN 13   CR 0.78   GFRESTIMATED 88   ERICH 9.8     Recent Labs   Lab Test 10/13/21  0957   CR 0.78     No results for input(s): A1C in the last 10396 hours.       Recent Labs   Lab Test 10/13/21  0957   WBC 4.8   HGB 14.1   HCT 43.0   MCV 96        Recent Labs   Lab Test 10/13/21  0957   HGB 14.1    No results for input(s): TROPONINI in the last 89384 hours.  No results for input(s): BNP, NTBNPI, NTBNP in the last 75900 hours.  Recent Labs   Lab Test 10/13/21  0957   TSH 1.31     No results for input(s): INR in the last 17999 hours.     Today's clinic visit entailed:  Review of prior external note(s) from [facility] information from epic reviewed  30 minutes spent on the date of the encounter doing chart review, review of outside records, review of test results, interpretation of tests, patient visit and documentation   Provider  Link to [facility] Help Grid     The level of medical decision making during this visit was of moderate complexity.        Arti Escobar MD                      Thank you for allowing me to participate in the care of your patient.      Sincerely,     Arti Escobar MD     [facility]  cc:   No referring provider defined for this encounter.

## 2022-02-26 ENCOUNTER — NURSE TRIAGE (OUTPATIENT)
Dept: NURSING | Facility: CLINIC | Age: 52
End: 2022-02-26
Payer: COMMERCIAL

## 2022-02-26 NOTE — TELEPHONE ENCOUNTER
"Symptoms; Patient describes. Brain Fog  Drained feeling, and low energy  Clear drainage from sinus's  No cough  Always has a dry spot in the back  Of throat.    Patient reports she is suppose to travel next week to visit friends, and is afraid to go if she has COVID . anthony t advised to be tested for COVID.    Call sent to  for COVID testing .    Layla Baldwin RN   [facility] Nurse Advisor  COVID 19 Nurse Triage Plan/Patient Instructions    Please be aware that novel coronavirus (COVID-19) may be circulating in the community. If you develop symptoms such as fever, cough, or SOB or if you have concerns about the presence of another infection including coronavirus (COVID-19), please contact your health care provider or visit [email or web address].     Disposition/Instructions    Home care recommended. Follow home care protocol based instructions.  Additional COVID19 information to add for patients.   How can I protect others?  If you have symptoms (fever, cough, body aches or trouble breathing): Stay home and away from others (self-isolate) until:    At least 10 days have passed since your symptoms started, And     You ve had no fever--and no medicine that reduces fever--for 1 full day (24 hours), And      Your other symptoms have resolved (gotten better).     If you don t have symptoms, but a test showed that you have COVID-19 (you tested positive):    Stay home and away from others (self-isolate). Follow the tips under \"How do I self-isolate?\" below for 10 days (20 days if you have a weak immune system).    You don't need to be retested for COVID-19 before going back to school or work. As long as you're fever-free and feeling better, you can go back to school, work and other activities after waiting the 10 or 20 days.     How do I self-isolate?    Stay in your own room, even for meals. Use your own bathroom if you can.     Stay away from others in your home. No hugging, kissing or shaking " hands. No visitors.    Don t go to work, school or anywhere else.     Clean  high touch  surfaces often (doorknobs, counters, handles, etc.). Use a household cleaning spray or wipes. You ll find a full list on the EPA website:  www.epa.gov/pesticide-registration/list-n-disinfectants-use-against-sars-cov-2.    Cover your mouth and nose with a mask, tissue or washcloth to avoid spreading germs.    Wash your hands and face often. Use soap and water.    Caregivers in these groups are at risk for severe illness due to COVID-19:  o People 65 years and older  o People who live in a nursing home or long-term care facility  o People with chronic disease (lung, heart, cancer, diabetes, kidney, liver, immunologic)  o People who have a weakened immune system, including those who:  - Are in cancer treatment  - Take medicine that weakens the immune system, such as corticosteroids  - Had a bone marrow or organ transplant  - Have an immune deficiency  - Have poorly controlled HIV or AIDS  - Are obese (body mass index of 40 or higher)  - Smoke regularly    Caregivers should wear gloves while washing dishes, handling laundry and cleaning bedrooms and bathrooms.    Use caution when washing and drying laundry: Don t shake dirty laundry, and use the warmest water setting that you can.    For more tips, go to www.cdc.gov/coronavirus/2019-ncov/downloads/10Things.pdf.    How can I take care of myself?  1. Get lots of rest. Drink extra fluids (unless a doctor has told you not to).     2. Take Tylenol (acetaminophen) for fever or pain. If you have liver or kidney problems, ask your family doctor if it s okay to take Tylenol.     Adults can take either:     650 mg (two 325 mg pills) every 4 to 6 hours, or     1,000 mg (two 500 mg pills) every 8 hours as needed.     Note: Don t take more than 3,000 mg in one day.   Acetaminophen is found in many medicines (both prescribed and over-the-counter medicines). Read all labels to be sure you don t  take too much.     For children, check the Tylenol bottle for the right dose. The dose is based on the child s age or weight.    3. If you have other health problems (like cancer, heart failure, an organ transplant or severe kidney disease): Call your specialty clinic if you don t feel better in the next 2 days.    4. Know when to call 911: Emergency warning signs include:    Trouble breathing or shortness of breath    Pain or pressure in the chest that doesn t go away    Feeling confused like you haven t felt before, or not being able to wake up    Bluish-colored lips or face    What are the symptoms of COVID-19?     The most common symptoms are cough, fever and trouble breathing.     Less common symptoms include body aches, chills, diarrhea (loose, watery poops), fatigue (feeling very tired), headache, runny nose, sore throat and loss of smell.    COVID-19 can cause severe coughing (bronchitis) and lung infection (pneumonia).    How does it spread?     The virus may spread when a person coughs or sneezes into the air. The virus can travel about 6 feet this way, and it can live on surfaces.      Common  (household disinfectants) will kill the virus.    Who is at risk?  Anyone can catch COVID-19 if they re around someone who has the virus.    How can others protect themselves?     Stay away from people who have COVID-19 (or symptoms of COVID-19).    Wash hands often with soap and water. Or, use hand  with at least 60% alcohol.    Avoid touching the eyes, nose or mouth.     Wear a face mask when you go out in public, when sick or when caring for a sick person.    Where can I get more information?     [employer]: About COVID-19: [email or web address]    CDC: What to Do If You re Sick: www.cdc.gov/coronavirus/2019-ncov/about/steps-when-sick.html    CDC: Ending Home Isolation: www.cdc.gov/coronavirus/2019-ncov/hcp/disposition-in-home-patients.html     CDC: Caring for Someone:  www.cdc.gov/coronavirus/2019-ncov/if-you-are-sick/care-for-someone.html     [facility]: Interim Guidance for Hospital Discharge to Home: www.[facility]/diseases/coronavirus/hcp/hospdischarge.pdf    [facility] clinical trials (COVID-19 research studies): clinicalaffairs.[facility]-clinical-trials     Below are the COVID-19 hotlines at the [place] Department [facility] ([facility]). Interpreters are available.   o For health questions: Call 669-112-6626 or 1-254.724.7972 (7 a.m. to 7 p.m.)  o For questions about schools and childcare: Call 324-353-3971 or 1-360.900.5732 (7 a.m. to 7 p.m.)          Thank you for taking steps to prevent the spread of this virus.  o Limit your contact with others.  o Wear a simple mask to cover your cough.  o Wash your hands well and often.    Resources    [facility]: About COVID-19: [email or web address]    CDC: What to Do If You're Sick: www.cdc.gov/coronavirus/2019-ncov/about/steps-when-sick.html    CDC: Ending Home Isolation: www.cdc.gov/coronavirus/2019-ncov/hcp/disposition-in-home-patients.html     CDC: Caring for Someone: www.cdc.gov/coronavirus/2019-ncov/if-you-are-sick/care-for-someone.html     [facility]: Interim Guidance for Hospital Discharge to Home: www.[facility]./diseases/coronavirus/hcp/hospdischarge.pdf    [facility] clinical trials (COVID-19 research studies): clinicalaffairs.[facility]-clinical-trials     Below are the COVID-19 hotlines at the [place] [facility] ([facility]). Interpreters are available.   o For health questions: Call 819-677-4441 or 1-905.231.3601 (7 a.m. to 7 p.m.)  o For questions about schools and childcare: Call 094-958-1797 or 1-633.276.9681 (7 a.m. to 7 p.m.)

## 2022-02-26 NOTE — TELEPHONE ENCOUNTER
Reason for Disposition    COVID-19 Testing, questions about    COVID-19 Prevention and Healthy Living, questions about    Additional Information    Negative: COVID-19 Home Isolation, questions about    Negative: [1] COVID-19 diagnosed AND [2] has mild nausea, vomiting or diarrhea    Negative: [1] COVID-19 diagnosed by doctor (or NP/PA) AND [2] mild symptoms (e.g., cough, fever, others) AND [3] no complications or SOB    Negative: [1] COVID-19 diagnosed by positive lab test AND [2] mild symptoms (e.g., cough, fever, others) AND [3] no complications or SOB    Negative: [1] COVID-19 diagnosed by positive lab test AND [2] NO symptoms (e.g., cough, fever, others)    Negative: Cough present > 3 weeks    Negative: [1] COVID-19 infection suspected by caller or triager AND [2] mild symptoms (cough, fever, or others) AND [3] negative COVID-19 rapid test    Negative: Fever present > 3 days (72 hours)    Negative: [1] Fever returns after gone for over 24 hours AND [2] symptoms worse or not improved    Negative: [1] Continuous (nonstop) coughing interferes with work or school AND [2] no improvement using cough treatment per protocol    Negative: HIGH RISK for severe COVID complications (e.g., age > 64 years, obesity with BMI > 25, pregnant, chronic lung disease or other chronic medical condition)  (Exception: Already seen by PCP and no new or worsening symptoms.)    Negative: [1] HIGH RISK patient AND [2] influenza is widespread in the community AND [3] ONE OR MORE respiratory symptoms: cough, sore throat, runny or stuffy nose    Negative: [1] HIGH RISK patient AND [2] influenza exposure within the last 7 days AND [3] ONE OR MORE respiratory symptoms: cough, sore throat, runny or stuffy nose    Protocols used: CORONAVIRUS (COVID-19) DIAGNOSED OR [id number]-V-PO 8.25.2021

## 2022-02-28 ENCOUNTER — VIRTUAL VISIT (OUTPATIENT)
Dept: SURGERY | Facility: CLINIC | Age: 52
End: 2022-02-28
Payer: COMMERCIAL

## 2022-02-28 ENCOUNTER — HOSPITAL ENCOUNTER (OUTPATIENT)
Dept: PHYSICAL THERAPY | Facility: REHABILITATION | Age: 52
End: 2022-02-28
Payer: COMMERCIAL

## 2022-02-28 DIAGNOSIS — M95.8 BILATERAL WINGED SCAPULA: ICD-10-CM

## 2022-02-28 DIAGNOSIS — K21.9 GASTROESOPHAGEAL REFLUX DISEASE WITHOUT ESOPHAGITIS: Primary | ICD-10-CM

## 2022-02-28 DIAGNOSIS — G95.0 SYRINGOMYELIA (H): Primary | ICD-10-CM

## 2022-02-28 DIAGNOSIS — R29.3 POOR POSTURE: ICD-10-CM

## 2022-02-28 PROCEDURE — 97140 MANUAL THERAPY 1/> REGIONS: CPT | Mod: GP | Performed by: PHYSICAL THERAPIST

## 2022-02-28 PROCEDURE — 99212 OFFICE O/P EST SF 10 MIN: CPT | Mod: TEL | Performed by: SURGERY

## 2022-02-28 NOTE — LETTER
"    2/28/2022         RE: Carmel Fischer  [address]  [place] [address]        Dear Colleague,    Thank you for referring your patient, Carmel Fischer, to the [facility] [place]. Please see a copy of my visit note below.      The patient has been notified of following:     \"This telephone visit will be conducted via a call between you and your physician/provider. We have found that certain health care needs can be provided without the need for a physical exam.  This service lets us provide the care you need with a short phone conversation.  If a prescription is necessary we can send it directly to your pharmacy.  If lab work is needed we can place an order for that and you can then stop by our lab to have the test done at a later time.    Telephone visits are billed at different rates depending on your insurance coverage. During this emergency period, for some insurers they may be billed the same as an in-person visit.  Please reach out to your insurance provider with any questions.    If during the course of the call the physician/provider feels a telephone visit is not appropriate, you will not be charged for this service.\"    Patient has given verbal consent to a Telephone visit? Yes    What phone number would you like to be contacted at? [phone]    Patient would like to receive their AVS by [employer]    Additional provider notes:  I spoke with the patient today to discuss the findings of the esophagram.  She does have what appears to be a Schatzki's ring and a small hiatal hernia.  We discussed the next step to include an endoscopy.  However she has a colonoscopy planned in the next year and would prefer to hold off on any new endoscopic procedures for the time being.  She will schedule an EGD at the time she has her colonoscopy with her gastroenterologist.  She will follow-up with me on a as needed basis if she would like me to address her esophageal " pathology and reflux.    Phone call duration: 10 minutes    Pravin Hewitt DO [facility]  [phone]        Again, thank you for allowing me to participate in the care of your patient.        Sincerely,        Canelo Hewitt, DO

## 2022-02-28 NOTE — PROGRESS NOTES
"  The patient has been notified of following:     \"This telephone visit will be conducted via a call between you and your physician/provider. We have found that certain health care needs can be provided without the need for a physical exam.  This service lets us provide the care you need with a short phone conversation.  If a prescription is necessary we can send it directly to your pharmacy.  If lab work is needed we can place an order for that and you can then stop by our lab to have the test done at a later time.    Telephone visits are billed at different rates depending on your insurance coverage. During this emergency period, for some insurers they may be billed the same as an in-person visit.  Please reach out to your insurance provider with any questions.    If during the course of the call the physician/provider feels a telephone visit is not appropriate, you will not be charged for this service.\"    Patient has given verbal consent to a Telephone visit? Yes    What phone number would you like to be contacted at? [phone]    Patient would like to receive their AVS by [employer]    Additional provider notes:  I spoke with the patient today to discuss the findings of the esophagram.  She does have what appears to be a Schatzki's ring and a small hiatal hernia.  We discussed the next step to include an endoscopy.  However she has a colonoscopy planned in the next year and would prefer to hold off on any new endoscopic procedures for the time being.  She will schedule an EGD at the time she has her colonoscopy with her gastroenterologist.  She will follow-up with me on a as needed basis if she would like me to address her esophageal pathology and reflux.    Phone call duration: 10 minutes    Pravin Hewitt DO [facility]  [phone]    "

## 2022-02-28 NOTE — PROGRESS NOTES
02/28/22 1300   Signing Clinician's Name / Credentials   Signing clinician's name / credentials Crys Mullins, PT   Session Number   Session Number 6/8   Ortho Goal 1   Goal Description Patient will be independent with HEP and self management of condition in 8 weeks.    Goal Progress not met. Patient not compliant with home program.    Ortho Goal 2   Goal Description patient will demonstrate correct posture in standing/sitting without cues in 6 weeks.    Goal Progress Not working on HEP, self management as discussed in therapy    Ortho Goal 3   Goal Description Patient will report 50% decrease in nocturnal arm symptoms in 8 weeks.    Goal Progress sometimes wakes at night, not every night.    Subjective Report   Subjective Report Returns after about 2 months. Doing worse. Having some intermittent cognitive difficulty she attributes to stress. Reports multiple reinjuries of neck related to sleep position and other factors limiting her ROM. Has started using a rowing machine, has used it a couple times. She is not doing her home program.    Objective Measure 1   Details CROM: flex %, ext WNL, (R) rot 90-95%, (L) rot 90-95%, (R) SB 30 deg, (L) SB 34 deg   Objective Measure 2   Details Mod fascial restrictions of (R) pectorals.    Manual Therapy   Skilled Intervention MFR, counterstrain   Treatment Detail (L) DCFA-MS, (R) pec release. Also reassessed ROM, goal status.    Manual Therapy: Mobilization, MFR, MLD, friction massage minutes (38715) 23   Self Care/home Management   ADL/Home Mgmt Training (64403) 5   Skilled Intervention rowing machine, HEP   Treatment Detail recommended trialing at level 1 resistance for 3 min to start. Again discussed doing her home program.    Assessments Completed   Assessments Completed HEP/POC compliance is improving .Patient demonstrates understanding/independence with home program. Response to Intervention is fair. Patient is appropriate to continue with skilled physical therapy  "intervention, as indicated by initial plan of care.   Education   Learner Patient   Readiness Acceptance   Method Booklet/handout;Demonstration   Response Demonstrates Understanding   Plan   Home program scap retraction 10 x 5\", prone LT retraining w/arm lift, 8-10 x 5-10\", rows 10 x orange, pec stretch/corner 5 x 10-15\" , (B) UT stretch , scap stab/hands on wall , lat pulldowns   Plan for next session Recommend follow up with referring provider. Patient will be discharged from PT.    Comments   Comments ref provider: Dayana Calle DO   Total Session Time   Timed Code Treatment Minutes 28   Total Treatment Time (sum of timed and untimed services) 28   AMBULATORY CLINICS ONLY-MEDICAL AND TREATMENT DIAGNOSIS   PT Diagnosis Syringomyelia, poor posture, scapular winging                                                                                  [facility]    Outpatient Physical Therapy Discharge Note  Patient: Carmel Aguilar  : 1970    Beginning/End Dates of Reporting Period:  21  to     Referring Provider: Dayana Calle DO    Therapy Diagnosis: Syringomyelia, poor posture, scapular winging     Client Self Report: (P) Returns after about 2 months. Doing worse. Having some intermittent cognitive difficulty she attributes to stress. Reports multiple reinjuries of neck related to sleep position and other factors limiting her ROM. Has started using a rowing machine, has used it a couple times. She is not doing her home program.     Objective Measurements:     Details: CROM: flex %, ext WNL, (R) rot 90-95%, (L) rot 90-95%, (R) SB 30 deg, (L) SB 34 deg     Details: Mod fascial restrictions of (R) pectorals.       Goals:  Goal Identifier     Goal Description Patient will be independent with HEP and self management of condition in 8 weeks.    Target Date     Date Met      Progress (detail required for progress note): not met. Patient not compliant with home " program.      Goal Identifier     Goal Description patient will demonstrate correct posture in standing/sitting without cues in 6 weeks.    Target Date     Date Met      Progress (detail required for progress note): (P) Not working on HEP, self management as discussed in therapy      Goal Identifier     Goal Description Patient will report 50% decrease in nocturnal arm symptoms in 8 weeks.    Target Date     Date Met      Progress (detail required for progress note): sometimes wakes at night, not every night.      Plan:  Discharge from therapy.    Discharge:    Reason for Discharge: No further expectation of progress.  Patient has not made expected progress due to interrupted treatment attendance.  Patient has not followed through with home program.     Equipment Issued:     Discharge Plan: Patient to continue home program.  Recommended follow up with referring provider.

## 2022-03-01 ENCOUNTER — OFFICE VISIT (OUTPATIENT)
Dept: FAMILY MEDICINE | Facility: CLINIC | Age: 52
End: 2022-03-01
Payer: COMMERCIAL

## 2022-03-01 VITALS — OXYGEN SATURATION: 98 % | DIASTOLIC BLOOD PRESSURE: 60 MMHG | SYSTOLIC BLOOD PRESSURE: 104 MMHG | HEART RATE: 72 BPM

## 2022-03-01 DIAGNOSIS — R10.2 PELVIC PAIN IN FEMALE: ICD-10-CM

## 2022-03-01 DIAGNOSIS — R39.15 URINARY URGENCY: ICD-10-CM

## 2022-03-01 DIAGNOSIS — N89.8 VAGINAL DISCHARGE: ICD-10-CM

## 2022-03-01 DIAGNOSIS — R14.0 BLOATING: Primary | ICD-10-CM

## 2022-03-01 DIAGNOSIS — N94.9: ICD-10-CM

## 2022-03-01 LAB
ALBUMIN UR-MCNC: NEGATIVE MG/DL
APPEARANCE UR: CLEAR
BACTERIA #/AREA URNS HPF: ABNORMAL /HPF
BILIRUB UR QL STRIP: NEGATIVE
CLUE CELLS: ABNORMAL
COLOR UR AUTO: YELLOW
GLUCOSE UR STRIP-MCNC: NEGATIVE MG/DL
HGB UR QL STRIP: ABNORMAL
KETONES UR STRIP-MCNC: NEGATIVE MG/DL
LEUKOCYTE ESTERASE UR QL STRIP: NEGATIVE
NITRATE UR QL: NEGATIVE
PH UR STRIP: 7 [PH] (ref 5–8)
RBC #/AREA URNS AUTO: ABNORMAL /HPF
SP GR UR STRIP: 1.01 (ref 1–1.03)
SQUAMOUS #/AREA URNS AUTO: ABNORMAL /LPF
TRICHOMONAS, WET PREP: ABNORMAL
UROBILINOGEN UR STRIP-ACNC: 0.2 E.U./DL
WBC #/AREA URNS AUTO: ABNORMAL /HPF
WBC'S/HIGH POWER FIELD, WET PREP: ABNORMAL
YEAST, WET PREP: ABNORMAL

## 2022-03-01 PROCEDURE — 81001 URINALYSIS AUTO W/SCOPE: CPT | Performed by: FAMILY MEDICINE

## 2022-03-01 PROCEDURE — 87210 SMEAR WET MOUNT SALINE/INK: CPT | Performed by: FAMILY MEDICINE

## 2022-03-01 PROCEDURE — 99214 OFFICE O/P EST MOD 30 MIN: CPT | Performed by: FAMILY MEDICINE

## 2022-03-01 ASSESSMENT — PATIENT HEALTH QUESTIONNAIRE - PHQ9
SUM OF ALL RESPONSES TO PHQ QUESTIONS 1-9: 12
10. IF YOU CHECKED OFF ANY PROBLEMS, HOW DIFFICULT HAVE THESE PROBLEMS MADE IT FOR YOU TO DO YOUR WORK, TAKE CARE OF THINGS AT HOME, OR GET ALONG WITH OTHER PEOPLE: SOMEWHAT DIFFICULT
SUM OF ALL RESPONSES TO PHQ QUESTIONS 1-9: 12

## 2022-03-01 NOTE — PROGRESS NOTES
Assessment and Plan  Carmel notes onset of a number of symptoms a week after gastrografin esophagram.  She wonders how these are all linked; while not denying this possibility, I am at a loss to explain them all with one diagnosis. Focus on eliminating foods that could cause bloating, and further work up for pelvic pain    Bloating  Discussed sticking to stricter FODMAP avoidance for now (hand out given    Urinary urgency  - UA Macro with Reflex to Micro and Culture - lab collect  - Urine Microscopic   Above are normal    Vaginal discharge  - Wet prep - Clinic Collect: WBCs only    Pelvic pain in female - Sense of pressure   Tenderness of uterus - on exam   family history of ovarian cancer - mother. Pelvic exam adnexa normal  - US Pelvic Complete with Transvaginal        Return if symptoms worsen or fail to improve,  and pending ultrasound results.    Libby Manning MD     -------------------------------------------  Answers for HPI/ROS submitted by the patient on 3/1/2022  If you checked off any problems, how difficult have these problems made it for you to do your work, take care of things at home, or get along with other people?: Somewhat difficult  PHQ9 TOTAL SCORE: 12  How many servings of fruits and vegetables do you eat daily?: 4 or more  On average, how many sweetened beverages do you drink each day (Examples: soda, juice, sweet tea, etc.  Do NOT count diet or artificially sweetened beverages)?: 1  How many minutes a day do you exercise enough to make your heart beat faster?: 10 to 19  How many days a week do you exercise enough to make your heart beat faster?: 3 or less  How many days per week do you miss taking your medication?: 0  What is the reason for your visit today?: Possible infection  When did your symptoms begin?: More than a month  What are your symptoms?: Vaginal discharge, abdominal swelling & discomfort,, farting, burping, acid reflux, bladder urgency  How would you describe these symptoms?:  "Moderate  Are your symptoms:: Worsening  Have you had these symptoms before?: Yes  Have you tried or received treatment for these symptoms before?: No  Is there anything that makes you feel worse?: Eating    Chief concern:     Just remember tat she had antibiotics for a sinus infection last October and wants to add this to her history below    Carmel describes that her troubles seemed to start  after esophagram. What lead to this was ongoing eustachian tube dysfunction, with reflux being considered as a possible cause. She did an endoscopy and says hiatal hernia was noted, but it was \"too small to explain reflux.\" So she had an esophagram:    DATE/TIME: 1/31/2022 7:52 AM     INDICATION: Thickened oropharyngeal phlegm with difficulty hearing at times, intermittently blocked eustachian tubes, GERD HRQOL is 1  COMPARISON: None.  TECHNIQUE: Routine esophagram using water-soluble iodinated contrast material.     FINDINGS:  FLUOROSCOPIC TIME: 2 minutes  NUMBER OF IMAGES: 3 cine clips and 1 static image     ESOPHAGUS: Small 2 cm diameter sliding-type esophageal hiatal hernia with a 19 mm B-ring and small volume gastroesophageal reflux into the lower esophagus in the recumbent position. Trace aspiration into the upper trachea when drinking from a straw in   the prone position resulted in immediate effective cough. Normal esophageal peristalsis with no stricture, mass or inflammatory change.                                                                       IMPRESSION:  1.  Small 2 cm diameter sliding-type esophageal hiatal hernia with a 19 mm B-ring and small volume gastroesophageal reflux into the lower esophagus in the recumbent position.   2.  Trace aspiration into the upper trachea when drinking from a straw in the prone position resulted in immediate effective cough.  3.  Esophagus otherwise normal.    She notes a history of IBS - she had previously gotten this under control be doing an elimination diet which had the " "side effect of leading to an eating disorder.  However she has been well controlled for years as long as she avoids raw celery, carrots and corn. However the says the gastrografin contrast gave her daylong diarrhea and awful reflux , and though she was better during the first week after this event,  since then things have been worse, and wonders whether the dye could have irritated her or causes panic that lead to symptoms  :   - loose, \"crumbly\" stools (not her usual formed)   - terrible bloating on and off   - feeling of pressure in her pelvic area   - vaginal discharge, and dry, irritated vulva   - urinary urgency    She tried a probiotic drink and that made it worse. She had coleslaw 2 days ago.  Notes her son has fructose malabsorption, and that she herself has a previous history of bowel obstruction.  She is from [place] - some but not all history accessible through [employer] and media (reviewed)     She had the same discomfort and swelling in her pelvis when she was having her menses, which she now has not had for almost a year. She was diagnosed with pelvic congestion syndrome in the past. She has found a practitioner who will prescribed compounded estrogen cream  - notes a low dose since her mother had history of ovarian cancer - and progesterone pills      Current Outpatient Medications   Medication     ASHWAGANDHA PO     azelaic acid (FINACIA) 15 % external gel     hydrocortisone 2.5 % cream     MAGNESIUM GLYCINATE PO     Omega-3 Fatty Acids (FISH OIL) 1200 MG capsule     progesterone (PROMETRIUM) 100 MG capsule     SYNTHROID 112 MCG tablet     traZODone (DESYREL) 50 MG tablet     UNABLE TO FIND     UNABLE TO FIND     vitamin D3 (VITAMIN D3) 50 mcg (2000 units) tablet     No current facility-administered medications for this visit.       The history section was last reviewed by Agnieszka Webb on Mar 1, 2022.    History   Smoking Status     Never Smoker   Smokeless Tobacco     Never Used       Social " History    Substance and Sexual Activity      Alcohol use: Not Currently        /60   Pulse 72   SpO2 98%   There is no height or weight on file to calculate BMI.     EXAM:    General appearance - alert, well appearing, and in no distress  Mental status - worried  Abdomen - soft, NOT bloated, no organomegalyl, mild diffuse tenderness  Pelvic - VULVA: vulvar erythema just below vagina, otherwise normal, VAGINA: vaginal discharge - white and scant, CERVIX: normal appearing cervix without discharge or lesions, UTERUS: uterus normal size (about plum sized) but tender, ADNEXA: normal adnexa in size, nontender and no masses

## 2022-03-02 ASSESSMENT — PATIENT HEALTH QUESTIONNAIRE - PHQ9: SUM OF ALL RESPONSES TO PHQ QUESTIONS 1-9: 12

## 2022-03-15 ENCOUNTER — HOSPITAL ENCOUNTER (OUTPATIENT)
Dept: ULTRASOUND IMAGING | Facility: HOSPITAL | Age: 52
Discharge: HOME | End: 2022-03-15
Attending: FAMILY MEDICINE | Admitting: FAMILY MEDICINE
Payer: COMMERCIAL

## 2022-03-15 DIAGNOSIS — N94.9: ICD-10-CM

## 2022-03-15 DIAGNOSIS — R10.2 PELVIC PAIN IN FEMALE: ICD-10-CM

## 2022-03-15 PROCEDURE — 76830 TRANSVAGINAL US NON-OB: CPT

## 2022-03-17 ENCOUNTER — VIRTUAL VISIT (OUTPATIENT)
Dept: SLEEP MEDICINE | Facility: CLINIC | Age: 52
End: 2022-03-17
Payer: COMMERCIAL

## 2022-03-17 VITALS — BODY MASS INDEX: 21.37 KG/M2 | WEIGHT: 141 LBS | HEIGHT: 68 IN

## 2022-03-17 DIAGNOSIS — G47.33 OSA (OBSTRUCTIVE SLEEP APNEA): ICD-10-CM

## 2022-03-17 DIAGNOSIS — F51.04 CHRONIC INSOMNIA: Primary | ICD-10-CM

## 2022-03-17 PROCEDURE — 98968 PH1 ASSMT&MGMT NQHP 21-30: CPT | Performed by: PSYCHOLOGIST

## 2022-03-17 ASSESSMENT — SLEEP AND FATIGUE QUESTIONNAIRES
HOW LIKELY ARE YOU TO NOD OFF OR FALL ASLEEP WHILE LYING DOWN TO REST IN THE AFTERNOON WHEN CIRCUMSTANCES PERMIT: MODERATE CHANCE OF DOZING
HOW LIKELY ARE YOU TO NOD OFF OR FALL ASLEEP WHILE SITTING INACTIVE IN A PUBLIC PLACE: WOULD NEVER DOZE
HOW LIKELY ARE YOU TO NOD OFF OR FALL ASLEEP WHEN YOU ARE A PASSENGER IN A CAR FOR AN HOUR WITHOUT A BREAK: SLIGHT CHANCE OF DOZING
HOW LIKELY ARE YOU TO NOD OFF OR FALL ASLEEP WHILE SITTING AND TALKING TO SOMEONE: WOULD NEVER DOZE
HOW LIKELY ARE YOU TO NOD OFF OR FALL ASLEEP WHILE SITTING AND READING: MODERATE CHANCE OF DOZING
HOW LIKELY ARE YOU TO NOD OFF OR FALL ASLEEP IN A CAR, WHILE STOPPED FOR A FEW MINUTES IN TRAFFIC: WOULD NEVER DOZE
HOW LIKELY ARE YOU TO NOD OFF OR FALL ASLEEP WHILE SITTING INACTIVE IN A PUBLIC PLACE: WOULD NEVER DOZE
HOW LIKELY ARE YOU TO NOD OFF OR FALL ASLEEP IN A CAR, WHILE STOPPED FOR A FEW MINUTES IN TRAFFIC: WOULD NEVER DOZE
HOW LIKELY ARE YOU TO NOD OFF OR FALL ASLEEP WHILE SITTING QUIETLY AFTER LUNCH WITHOUT ALCOHOL: SLIGHT CHANCE OF DOZING
HOW LIKELY ARE YOU TO NOD OFF OR FALL ASLEEP WHILE SITTING AND TALKING TO SOMEONE: WOULD NEVER DOZE
HOW LIKELY ARE YOU TO NOD OFF OR FALL ASLEEP WHILE WATCHING TV: WOULD NEVER DOZE
HOW LIKELY ARE YOU TO NOD OFF OR FALL ASLEEP WHILE SITTING AND READING: MODERATE CHANCE OF DOZING
HOW LIKELY ARE YOU TO NOD OFF OR FALL ASLEEP WHILE WATCHING TV: WOULD NEVER DOZE
HOW LIKELY ARE YOU TO NOD OFF OR FALL ASLEEP WHILE LYING DOWN TO REST IN THE AFTERNOON WHEN CIRCUMSTANCES PERMIT: MODERATE CHANCE OF DOZING
HOW LIKELY ARE YOU TO NOD OFF OR FALL ASLEEP WHEN YOU ARE A PASSENGER IN A CAR FOR AN HOUR WITHOUT A BREAK: SLIGHT CHANCE OF DOZING
HOW LIKELY ARE YOU TO NOD OFF OR FALL ASLEEP WHILE SITTING QUIETLY AFTER LUNCH WITHOUT ALCOHOL: SLIGHT CHANCE OF DOZING

## 2022-03-17 ASSESSMENT — PATIENT HEALTH QUESTIONNAIRE - PHQ9
10. IF YOU CHECKED OFF ANY PROBLEMS, HOW DIFFICULT HAVE THESE PROBLEMS MADE IT FOR YOU TO DO YOUR WORK, TAKE CARE OF THINGS AT HOME, OR GET ALONG WITH OTHER PEOPLE: SOMEWHAT DIFFICULT
SUM OF ALL RESPONSES TO PHQ QUESTIONS 1-9: 14
SUM OF ALL RESPONSES TO PHQ QUESTIONS 1-9: 14

## 2022-03-17 NOTE — PROGRESS NOTES
"Carmel Fischer is a 51 year old female being evaluated via a billable telephone visit.     \"This telephone visit will be conducted via a call between you and your physician/provider. We have found that certain health care needs can be provided without the need for an in-person visit or physical exam.  This service lets us provide the care you need with a telephone conversation.  If a prescription is necessary we can send it directly to your pharmacy.  If lab work is needed we can place an order for that and you can then stop by our lab to have the test done at a later time.\"    Telephone visits are billed at different rates depending on your insurance coverage.  Please reach out to your insurance provider with any questions.    Patient has given verbal consent for  a Telephone visit? Yes    What telephone number would you like your provider to contact at at:  [phone]    How would you like to obtain your AVS? Ervin Ochoa    Telephone Visit Details:     Telephone Visit Start Time: 4:06 PM    Telephone Visit End Time:4:48 PM         SLEEP MEDICINE  TELEPHONE FOLLOW-UP VISIT  Sleep Psychology    Patient Name: Carmel Fischer  MRN:  [id number]  Date of Service: Mar 17, 2022       Subjective Report     Carmel Fischer  returns for a telephone visit to discuss progress in implementing behavioral strategies for the management of insomnia.  Patient consent for initiation of telephone visit was obtained and documented prior to initiation of visit.     Carmel reports very good response to a combination of stimulus control, reduce time in bed and counseling around addressing maladaptive thoughts and beliefs about sleep.  Today we focused on the impact of anxiety and sleep, specifically as it relates to a sensation of panic when using CPAP in the past.  Reintroduced process of how to desensitize to the use of CPAP.    Patient feels that with improved overall sleep quality and internalizing of new sleep " "habits that she is ready to consider CPAP again.  Discussed following up with Dr. Chung to initiate referral for CPAP equipment..     .     Sleep Data:     Source of Sleep Estimates:  Verbal Self-report    Average Time in bed: 7.5  Average Total Sleep Time: Six-point 5-7 hrs  Sleep Efficiency: Per her sleep apnea records, between 80-90%      EPWORTH SLEEPINESS SCALE    Sitting and reading 2   Watching TV 0   Sitting, inactive in a public place (theatre or mtg.) 0    As a passenger in a car 1   Lying down to rest in the afternoon when circumstance permit 2   Sitting and talking to someone 0   Sitting quietly after lunch without alcohol 1   In a car, while stopped for a few minutes in traffic 0   TOTAL SCORE (nl <11) 6     INSOMNIA SEVERITY INDEX     Difficulty falling asleep 2   Difficult staying asleep 2   Problems waking up to early 2   How SATISFIED/DISSATISFIED are you with your CURRENT sleep pattern? 2   How NOTICEABLE to others do you think your sleep pattern is in terms of your quality of life? 1   How WORRIED/DISTRESSED are you about your current sleep pattern? 1   To what extent do you consider your sleep problem to INTERFERE with your daily fuctioning(e.g. daytime fatigue, mood, ability to function at work/daily chores, concentration, mood,etc.) CURRENTLY? 2   INSOMNIA SEVERITY INDEX TOTAL SCORE 12    Absence of insomnia (0-7); sub-threshold insomnia (8-14); moderate insomnia (15-21); and severe insomnia (22-28)       Interventions     Strategies and recommendations including Stimulus control therapy, Sleep compression therapy and PAP desensitization were discussed today.       Vital Signs     Ht 1.727 m (5' 8\")   Wt 64 kg (141 lb)   BMI 21.44 kg/m       Mental Status   Orientation:  X3  Mood:  normal  Speech/Language:  Normal  Thought Process: Intact  Associations:  Normal  Thought Content: Normal  Patient does not report any suicidal ideation, intention or plan.      Diagnostic Impressions and Plan " "       Chronic insomnia  MICHAEL (obstructive sleep apnea)    Carmel has demonstrated good response to CBT for insomnia with maintenance of behavioral change and improvement in sleep quality, sleep efficiency now within normal and increased confidence that behavioral strategies work.  She indicates that she would like to now move on to consider reintroduction of CPAP therapy for treatment of her sleep apnea.  We reviewed desensitization protocol which she will be guided through that also takes into account \"current anxiety and history of panic.    Plan:  Continue current sleep schedule and plan , Patient to follow-up with Dr. Chung to obtain order for CPAP machine and supplies.,  Once she secures her CPAP machine, follow-up with sleep psychology to work on a desensitization protocol including practiced control breathing exercises, graduated increase in use of CPAP for periods of 10-15 minutes during the day, use of CPAP and reclining position during the day for 15 minutes, followed by introduction to CPAP at night.  At the initiation of CPAP at night, we will reduce time in bed by 15minutes temporarily.    Follow-up: Sleep medicine to change prescription for CPAP and supplies.  Follow-up with sleep psychology once these have been secured to proceed with a desensitization protocol      Jesus Quigley PsyD, LP, [facility]  Diplomate, Behavioral Sleep Medicine  [facility]    Note: This dictation was created using voice recognition software. This document may contain an error not identified before finalizing the document. If the error changes the accuracy of the document, I would appreciate it being brought to my attention.                                                       "

## 2022-03-18 ASSESSMENT — PATIENT HEALTH QUESTIONNAIRE - PHQ9: SUM OF ALL RESPONSES TO PHQ QUESTIONS 1-9: 14

## 2022-04-01 ENCOUNTER — MYC MEDICAL ADVICE (OUTPATIENT)
Dept: DERMATOLOGY | Facility: CLINIC | Age: 52
End: 2022-04-01
Payer: COMMERCIAL

## 2022-04-04 NOTE — TELEPHONE ENCOUNTER
Hard to say from photo but could be a low risk basal cell skin cancer.  If she wants to see another provider earlier/be put on the wait list etc, we can do that.  Otherwise if nothing sooner we can use my next available ASHLEE.

## 2022-04-19 ENCOUNTER — NURSE TRIAGE (OUTPATIENT)
Dept: NURSING | Facility: CLINIC | Age: 52
End: 2022-04-19
Payer: COMMERCIAL

## 2022-04-19 NOTE — TELEPHONE ENCOUNTER
Patient is calling and states that she started vomiting around 10 pm last night and then dry heaving started also.  Patient is trying to get down broth and juice.  Patient feels that it could be food poisoning.  Patient vomited around three times.  Patient denies blood in vomit.  Patient denies black tarry stools.  Patient denies fever cough and shortness of breath.  Patient states that she will continue to monitor symptoms and phone back if necessary.      COVID 19 Nurse Triage Plan/Patient Instructions    Please be aware that novel coronavirus (COVID-19) may be circulating in the community. If you develop symptoms such as fever, cough, or SOB or if you have concerns about the presence of another infection including coronavirus (COVID-19), please contact your health care provider or visit [email or web address].     Disposition/Instructions    Home care recommended. Follow home care protocol based instructions.    Thank you for taking steps to prevent the spread of this virus.  o Limit your contact with others.  o Wear a simple mask to cover your cough.  o Wash your hands well and often.    Resources    [facility]: About COVID-19: [email or web address]    CDC: What to Do If You're Sick: www.cdc.gov/coronavirus/2019-ncov/about/steps-when-sick.html    CDC: Ending Home Isolation: www.cdc.gov/coronavirus/2019-ncov/hcp/disposition-in-home-patients.html     CDC: Caring for Someone: www.cdc.gov/coronavirus/2019-ncov/if-you-are-sick/care-for-someone.html     [facility]: Interim Guidance for Hospital Discharge to Home: www.[email or web address]/diseases/coronavirus/hcp/hospdischarge.pdf    [facility] clinical trials (COVID-19 research studies): [email or web address]     Below are the COVID-19 hotlines at the [place] Department of Health ([facility]). Interpreters are available.   o For health questions: Call 667-188-9532 or 1-607.626.2995 (7 a.m. to 7 p.m.)  o For questions about  schools and childcare: Call 768-020-7054 or 1-211.960.3411 (7 a.m. to 7 p.m.)                       Reason for Disposition    Vomiting    Additional Information    Negative: Shock suspected (e.g., cold/pale/clammy skin, too weak to stand, low BP, rapid pulse)    Negative: Difficult to awaken or acting confused (e.g., disoriented, slurred speech)    Negative: Sounds like a life-threatening emergency to the triager    Negative: SEVERE vomiting (e.g., 6 or more times/day) (Exception: patient sounds well, is drinking liquids, does not sound dehydrated, and vomiting has lasted less than 24 hours)    Negative: MODERATE vomiting (e.g., 3 - 5 times/day) and age > 60    Negative: Vomiting contains bile (green color)    Negative: Vomiting red blood or black (coffee ground) material    Negative: Insulin-dependent diabetes and glucose > 240 mg/dL (13 mmol/L)    Negative: Recent head injury (within 3 days)    Negative: Recent abdominal injury (within 7 days)    Negative: Drinking very little and has signs of dehydration (e.g., no urine > 12 hours, very dry mouth, very lightheaded)    Negative: Constant abdominal pain lasting > 2 hours    Negative: High-risk adult (e.g., brain tumor, V-P shunt, hernia)    Negative: Severe pain in one eye    Negative: Patient sounds very sick or weak to the triager    Negative: Vomiting and abdomen looks much more swollen than usual    Negative: Fever > 103 F (39.4 C)    Negative: Fever > 101 F (38.3 C) and over 60 years of age    Negative: Fever > 100.0 F (37.8 C) and has a weak immune system (e.g., HIV positive, cancer chemo, organ transplant, splenectomy, chronic steroids)    Negative: Fever > 100.0 F (37.8 C) and bedridden (e.g., nursing home patient, stroke, chronic illness, recovering from surgery)    Negative: Taking any of the following medications: digoxin (Lanoxin), lithium, theophylline, phenytoin (Dilantin)    Negative: SEVERE headache and vomiting    Negative: MILD to MODERATE  vomiting (e.g., 1-5 times/day) and lasts > 48 hours (2 days)    Negative: Fever present > 3 days (72 hours)    Negative: Patient wants to be seen    Negative: Vomiting a prescription medication    Negative: Alcohol abuse, known or suspected    Negative: Vomiting is a chronic symptom (recurrent or ongoing AND lasting > 4 weeks)    Protocols used: VOMITING-A-OH

## 2022-04-21 ENCOUNTER — OFFICE VISIT (OUTPATIENT)
Dept: URGENT CARE | Facility: URGENT CARE | Age: 52
End: 2022-04-21
Payer: COMMERCIAL

## 2022-04-21 VITALS
DIASTOLIC BLOOD PRESSURE: 64 MMHG | BODY MASS INDEX: 21.44 KG/M2 | HEIGHT: 68 IN | RESPIRATION RATE: 15 BRPM | SYSTOLIC BLOOD PRESSURE: 90 MMHG | TEMPERATURE: 98.1 F | OXYGEN SATURATION: 98 % | HEART RATE: 80 BPM

## 2022-04-21 DIAGNOSIS — K58.2 IRRITABLE BOWEL SYNDROME WITH BOTH CONSTIPATION AND DIARRHEA: ICD-10-CM

## 2022-04-21 DIAGNOSIS — K52.9 GASTROENTERITIS: Primary | ICD-10-CM

## 2022-04-21 PROCEDURE — 99214 OFFICE O/P EST MOD 30 MIN: CPT | Performed by: STUDENT IN AN ORGANIZED HEALTH CARE EDUCATION/TRAINING PROGRAM

## 2022-04-21 RX ORDER — DICYCLOMINE HCL 20 MG
20 TABLET ORAL 4 TIMES DAILY PRN
Qty: 30 TABLET | Refills: 0 | Status: SHIPPED | OUTPATIENT
Start: 2022-04-21

## 2022-04-21 NOTE — PROGRESS NOTES
"Assessment & Plan     Gastroenteritis  Irritable bowel syndrome with both constipation and diarrhea  - Suspect gastroenteritis on Monday/Tuesday with emesis, diarrhea and chills, that she has now recovered from.   - Episode of gastroenteritis likely triggered IBS flare, with abdominal cramping as GI function returns to normal. Tolerating full diet currently without emesis or signs of hypovolemia on exam.   - Rx sent for dicyclomine 20mg QID PRN  - Discussed bland diet while GI tract recovers, as well as rest and hydration    Patient was advised to return to clinic if symptoms do not improve or if symptoms worsen. Patient educated on red flag symptoms and asked to go directly to the ED if symptoms present themselves.     35 minutes spent on the date of the encounter doing chart review, history and exam, documentation and further activities per the note        Merrill Veliz MD   [place] [facility]    Ifeanyi Burt is a 51 year old female with IBS, GERD, small hiatal hernia who presents to clinic today for the following health issues:    Chief Complaint   Patient presents with     Urgent Care     Gastrointestinal Problem     Started vomiting on Monday but today having upset stomach and nausea, pain bloating. H/O IBS.      HPI    - Symptoms started Monday night. After dinner, she started to feel increasingly nauseous. Around 11pm, she had 1 small emesis. Around 2 am on Tuesday morning, she had multiple episodes of emesis, with dry heaving. Total of ~5 episodes of vomiting.   - Tuesday during the day, she developed body aches, chills. Tmax at home 99.6F. Also developed 2 episodes of diarrhea. Symptoms improved by Wednesday morning without medications.   - She stuck to a liquid diet yesterday, then was able to eat solid foods yesterday evening.   - This AM, felt abdominal pain diffusely, which started with a BM. BM this AM was looser but not \"diarrhea.\" After 3 hours, abomdoinal pain started to subside. Pain " "currently is 1/10. Still has poor appetite.   - In the course of elimiating foods to treat her IBS, she says she developed an \"eating disorder,\" which was avoidant and restrictive. No purging behaviors.    - Today patient denies fevers, chills, hematemesis, hematochezia, melena, flank pain, dysuria, hematuria, sore throat, cough, rhinorrhea, ear pain, rashes. Already had sausage, water, and electrolyte tablet today without vomiting.     - She does have a history of bowel obstructions, last in 2018  - Surgical history:  x 1      Medications/Supplements  - Fish oil, Vit D, zinc+copper, magnesium supplements, estrogen, and synthroid. TSH last checked in Oct, 2021, which was normal.       Patient Active Problem List    Diagnosis Date Noted     NAPOLEON (generalized anxiety disorder) 2021     Priority: Medium     Insomnia 2021     Priority: Medium     Autonomic dysfunction 2021     Priority: Medium     SVT (supraventricular tachycardia) (H) 2021     Priority: Medium     Syncope 2021     Priority: Medium     MICHAEL (obstructive sleep apnea) 2021     Priority: Medium     Not using CPAP       Hypothyroidism 2021     Priority: Medium     Irritable bowel syndrome 2021     Priority: Medium     Syringomyelia (H) 2021     Priority: Medium     Hypoglycemia 2021     Priority: Medium     Rosacea 2021     Priority: Medium     Seasonal allergic rhinitis 2021     Priority: Medium     Bruxism 2021     Priority: Medium     PVC's (premature ventricular contractions) 2021     Priority: Medium     Just noted on holter       Plantar fasciitis 2021     Priority: Medium     PVD (posterior vitreous detachment), bilateral 2021     Priority: Medium     Auditory processing disorder 2021     Priority: Medium     Female stress incontinence 2020     Priority: Medium       Current Outpatient Medications   Medication     MAGNESIUM GLYCINATE PO " "    Omega-3 Fatty Acids (FISH OIL) 1200 MG capsule     progesterone (PROMETRIUM) 100 MG capsule     SYNTHROID 112 MCG tablet     traZODone (DESYREL) 50 MG tablet     vitamin D3 (CHOLECALCIFEROL) 50 mcg (2000 units) tablet     ASHWAGANDHA PO     azelaic acid (FINACIA) 15 % external gel     hydrocortisone 2.5 % cream     UNABLE TO FIND     UNABLE TO FIND     No current facility-administered medications for this visit.           Objective    BP 90/64   Pulse 80   Temp 98.1  F (36.7  C) (Temporal)   Resp 15   Ht 1.727 m (5' 8\")   SpO2 98%   BMI 21.44 kg/m       Physical Exam     GENERAL: Alert and no distress, interactive.   HEENT: MMM, pharynx clear, EOMI  NECK: no adenopathy, no asymmetry  RESP: lungs clear to auscultation - no rales, rhonchi or wheezes  CV: regular rate and rhythm, normal S1 S2, no S3 or S4, no murmur, click or rub, no peripheral edema and peripheral pulses strong  ABDOMEN: soft, nontender, no hepatosplenomegaly, no masses and bowel sounds normal. Non-distended. No CVA tenderness or suprapubic tenderness.   MSK: no gross musculoskeletal defects noted, no edema      The use of Dragon/[employer] dictation services may have been used to construct the content in this note; any grammatical or spelling errors are non-intentional. Please contact the author of this note directly if you are in need of any clarification.     "

## 2022-04-21 NOTE — PATIENT INSTRUCTIONS
- Continue to eat a bland diet while your stomach and intestines recover  - Use the dicyclomine as prescribed for pain/cramping  - Come back to the clinic if you have worsening or severe pain, inability to tolerate liquids, fever > 101 F, bloody stool/vomit, or any other concerning symptoms.

## 2022-04-22 DIAGNOSIS — E03.9 ACQUIRED HYPOTHYROIDISM: Primary | ICD-10-CM

## 2022-04-22 RX ORDER — LEVOTHYROXINE SODIUM 112 MCG
112 TABLET ORAL EVERY EVENING
Qty: 90 TABLET | Refills: 3 | Status: SHIPPED | OUTPATIENT
Start: 2022-04-22 | End: 2022-07-08

## 2022-04-22 NOTE — TELEPHONE ENCOUNTER
Reason for Call:  Medication or medication refill:    Do you use a [facility]?  Name of the pharmacy and phone number for the current request:  [email or web address]    Name of the medication requested:   SYNTHROID 112 MCG tablet   6/19/2021  Yes   Sig - Route: Take 112 mcg by mouth every evening  - Oral         Other request: pharm was [facility] to [facility] and only has 2 pills left and will run out on Saturday.    Can we leave a detailed message on this number? YES    Phone number patient can be reached at: Cell number on file:    Telephone Information:   Mobile [phone]       Best Time: any    Call taken on 4/22/2022 at 12:44 PM by Pam J. Behr

## 2022-04-22 NOTE — TELEPHONE ENCOUNTER
"Outpatient Medication Detail     Disp Refills Start End ARPAN   SYNTHROID 112 MCG tablet   6/19/2021  Yes   Sig - Route: Take 112 mcg by mouth every evening  - Oral   Class: Historical   Order: [id number]     Routing refill request to provider for review/approval because:  Medication is reported/historical    Last office visit provider:  3/1/22     Requested Prescriptions   Pending Prescriptions Disp Refills     SYNTHROID 112 MCG tablet 90 tablet 3     Sig: Take 1 tablet (112 mcg) by mouth every evening       Thyroid Protocol Passed - 4/22/2022  1:37 PM        Passed - Patient is 12 years or older        Passed - Recent (12 mo) or future (30 days) visit within the authorizing provider's specialty     Patient has had an office visit with the authorizing provider or a provider within the authorizing providers department within the previous 12 mos or has a future within next 30 days. See \"Patient Info\" tab in inbasket, or \"Choose Columns\" in Meds & Orders section of the refill encounter.              Passed - Medication is active on med list        Passed - Normal TSH on file in past 12 months     Recent Labs   Lab Test 10/13/21  0957   TSH 1.31              Passed - No active pregnancy on record     If patient is pregnant or has had a positive pregnancy test, please check TSH.          Passed - No positive pregnancy test in past 12 months     If patient is pregnant or has had a positive pregnancy test, please check TSH.               Walt Major RN 04/22/22 1:38 PM  "

## 2022-05-26 ENCOUNTER — LAB (OUTPATIENT)
Dept: LAB | Facility: CLINIC | Age: 52
End: 2022-05-26

## 2022-05-26 ENCOUNTER — MYC MEDICAL ADVICE (OUTPATIENT)
Dept: FAMILY MEDICINE | Facility: CLINIC | Age: 52
End: 2022-05-26

## 2022-05-26 ENCOUNTER — LAB (OUTPATIENT)
Dept: LAB | Facility: CLINIC | Age: 52
End: 2022-05-26
Payer: COMMERCIAL

## 2022-05-26 DIAGNOSIS — F41.1 GENERALIZED ANXIETY DISORDER: ICD-10-CM

## 2022-05-26 DIAGNOSIS — F41.1 GENERALIZED ANXIETY DISORDER: Primary | ICD-10-CM

## 2022-05-26 DIAGNOSIS — F42.9 OBSESSIVE-COMPULSIVE DISORDER, UNSPECIFIED TYPE: ICD-10-CM

## 2022-05-26 DIAGNOSIS — G47.00 INSOMNIA, UNSPECIFIED: ICD-10-CM

## 2022-05-26 DIAGNOSIS — F41.0 PANIC DISORDER (EPISODIC PAROXYSMAL ANXIETY): ICD-10-CM

## 2022-05-26 LAB
LAB DIRECTOR COMMENTS: NORMAL
LAB DIRECTOR DISCLAIMER: NORMAL
LAB DIRECTOR INTERPRETATION: NORMAL
LAB DIRECTOR METHODOLOGY: NORMAL
LAB DIRECTOR RESULTS: NORMAL
SPECIMEN DESCRIPTION: NORMAL

## 2022-05-26 PROCEDURE — G0452 MOLECULAR PATHOLOGY INTERPR: HCPCS | Performed by: STUDENT IN AN ORGANIZED HEALTH CARE EDUCATION/TRAINING PROGRAM

## 2022-05-26 PROCEDURE — 36415 COLL VENOUS BLD VENIPUNCTURE: CPT

## 2022-05-26 PROCEDURE — 81291 MTHFR GENE: CPT

## 2022-05-29 ENCOUNTER — NURSE TRIAGE (OUTPATIENT)
Dept: NURSING | Facility: CLINIC | Age: 52
End: 2022-05-29
Payer: COMMERCIAL

## 2022-05-29 ENCOUNTER — OFFICE VISIT (OUTPATIENT)
Dept: URGENT CARE | Facility: URGENT CARE | Age: 52
End: 2022-05-29
Payer: COMMERCIAL

## 2022-05-29 VITALS
DIASTOLIC BLOOD PRESSURE: 64 MMHG | WEIGHT: 133 LBS | TEMPERATURE: 98.3 F | HEART RATE: 74 BPM | BODY MASS INDEX: 20.16 KG/M2 | RESPIRATION RATE: 14 BRPM | OXYGEN SATURATION: 96 % | HEIGHT: 68 IN | SYSTOLIC BLOOD PRESSURE: 112 MMHG

## 2022-05-29 DIAGNOSIS — R25.2 MUSCLE CRAMPING: Primary | ICD-10-CM

## 2022-05-29 LAB
ALBUMIN SERPL-MCNC: 3.9 G/DL (ref 3.4–5)
ALP SERPL-CCNC: 74 U/L (ref 40–150)
ALT SERPL W P-5'-P-CCNC: 26 U/L (ref 0–50)
ANION GAP SERPL CALCULATED.3IONS-SCNC: 4 MMOL/L (ref 3–14)
AST SERPL W P-5'-P-CCNC: 19 U/L (ref 0–45)
BILIRUB SERPL-MCNC: 0.4 MG/DL (ref 0.2–1.3)
BUN SERPL-MCNC: 19 MG/DL (ref 7–30)
CALCIUM SERPL-MCNC: 8.6 MG/DL (ref 8.5–10.1)
CALCIUM SERPL-MCNC: 8.6 MG/DL (ref 8.5–10.1)
CHLORIDE BLD-SCNC: 107 MMOL/L (ref 94–109)
CO2 SERPL-SCNC: 28 MMOL/L (ref 20–32)
CREAT SERPL-MCNC: 0.65 MG/DL (ref 0.52–1.04)
GFR SERPL CREATININE-BSD FRML MDRD: >90 ML/MIN/1.73M2
GLUCOSE BLD-MCNC: 91 MG/DL (ref 70–99)
MAGNESIUM SERPL-MCNC: 1.8 MG/DL (ref 1.6–2.3)
POTASSIUM BLD-SCNC: 3.9 MMOL/L (ref 3.4–5.3)
PROT SERPL-MCNC: 7.6 G/DL (ref 6.8–8.8)
SODIUM SERPL-SCNC: 139 MMOL/L (ref 133–144)
T4 FREE SERPL-MCNC: 1.08 NG/DL (ref 0.76–1.46)
TSH SERPL DL<=0.005 MIU/L-ACNC: 6.22 MU/L (ref 0.4–4)

## 2022-05-29 PROCEDURE — 84439 ASSAY OF FREE THYROXINE: CPT | Performed by: FAMILY MEDICINE

## 2022-05-29 PROCEDURE — 36415 COLL VENOUS BLD VENIPUNCTURE: CPT | Performed by: FAMILY MEDICINE

## 2022-05-29 PROCEDURE — 84443 ASSAY THYROID STIM HORMONE: CPT | Performed by: FAMILY MEDICINE

## 2022-05-29 PROCEDURE — 83735 ASSAY OF MAGNESIUM: CPT | Performed by: FAMILY MEDICINE

## 2022-05-29 PROCEDURE — 80053 COMPREHEN METABOLIC PANEL: CPT | Performed by: FAMILY MEDICINE

## 2022-05-29 PROCEDURE — 99213 OFFICE O/P EST LOW 20 MIN: CPT | Performed by: FAMILY MEDICINE

## 2022-05-29 NOTE — RESULT ENCOUNTER NOTE
Pretty much okay. The elevated TSH means you are slightly low on the dose of your thyroid and they might want to raise it slightly-talk to your regular doctor about this

## 2022-05-29 NOTE — TELEPHONE ENCOUNTER
Nurse Triage SBAR    Is this a 2nd Level Triage? NO    Situation: Triage call for leg pain/cramping.  Soonest appointment available is in 2 weeks.  Pt calling to find out if she should be seen sooner.     Assessment:     51 yrs old  Cramping and tightening in her lower extremities x 3 weeks.  Intense cramping comes and goes.  After the intense cramp, pt has a residual tightness.   Pt has tried increasing fluids and salt intake.     She denies swelling, fever, discoloration.      Pt also notes more frequent episodes of SVT.     Protocol Recommended Disposition: See Physician within 4 hours (Or PCP Triage)    Recommendation: UC within 4 hours.  Patient verbalized understanding and had no further questions.  She will go to the [facility] location.     COVID 19 Nurse Triage Plan/Patient Instructions    Please be aware that novel coronavirus (COVID-19) may be circulating in the community. If you develop symptoms such as fever, cough, or SOB or if you have concerns about the presence of another infection including coronavirus (COVID-19), please contact your health care provider or visit [email or web address].     Disposition/Instructions    In-Person Visit with provider recommended. Reference Visit Selection Guide.    Thank you for taking steps to prevent the spread of this virus.  o Limit your contact with others.  o Wear a simple mask to cover your cough.  o Wash your hands well and often.    Resources    [facility]: About COVID-19: [email or web address]    CDC: What to Do If You're Sick: www.cdc.gov/coronavirus/2019-ncov/about/steps-when-sick.html    CDC: Ending Home Isolation: www.cdc.gov/coronavirus/2019-ncov/hcp/disposition-in-home-patients.html     CDC: Caring for Someone: www.cdc.gov/coronavirus/2019-ncov/if-you-are-sick/care-for-someone.html     [facility]: Interim Guidance for Hospital Discharge to Home: www.health.[facility]/diseases/coronavirus/hcp/hospdischarge.pdf    [facility]  "clinical trials (COVID-19 research studies): [email or web address]-trials     Below are the COVID-19 hotlines at the [place] Department of [facility] ([facility]). Interpreters are available.   o For health questions: Call 245-300-9791 or 1-310.596.5049 (7 a.m. to 7 p.m.)  o For questions about schools and childcare: Call 996-498-5379 or 1-463.216.3280 (7 a.m. to 7 p.m.)     [facility] Nurse Advisor      Reason for Disposition    [1] Skipped or extra beat(s) AND [2] increases with exercise or exertion    Additional Information    Negative: Looks like a broken bone or dislocated joint (e.g., crooked or deformed)    Negative: Sounds like a life-threatening emergency to the triager    Negative: Chest pain    Negative: Difficulty breathing    Negative: Entire foot is cool or blue in comparison to other side    Negative: Unable to walk    Negative: [1] Red area or streak AND [2] fever    Negative: [1] Swollen joint AND [2] fever    Negative: [1] Cast on leg or ankle AND [2] now increased pain    Negative: Patient sounds very sick or weak to the triager    Negative: [1] SEVERE pain (e.g., excruciating, unable to do any normal activities) AND [2] not improved after 2 hours of pain medicine    Negative: [1] Thigh or calf pain AND [2] only 1 side AND [3] present > 1 hour    Negative: [1] Thigh, calf, or ankle swelling AND [2] only 1 side    Negative: [1] Thigh, calf, or ankle swelling AND [2] bilateral AND [3] 1 side is more swollen    Negative: [1] Red area or streak AND [2] large (> 2 in. or 5 cm)    Negative: History of prior \"blood clot\" in leg or lungs (i.e., deep vein thrombosis, pulmonary embolism)    Negative: History of inherited increased risk of blood clots (e.g., Factor 5 Leiden, Anti-thrombin 3, Protein C or Protein S deficiency, Prothrombin mutation)    Negative: Major surgery in the past month    Negative: Hip or leg fracture (broken bone) in past month (or had cast " "on leg or ankle in past month)    Negative: Illness requiring prolonged bedrest in past month (e.g., immobilization, long hospital stay)    Negative: Long-distance travel in past month (e.g., car, bus, train, plane; with trip lasting 6 or more hours)    Negative: Cancer treatment in the past two months (or has cancer now)    Negative: [1] Painful rash AND [2] multiple small blisters grouped together (i.e., dermatomal distribution or \"band\" or \"stripe\")    Negative: Looks like a boil, infected sore, deep ulcer or other infected rash (spreading redness, pus)    Negative: [1] Localized rash is very painful AND [2] no fever    Negative: Numbness in a leg or foot (i.e., loss of sensation)    Negative: Localized pain, redness or hard lump along vein    Negative: Passed out (i.e., lost consciousness, collapsed and was not responding)    Negative: Shock suspected (e.g., cold/pale/clammy skin, too weak to stand, low BP, rapid pulse)    Negative: Difficult to awaken or acting confused (e.g., disoriented, slurred speech)    Negative: Visible sweat on face or sweat dripping down face    Negative: Unable to walk, or can only walk with assistance (e.g., requires support)    Negative: [1] Received SHOCK from implantable cardiac defibrillator AND [2] persisting symptoms (i.e., palpitations, lightheadedness)    Negative: [1] Dizziness, lightheadedness, or weakness AND [2] heart beating very rapidly (e.g., > 140 / minute)    Negative: [1] Dizziness, lightheadedness, or weakness AND [2] heart beating very slowly  (e.g., < 50 / minute)    Negative: Sounds like a life-threatening emergency to the triager    Negative: Chest pain    Negative: Difficulty breathing    Negative: Dizziness, lightheadedness, or weakness    Negative: [1] Heart beating very rapidly (e.g., > 140 / minute) AND [2] present now  (Exception: during exercise)    Negative: Heart beating very slowly (e.g., < 50 / minute)  (Exception: athlete)    Negative: New or " worsened shortness of breath with activity (dyspnea on exertion)    Negative: Patient sounds very sick or weak to the triager    Negative: [1] Heart beating very rapidly (e.g., > 140 / minute) AND [2] not present now  (Exception: during exercise)    Protocols used: HEART RATE AND HEARTBEAT [id number], LEG PAIN-A-AH

## 2022-05-29 NOTE — PROGRESS NOTES
Subjective: Patient reports leg cramps in the last 3 weeks, especially in the morning but randomly during the day even.  Nothing is too unusual in her life but she is in the midst of a lot of anxiety, sweating more, think she notices worse body odor, has an electrolyte drink every day but nothing seems all that out of the ordinary.  She is doing some physical therapy for her back but does not have a lot of super physical activities that she is doing.  Her thyroid was last checked in November.  She has lost some weight    Objective: Thyroid is normal.  Lungs are clear.  Heart is regular without murmurs.  Abdomen benign.  Labs were sent    Assessment and plan: Increase in muscle cramps, often a difficult diagnosis to come up with.  We will check the basic lab tests but if things do not suggest any plan to change things she should follow-up with her primary care.

## 2022-06-04 ASSESSMENT — PATIENT HEALTH QUESTIONNAIRE - PHQ9
SUM OF ALL RESPONSES TO PHQ QUESTIONS 1-9: 10
SUM OF ALL RESPONSES TO PHQ QUESTIONS 1-9: 10
10. IF YOU CHECKED OFF ANY PROBLEMS, HOW DIFFICULT HAVE THESE PROBLEMS MADE IT FOR YOU TO DO YOUR WORK, TAKE CARE OF THINGS AT HOME, OR GET ALONG WITH OTHER PEOPLE: SOMEWHAT DIFFICULT

## 2022-06-10 ENCOUNTER — OFFICE VISIT (OUTPATIENT)
Dept: FAMILY MEDICINE | Facility: CLINIC | Age: 52
End: 2022-06-10
Payer: COMMERCIAL

## 2022-06-10 VITALS
WEIGHT: 133.6 LBS | OXYGEN SATURATION: 98 % | DIASTOLIC BLOOD PRESSURE: 62 MMHG | SYSTOLIC BLOOD PRESSURE: 94 MMHG | HEART RATE: 88 BPM | BODY MASS INDEX: 20.31 KG/M2

## 2022-06-10 DIAGNOSIS — M79.10 MYALGIA: ICD-10-CM

## 2022-06-10 DIAGNOSIS — R53.83 OTHER FATIGUE: Primary | ICD-10-CM

## 2022-06-10 DIAGNOSIS — E03.9 ACQUIRED HYPOTHYROIDISM: ICD-10-CM

## 2022-06-10 LAB
C REACTIVE PROTEIN LHE: <0.1 MG/DL (ref 0–0.8)
CK SERPL-CCNC: 127 U/L (ref 30–190)
ERYTHROCYTE [SEDIMENTATION RATE] IN BLOOD BY WESTERGREN METHOD: 7 MM/HR (ref 0–20)

## 2022-06-10 PROCEDURE — 82550 ASSAY OF CK (CPK): CPT | Performed by: FAMILY MEDICINE

## 2022-06-10 PROCEDURE — 36415 COLL VENOUS BLD VENIPUNCTURE: CPT | Performed by: FAMILY MEDICINE

## 2022-06-10 PROCEDURE — 85652 RBC SED RATE AUTOMATED: CPT | Performed by: FAMILY MEDICINE

## 2022-06-10 PROCEDURE — 86140 C-REACTIVE PROTEIN: CPT | Performed by: FAMILY MEDICINE

## 2022-06-10 PROCEDURE — 99214 OFFICE O/P EST MOD 30 MIN: CPT | Performed by: FAMILY MEDICINE

## 2022-06-10 RX ORDER — LEVOTHYROXINE SODIUM 125 UG/1
125 TABLET ORAL DAILY
Qty: 30 TABLET | Refills: 2 | Status: SHIPPED | OUTPATIENT
Start: 2022-06-10 | End: 2022-09-06

## 2022-06-10 NOTE — PROGRESS NOTES
Assessment and Plan  Carmel has a range of disparate symptoms as noted in her recent [employer] message.  Generally these fell into 3 categories     1) musculoskeletal symptoms, specifically bilateral calf pain and right hamstring tenderness     2) ear nose and throat symptoms: thick nasal mucous, ear popping, dry eyes.  She herself was able to come up with a reasonable explanations for some these, drying effects of trazodone and ocular rosacea.  She has seen ENT in the past.  Discussed allergies as another possible reason for what sounds like eustachian tube dysfunction    3) constitutional: Brain fog and fatigue -notably her TSH is a little high and I did adjust her levothyroxine.  She notes a fair amount of stress.  Also believes fatigue may be due to exposure to fungus in the soil when she works in the garden    4) weight loss /early satiety - recent normal swallowing study - she wonders if exercising less and possibly inadvertently cutting down her portion sizes. Discussed we might consider upper endoscopy in the future.     Gaol today is to rule out other causes of her myalgia and fatigue. Comp panel and Magnesium level normal 5/29/22    Other fatigue/  Myalgia  - Vitamin D Deficiency  - CK total  - CRP, inflammation  - ESR: Erythrocyte sedimentation rate    Acquired hypothyroidism  - levothyroxine (SYNTHROID/LEVOTHROID) 125 MCG tablet  Dispense: 30 tablet; Refill: 2        Return in about 6 weeks (around 7/22/2022), or with PCP when she returns from leave.    Libby Manning MD     -------------------------------------------    Chief concern:     Carmel's recent [employer] message , and the reason that I suggested she come in for an in-person visit    Symptoms for weeks now  R hamstring tightness -   thick, discolored sinus drainage,   papery texture to skin,   feeling tired; general malaise,  brain fog,  increased eye floaters & eye discomfort,   ringing in my ears,  dropping weight (10's being a tight to fitting in  "8's),  MILE has smelled rancid, as well as bowel movements,  Urge to pee increasing & leakage,  Increasing depression.     In case these are due to dehydration, I began drinking saltwater. Today my urge to pee has  been every 2 hours; that may just be the limbic activation. This morning I was feeling okay but mid-morning depression hit. And I had trouble saying what I meant to the . I got home and ate some lunch and the depression lifted substantially, which I haven't experienced before.      I have decreased trazodone for the last few months, mostly just to 22mg, but a few times 12.5 or 17mg, because it contributes to the symptoms of dehydration. I have also contacted my psychiatrist for advice.     The only other change in meds was a step down in estrogen several months ago.    --  TODAY we review  Muscular issues  Hamstring muscle tightness is still there -Carmel has changed her habits a bit - was doing more stretching at the time, not walking as much - but when she does walk  she has noticed tightening of the hamstrings.    Had tightness in calves before, went away - came back. Also in the last month muscles camps in am in her calves - not consistently - sometimes painful, sometimes mild.  Worse if she goes to bathroom in middle of the night her calf cramps get worse,  If she drinks water in the morning, her camps get better.     NOTABLY her last TSH was elevated    ENT symptoms   Ear popping, sometimes mid-day dark brown mucous from nose, dry eyes    She notes she has been taking Trazodone for years - this has caused dryness in sinuses.  Also taking antihistamines for year - she has not felt bothered by allergic symptoms in the last couple of years    She has seen  ENT specialist in the past    I note side effects listed in Up-To-Date for trazodone include    Trazodone - paranasal sinus congestion (?6%)     She notes that she  Has occular Ronase  --    \"General  malaise and kim fog\" - experienced more " "acutely not quite 10 years ago, now having again in the last several months.  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 Her teenaged son is still living there with her ex-. Her other son  lives in town and goes to college - so she does see him.    Notably last TSH was elevated - she is find with an increase in dose of her levothyroxine.. She recalls that she stated at a dose of 137 mcg -then was switching off with a lower dose    She feels that her symptoms may also be the results of \"exposure to mold through gardening,\" - this happened in 2016. She has done some gardening recently , and has been gardening again recently though she is not clear about the onset of symptoms in connection with gardening    She also notes  Tick bite with rash - started taking antibiotics - stopped - then restarted. I did a search on \"tick\" in her chart and the only mention is during  visit in 7/27/2007 - rash was cellulitis (not erythema migrans) however she WAS given doxycyline    Gastroenterology issues    Weight loss - occurred over a few months, starting in  January or February     Wt Readings from Last 5 Encounters:   06/10/22 60.6 kg (133 lb 9.6 oz)   05/29/22 60.3 kg (133 lb)   03/17/22 64 kg (141 lb)   02/24/22 64 kg (141 lb)   12/28/21 68 kg (150 lb)     She notes several possible causes    - exercising less in the winter - muscle mass loss   - IBS   - feeling of early satiety - no abdominal pain or trouble swallowing   - perhaps she has unintentionally cut back her portion sized (eating disorder in the past)    I note recent swallowing study      Narrative & Impression   EXAM: XR GASTROGRAFIN ESOPHAGRAM  LOCATION: [facility]  DATE/TIME: 1/31/2022 7:52 AM     INDICATION: Thickened oropharyngeal phlegm with " "difficulty hearing at times, intermittently blocked eustachian tubes, GERD HRQOL is 1  COMPARISON: None.  TECHNIQUE: Routine esophagram using water-soluble iodinated contrast material.                                                                   IMPRESSION:  1.  Small 2 cm diameter sliding-type esophageal hiatal hernia with a 19 mm B-ring and small volume gastroesophageal reflux into the lower esophagus in the recumbent position.   2.  Trace aspiration into the upper trachea when drinking from a straw in the prone position resulted in immediate effective cough.  3.  Esophagus otherwise normal.       Previously developed a eating disorder \"because I was looking for an answer to my allergies\" - started with avoiding wheat - but then seemed to keep  reactive to more foods, and ultimately  got down to eating 5 foods - got out of that with intensive outpatient therapy challenged her with all the foods    She eats 5 times a day and has IBS.    - she eats oats and pear, melons, raw tomatoes, carrots ximena   - Eating lots of protein   - Still eating bread and wheat and rice    - greek yogurt out, soy products    Takes 2000 international unit(s) Vitamin D daily    Increased urination  - feel this may be due to taking her \"estrogen down  a notch over the last couple of month.\"    ---    9/6/2018 - notes from Dr. Ely (internal medicine, PCP at the time)  \"Reviewed notes from Dr. Payton David and confirmed diagnosis of generalized anxiety disorder , dysthymic disorder, somatic symptom disorder and schizoid personality disorder .\"  ---  Previous visit with me 3/1/22:     Assessment and Plan  Carmel notes onset of a number of symptoms a week after gastrografin esophagram.  She wonders how these are all linked; while not denying this possibility, I am at a loss to explain them all with one diagnosis. Focus on eliminating foods that could cause bloating, and further work up for pelvic pain     Bloating  Discussed " "sticking to stricter FODMAP avoidance for now (hand out given     Urinary urgency  - UA Macro with Reflex to Micro and Culture - lab collect  - Urine Microscopic   Above are normal     Vaginal discharge  - Wet prep - Clinic Collect: WBCs only     Pelvic pain in female - Sense of pressure   Tenderness of uterus - on exam   family history of ovarian cancer - mother. Pelvic exam adnexa normal  - US Pelvic Complete with Transvaginal - this was normal      Current Outpatient Medications   Medication     azelaic acid (FINACIA) 15 % external gel     dicyclomine (BENTYL) 20 MG tablet     hydrocortisone 2.5 % cream     levothyroxine (SYNTHROID/LEVOTHROID) 125 MCG tablet     MAGNESIUM GLYCINATE PO     Omega-3 Fatty Acids (FISH OIL) 1200 MG capsule     progesterone (PROMETRIUM) 100 MG capsule     SYNTHROID 112 MCG tablet     traZODone (DESYREL) 50 MG tablet     UNABLE TO FIND     UNABLE TO FIND     vitamin D3 (CHOLECALCIFEROL) 50 mcg (2000 units) tablet     ASHWAGANDHA PO     azelastine (ASTELIN) 0.1 % nasal spray     ketotifen (ZADITOR) 0.025 % ophthalmic solution     No current facility-administered medications for this visit.         The history section was last reviewed by Libby Tinoco on [place] 10, 2022.    History   Smoking Status     Never Smoker   Smokeless Tobacco     Never Used       Social History    Substance and Sexual Activity      Alcohol use: Not Currently        BP 94/62 (BP Location: Left arm, Patient Position: Sitting, Cuff Size: Adult Regular)   Pulse 88   Wt 60.6 kg (133 lb 9.6 oz)   SpO2 98%   BMI 20.31 kg/m    Body mass index is 20.31 kg/m .     Vitals from other visits   4/21/22:   BP 90/64   Pulse 80   Temp 98.1  F (36.7  C) (Temporal)   Resp 15   Ht 1.727 m (5' 8\")   SpO2 98%   BMI 21.44 kg/m       Visit with me 3/1/22: /60   Pulse 72   SpO2 98%     EXAM:    General appearance - alert, well appearing, and in no distress  Musculoskeletal - no joint tenderness, deformity or swelling, of " legs. Resisted Strength testing normal and normal and normal reflexes  Mental status- constricted affect, anxious with questions, thought process normal though notable tendency list many disparate symptoms and to  attribute symptoms to unconventional causes. Mental state did affect our interaction:      - she was uncomfortable with my more exacting questions about the timing and nature of her individual concerns.  She felt that her primary care provider Dr. Calle-who is a doctor of osteopathy -would take a more holistic approach.  I apologize for any discomfort I was causing her, however since she did bring these all up I was trying simply to better understand how they might or might not fit together     -She recalled a previous visit with me when she was discussing bloating, and I gave her information on FODMAPs.  She said this was counterproductive for her as she already had a tendency to limit her foods and at one point it had an eating disorder.  While I admitted I could not recall her specific conversation, I said that my tendency was to have people compare what they eat on the high FODMAP in the low-fat left side, and simply try eating fewer of the high FODMAP foods rather than make radical changes in her diet     - Tendency to  attribute symptoms to unconventional causes, such as the brain fog as a consequence being exposed to what she described as fungus from dirt while she was gardening. She also noted previous development of an eating disorder whee she felt she was allergic to many food - later tested and shown NOT to be allergic as part of her therapy    Answers for HPI/ROS submitted by the patient on 6/4/2022  If you checked off any problems, how difficult have these problems made it for you to do your work, take care of things at home, or get along with other people?: Somewhat difficult  PH TOTAL SCORE: 10  How many servings of fruits and vegetables do you eat daily?: 4 or more  On average, how many  sweetened beverages do you drink each day (Examples: soda, juice, sweet tea, etc.  Do NOT count diet or artificially sweetened beverages)?: 1  How many minutes a day do you exercise enough to make your heart beat faster?: 30 to 60  How many days a week do you exercise enough to make your heart beat faster?: 3 or less  How many days per week do you miss taking your medication?: 0  What is the reason for your visit today?: muscle tightness & cramping  When did your symptoms begin?: 3-4 weeks ago  What are your symptoms?: R hamstring constant, calves sometimes morning  How would you describe these symptoms?: Mild  Are your symptoms:: Staying the same  Have you had these symptoms before?: Yes  Have you tried or received treatment for these symptoms before?: No  Is there anything that makes you feel worse?: hamstring: walking  Is there anything that makes you feel better?: calves: movement & drinking?

## 2022-06-10 NOTE — Clinical Note
Dayana - probably worth reading all of this - assuming she will connect with you again soon. Lots - maybe - going on.

## 2022-06-12 ENCOUNTER — MYC MEDICAL ADVICE (OUTPATIENT)
Dept: FAMILY MEDICINE | Facility: CLINIC | Age: 52
End: 2022-06-12
Payer: COMMERCIAL

## 2022-06-12 ENCOUNTER — MYC MEDICAL ADVICE (OUTPATIENT)
Dept: SLEEP MEDICINE | Facility: CLINIC | Age: 52
End: 2022-06-12
Payer: COMMERCIAL

## 2022-06-12 DIAGNOSIS — I49.3 PVC'S (PREMATURE VENTRICULAR CONTRACTIONS): Primary | ICD-10-CM

## 2022-06-12 DIAGNOSIS — G47.33 OSA (OBSTRUCTIVE SLEEP APNEA): Primary | ICD-10-CM

## 2022-06-12 DIAGNOSIS — G47.00 INSOMNIA, UNSPECIFIED TYPE: ICD-10-CM

## 2022-06-14 ENCOUNTER — TELEPHONE (OUTPATIENT)
Dept: SLEEP MEDICINE | Facility: CLINIC | Age: 52
End: 2022-06-14
Payer: COMMERCIAL

## 2022-06-15 NOTE — TELEPHONE ENCOUNTER
Arti Escobar MD Winnett, Mary Margaret, MD  Cc: Mukesh Flores RN; Nat Cano, no worries.   She sent me several messages about her symptoms this past weekend.  I can have my  reach out to her and we can get her set up for a week long monitor to see what is going on with her rhythm.     Thanks, EG     Terese - can you get Carmel set up for a 7 day MCOT monitor?     Nat - can you get her in to my clinic, next available. I am guessing that I am booking pretty far out as I don't have much clinic time this summer. Please let her know that we can get the monitor first and have a better idea of what is going on based on that.     Thanks, EG

## 2022-06-16 ENCOUNTER — MYC MEDICAL ADVICE (OUTPATIENT)
Dept: FAMILY MEDICINE | Facility: CLINIC | Age: 52
End: 2022-06-16
Payer: COMMERCIAL

## 2022-06-26 ENCOUNTER — MYC MEDICAL ADVICE (OUTPATIENT)
Dept: FAMILY MEDICINE | Facility: CLINIC | Age: 52
End: 2022-06-26

## 2022-06-27 ENCOUNTER — OFFICE VISIT (OUTPATIENT)
Dept: DERMATOLOGY | Facility: CLINIC | Age: 52
End: 2022-06-27
Payer: COMMERCIAL

## 2022-06-27 DIAGNOSIS — L65.0 TELOGEN EFFLUVIUM: ICD-10-CM

## 2022-06-27 DIAGNOSIS — L65.9 LOSS OF HAIR: Primary | ICD-10-CM

## 2022-06-27 LAB
DEPRECATED CALCIDIOL+CALCIFEROL SERPL-MC: 40 UG/L (ref 20–75)
DEPRECATED CALCIDIOL+CALCIFEROL SERPL-MC: 67 UG/L (ref 20–75)
FERRITIN SERPL-MCNC: 66 NG/ML (ref 8–252)

## 2022-06-27 PROCEDURE — 84425 ASSAY OF VITAMIN B-1: CPT | Mod: 90 | Performed by: PHYSICIAN ASSISTANT

## 2022-06-27 PROCEDURE — 99204 OFFICE O/P NEW MOD 45 MIN: CPT | Performed by: PHYSICIAN ASSISTANT

## 2022-06-27 PROCEDURE — 82627 DEHYDROEPIANDROSTERONE: CPT | Performed by: PHYSICIAN ASSISTANT

## 2022-06-27 PROCEDURE — 36415 COLL VENOUS BLD VENIPUNCTURE: CPT | Performed by: PHYSICIAN ASSISTANT

## 2022-06-27 PROCEDURE — 82728 ASSAY OF FERRITIN: CPT | Performed by: PHYSICIAN ASSISTANT

## 2022-06-27 PROCEDURE — 86376 MICROSOMAL ANTIBODY EACH: CPT | Performed by: PHYSICIAN ASSISTANT

## 2022-06-27 PROCEDURE — 86038 ANTINUCLEAR ANTIBODIES: CPT | Performed by: PHYSICIAN ASSISTANT

## 2022-06-27 PROCEDURE — 82306 VITAMIN D 25 HYDROXY: CPT | Mod: 59 | Performed by: PHYSICIAN ASSISTANT

## 2022-06-27 PROCEDURE — 82306 VITAMIN D 25 HYDROXY: CPT | Performed by: PHYSICIAN ASSISTANT

## 2022-06-27 PROCEDURE — 99000 SPECIMEN HANDLING OFFICE-LAB: CPT | Performed by: PHYSICIAN ASSISTANT

## 2022-06-27 ASSESSMENT — PAIN SCALES - GENERAL: PAINLEVEL: NO PAIN (0)

## 2022-06-27 NOTE — PROGRESS NOTES
HPI:   Chief complaints: Carmel Fischer is a pleasant 51 year old female who presents for evaluation of hair loss. She has had diffuse shedding from her scalp for the past 6 months. She does not endorse any scalp pain or itching. She feels the most thin around her frontal hairline and temples. No fhx of hair loss. She has had a very stressful year. She got a divorce, moved from [place] to [facility] and is starting a new program/[employer]. Additionally her children are in [place] and she feels isolated from them. She also has a history of hypothyroidism but is medicated for this. She has not tried any therapies for her hair loss yet.       PHYSICAL EXAM:    There were no vitals taken for this visit.  Skin exam performed as follows: Type 2 skin. Mood appropriate  Alert and Oriented X 3. Well developed, well nourished in no distress.  General appearance: Normal  Head including face: Normal  Eyes: conjunctiva and lids: Normal  Mouth: Lips, teeth, gums: Normal  Neck: Normal  Cardiovascular: Exam of peripheral vascular system by observation for swelling, varicosities, edema: Normal  Right upper extremity: Normal  Left upper extremity: Normal  Right lower extremity: Normal  Left lower extremity: Normal  Skin: Scalp and body hair: See below    Scalp normal without erythema. Marked thinning around bilateral temples. Decreased density around the frontal and vertex scalp. Negative hair pull test.     ASSESSMENT/PLAN:     1. 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 Discussed a short course of minoxidil - she will think about this option. Reassured that hair loss will likely improve when stress abates.   --Check Ferritin, vitamin D, dheas, thyroid perixodase antibody, B1, Zinc  --Consider minoxidil        Follow-up: 4-6 months  CC:   Scribed By: Alexandra Gordon, MS, PA-C

## 2022-06-27 NOTE — LETTER
6/27/2022         RE: Carmel Fischer  [address]  [place] [address]        Dear Colleague,    Thank you for referring your patient, Carmel Fischer, to the [facility]. Please see a copy of my visit note below.    HPI:   Chief complaints: Carmel Fischer is a pleasant 51 year old female who presents for evaluation of hair loss. She has had diffuse shedding from her scalp for the past 6 months. She does not endorse any scalp pain or itching. She feels the most thin around her frontal hairline and temples. No fhx of hair loss. She has had a very stressful year. She got a divorce, moved from [place] to [facility] and is starting a new program/[employer]. Additionally her children are in [place] and she feels isolated from them. She also has a history of hypothyroidism but is medicated for this. She has not tried any therapies for her hair loss yet.       PHYSICAL EXAM:    There were no vitals taken for this visit.  Skin exam performed as follows: Type 2 skin. Mood appropriate  Alert and Oriented X 3. Well developed, well nourished in no distress.  General appearance: Normal  Head including face: Normal  Eyes: conjunctiva and lids: Normal  Mouth: Lips, teeth, gums: Normal  Neck: Normal  Cardiovascular: Exam of peripheral vascular system by observation for swelling, varicosities, edema: Normal  Right upper extremity: Normal  Left upper extremity: Normal  Right lower extremity: Normal  Left lower extremity: Normal  Skin: Scalp and body hair: See below    Scalp normal without erythema. Marked thinning around bilateral temples. Decreased density around the frontal and vertex scalp. Negative hair pull test.     ASSESSMENT/PLAN:     1. 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 Discussed a short course of minoxidil - she will think about this option. Reassured that hair loss will likely improve when stress abates.    --Check Ferritin, vitamin D, dheas, thyroid perixodase antibody, B1, Zinc  --Consider minoxidil        Follow-up: 4-6 months  CC:   Scribed By: Alexandra Gordon, MS, PA-C          Again, thank you for allowing me to participate in the care of your patient.        Sincerely,        Alexandra Gordon PA-C

## 2022-06-28 ENCOUNTER — HOSPITAL ENCOUNTER (OUTPATIENT)
Dept: CARDIOLOGY | Facility: HOSPITAL | Age: 52
Discharge: HOME | End: 2022-06-28
Attending: INTERNAL MEDICINE
Payer: COMMERCIAL

## 2022-06-28 DIAGNOSIS — I49.3 PVC'S (PREMATURE VENTRICULAR CONTRACTIONS): ICD-10-CM

## 2022-06-28 LAB
ANA SER QL IF: NEGATIVE
DHEA-S SERPL-MCNC: 226 UG/DL (ref 35–430)
THYROPEROXIDASE AB SERPL-ACNC: 19 IU/ML

## 2022-06-28 PROCEDURE — 999N000096 CARDIAC MOBILE TELEMETRY MONITOR

## 2022-07-01 LAB — VIT B1 PYROPHOSHATE BLD-SCNC: 133 NMOL/L

## 2022-07-06 PROCEDURE — 93228 REMOTE 30 DAY ECG REV/REPORT: CPT | Performed by: INTERNAL MEDICINE

## 2022-07-08 ENCOUNTER — OFFICE VISIT (OUTPATIENT)
Dept: DERMATOLOGY | Facility: CLINIC | Age: 52
End: 2022-07-08
Payer: COMMERCIAL

## 2022-07-08 DIAGNOSIS — D22.9 MULTIPLE BENIGN NEVI: Primary | ICD-10-CM

## 2022-07-08 DIAGNOSIS — L70.0 ACNE VULGARIS: ICD-10-CM

## 2022-07-08 DIAGNOSIS — L81.4 SOLAR LENTIGO: ICD-10-CM

## 2022-07-08 DIAGNOSIS — L71.9 ROSACEA: ICD-10-CM

## 2022-07-08 DIAGNOSIS — R20.2 NOTALGIA PARESTHETICA: ICD-10-CM

## 2022-07-08 PROCEDURE — 99214 OFFICE O/P EST MOD 30 MIN: CPT | Performed by: DERMATOLOGY

## 2022-07-08 RX ORDER — AZELAIC ACID 0.15 G/G
GEL TOPICAL
Qty: 50 G | Refills: 11 | Status: SHIPPED | OUTPATIENT
Start: 2022-07-08 | End: 2023-09-06

## 2022-07-08 RX ORDER — PROPRANOLOL HYDROCHLORIDE 10 MG/1
2.5 TABLET ORAL 4 TIMES DAILY
COMMUNITY
End: 2022-09-29

## 2022-07-08 ASSESSMENT — PAIN SCALES - GENERAL: PAINLEVEL: NO PAIN (0)

## 2022-07-08 NOTE — LETTER
7/8/2022       RE: Carmel Fischer  [address]  [place] [address]     Dear Colleague,    Thank you for referring your patient, Carmel Fischer, to the [facility] [place] [facility]. Please see a copy of my visit note below.    [facility] Dermatology Note  Encounter Date: Jul 8, 2022  Office Visit     Dermatology Problem List:  # Telogen effluvium  # Rosacea  - Current tx: azelaic acid 15%  # Acne.  - BP 5% wash  ____________________________________________    Assessment & Plan:    # Rosacea, chronic, well controlled on azelaic acid.  - Continue azelaic acid 15%. Refilled today. Advised if not covered to check [employer] brand at [facility].  - Notify if interested in topical abx    # Acne vulgaris, chronic, active. Mostly blackheads on nose.  - Recommend BP wash daily in shower  - If patient sends message, will send tretinoin 0.025% cream    # Notalgia paresthetica  - Recommended Sarna or CeraVe itch relief moisturizing cream as well as stretching exercises    # Multiple benign nevi.   # Solar lentigines  - No concerning lesions today  - Monitor for ABCDEs of melanoma   - Continue sun protection - recommend SPF 30 or higher with frequent application   - Return sooner if noticing changing or symptomatic lesions     Procedures Performed:   None    Follow-up: 1 year(s) in-person, or earlier for new or changing lesions    Staff and Scribe:     Scribe Disclosure:  I, Samuel Armstrong, am serving as a scribe to document services personally performed by Reyna Gomes MD based on data collection and the provider's statements to me.     Provider Disclosure:   The documentation recorded by the scribe accurately reflects the services I personally performed and the decisions made by me.    Renya Gomes MD    Department of Dermatology  [facility]  [facility]: Phone: [phone], Fax: [phone]  7/8/2022     ____________________________________________    CC: Derm Problem (Carmel is here for a skin check and is concerned about Rosacea on her face and a spot on her back and ears.)    HPI:  Ms. Carmel Fischer is a(n) 51 year old female who presents today as a return patient for [facility]SE. 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    Today, patient reports a spot on her back that she would like examined and an itchy spot on her back. Additionally, she has had bumps and itchiness on her face with sun exposure. She uses azelaic acid which has been helpful. Finally, she reports itchy patches the ears.    Patient is otherwise feeling well, without additional skin concerns.    Labs Reviewed:  N/A    Physical Exam:  Vitals: There were no vitals taken for this visit.  SKIN: Total skin excluding the undergarment areas was performed. The exam included the head/face, neck, both arms, chest, back, abdomen, both legs, digits and/or nails.   - no active rosacea, open comedones on nose  - Multiple regular brown pigmented macules and papules are identified on the trunk and extremities.   - Scattered brown macules on sun exposed areas.  - No other lesions of concern on areas examined.     Medications:  Current Outpatient Medications   Medication     azelaic acid (FINACIA) 15 % external gel     azelastine (ASTELIN) 0.1 % nasal spray     dicyclomine (BENTYL) 20 MG tablet     hydrocortisone 2.5 % cream     ketotifen (ZADITOR) 0.025 % ophthalmic solution     levothyroxine (SYNTHROID/LEVOTHROID) 125 MCG tablet     MAGNESIUM GLYCINATE PO     Omega-3 Fatty Acids (FISH OIL) 1200 MG capsule     progesterone (PROMETRIUM) 100 MG capsule     propranolol (INDERAL) 10 MG tablet     traZODone (DESYREL) 50 MG tablet      UNABLE TO FIND     UNABLE TO FIND     vitamin D3 (CHOLECALCIFEROL) 50 mcg (2000 units) tablet     SYNTHROID 112 MCG tablet     No current facility-administered medications for this visit.      Past Medical History:   Patient Active Problem List   Diagnosis     Female stress incontinence     NAPOLEON (generalized anxiety disorder)     Insomnia     Autonomic dysfunction     SVT (supraventricular tachycardia) (H)     Syncope     MICHAEL (obstructive sleep apnea)     Hypothyroidism     Irritable bowel syndrome     Syringomyelia (H)     Hypoglycemia     Rosacea     Seasonal allergic rhinitis     Bruxism     PVC's (premature ventricular contractions)     Plantar fasciitis     PVD (posterior vitreous detachment), bilateral     Auditory processing disorder     Past Medical History:   Diagnosis Date     Auditory processing disorder 7/26/2021     Autonomic dysfunction 7/26/2021     Bruxism 7/26/2021     Female stress incontinence 9/24/2020     NAPOLEON (generalized anxiety disorder)      Hypoglycemia 7/26/2021     Hypothyroid      Hypothyroidism 7/26/2021     IBS (irritable bowel syndrome)      Insomnia 7/26/2021     Irritable bowel syndrome 7/26/2021     mild MICHAEL (obstructive sleep apnea)      Plantar fasciitis 7/26/2021     PVC's (premature ventricular contractions) 7/26/2021    Just noted on holter     PVD (posterior vitreous detachment), bilateral 7/26/2021     Rosacea 7/26/2021     Seasonal allergic rhinitis 7/26/2021     SVT (supraventricular tachycardia) (H)      Syncope 7/26/2021     Syringomyelia (H) 7/26/2021     Vasovagal reaction         CC No referring provider defined for this encounter. on close of this encounter.

## 2022-07-08 NOTE — PATIENT INSTRUCTIONS
Sarna or CeraVe itch relief moisturizing cream  Notalgia paresthetica stretching exercises    Call me if need more for rosacea, acne, and/or eczema          Start benzoyl peroxide 4-5% wash daily in shower. This can be purchased over the counter at [employer] or [employer] ([employer] makes this product). It can be found in a purple tube in the acne aisle. Be sure to rinse thoroughly with use as it can bleach towels and clothing.      Patient Education     Checking for Skin Cancer  You can find cancer early by checking your skin each month. There are 3 kinds of skin cancer. They are melanoma, basal cell carcinoma, and squamous cell carcinoma. Doing monthly skin checks is the best way to find new marks or skin changes. Follow the instructions below for checking your skin.   The ABCDEs of checking moles for melanoma   Check your moles or growths for signs of melanoma using ABCDE:   Asymmetry: the sides of the mole or growth don t match  Border: the edges are ragged, notched, or blurred  Color: the color within the mole or growth varies  Diameter: the mole or growth is larger than 6 mm (size of a pencil eraser)  Evolving: the size, shape, or color of the mole or growth is changing (evolving is not shown in the images below)    Checking for other types of skin cancer  Basal cell carcinoma or squamous cell carcinoma have symptoms such as:     A spot or mole that looks different from all other marks on your skin  Changes in how an area feels, such as itching, tenderness, or pain  Changes in the skin's surface, such as oozing, bleeding, or scaliness  A sore that does not heal  New swelling or redness beyond the border of a mole    Who s at risk?  Anyone can get skin cancer. But you are at greater risk if you have:   Fair skin, light-colored hair, or light-colored eyes  Many moles or abnormal moles on your skin  A history of sunburns from sunlight or tanning beds  A family history of skin cancer  A history of exposure to  radiation or chemicals  A weakened immune system  If you have had skin cancer in the past, you are at risk for recurring skin cancer.   How to check your skin  Do your monthly skin checkups in front of a full-length mirror. Check all parts of your body, including your:   Head (ears, face, neck, and scalp)  Torso (front, back, and sides)  Arms (tops, undersides, upper, and lower armpits)  Hands (palms, backs, and fingers, including under the nails)  Buttocks and genitals  Legs (front, back, and sides)  Feet (tops, soles, toes, including under the nails, and between toes)  If you have a lot of moles, take digital photos of them each month. Make sure to take photos both up close and from a distance. These can help you see if any moles change over time.   Most skin changes are not cancer. But if you see any changes in your skin, call your doctor right away. Only he or she can diagnose a problem. If you have skin cancer, seeing your doctor can be the first step toward getting the treatment that could save your life.   [employer] last reviewed this educational content on 4/1/2019 2000-2020 [employer]. [address] [place] [address]. All rights reserved. This information is not intended as a substitute for professional medical care. Always follow your healthcare professional's instructions.       When should I call my doctor?  If you are worsening or not improving, please, contact us or seek urgent care as noted below.     Who should I call with questions (adults)?  [facility] (adult and pediatric): [phone]  [facility] (adult): [phone]  For urgent needs outside of business hours call the [facility] at [phone] and ask for the dermatology resident on call to be paged  If this is a medical emergency and you are unable to reach an ER, Call [phone]    Who should I call with questions (pediatric)?  [facility]  Health- Pediatric Dermatology  Dr. Carina Christie, Dr. Raleigh Lopez, Dr. Dayana Wick, JAYLON Sullivan, Dr. Lucy Moss, Dr. Estefania Brito & Dr. Cruzito Roach  Non-urgent nurse triage line; [phone]- Sheeba and Nancy HOLDEN Care Coordinatoranna Porter (/Complex ) [phone]    If you need a prescription refill, please contact your pharmacy. Refills are approved or denied by our Physicians during normal business hours, Monday through Fridays  Per office policy, refills will not be granted if you have not been seen within the past year (or sooner depending on your child's condition)    Scheduling Information:  Pediatric Appointment Scheduling and Call Center [phone]  Radiology Scheduling- [phone]  Sedation Unit Scheduling- [phone]  [place] Scheduling- General [phone]; Pediatric Dermatology [phone]  Main  Services: [phone]  British: [phone]  Emirati: [phone]  Hmong/Chip/Syrian: [phone]  Preadmission Nursing Department Fax Number: [phone] (Fax all pre-operative paperwork to this number)    For urgent matters arising during evenings, weekends, or holidays that cannot wait for normal business hours please call [phone] and ask for the dermatology resident on call to be paged.       Dry Skin    What is dry skin?  Common skin problem  Can be worse during the winter   Affects all ages  Occurs in people with or without other skin problems    What does it look like?  Fine lines in the skin become more visible   Rough feeling skin   Flaky skin  Most common on the arms and legs  Skin can become cracked, especially on the hands and feet    What are some problems caused by dry skin?   Itching  Rubbing or scratching can cause thickened, rough skin patches  Cracks in skin can be painful  Red, itchy, scaly skin (called eczema) can occur  Yellow crusting or pus could be signs of an infection    What causes dry skin?  A  "lack of water in the top layer of the skin  Too much soapy water,  hot water, or harsh chemicals  Aging and sun damage    How do I treat dry skin?  Shower or bathe daily for under ten minutes with lukewarm water and mild soap.  Pat yourself dry with a towel gently and leave your skin slightly damp.  Use moisturizing cream or ointment right away.  Avoid lotions.    What kind of mild soap should I be using?  Camay , Dove , Tone , Neutrogena , Purpose , or Oil of Olay   A non-detergent cleanser, like Cetaphil , can be used.    What should I stay away from?  Scented soaps   Bath oils    What moisturizers should I be using?  Cetaphil Cream,CeraVe Cream, Vanicream, Aquaphilic, Eucerin, Aquaphor, or Vaseline   Always apply after showering or bathing.  Reapply throughout the day, if possible.  If dry skin affects your hands, always reapply after handwashing.    What else should I know?  Using a humidifier during winter months may help.  If dry skin gets worse or if eczema develops, a steroid cream may be needed.     Sunscreen - cerave tinted sunscreen AM    Sunscreen is not recommended for infants under the age of 6 months. Use clothing, shade and sun avoidance for small infants. Sunscreen clothing and hats are also important for people of all ages. Note that [employer]/ is a company based out of [place], [place]! Other good items can be found in [employer] stores and on-line. Some stores such as [employer] and Target carry affordable UPF clothing for children in the summer months. Additionally some detergents including \"Rit Sun Guard\" can be used to make your existing clothes into UPF (sunprotective) clothes temporarily (lasts a few washes in regular detergent)        What does SPF mean?   SPF stands for  Sun Protection Factor  and represents the ability to screen only UVB (burning) rays. UVB rays are mostly blocked in all sunscreens, but only those that contain titanium dioxide, zinc oxide, mexoryl or Parsol 1789 " "(avobenzone) block the UVA spectrum. Zinc oxide is the best of all. Even though a sunscreen is labeled  UVA/UVB Protection  that is not entirely accurate because even partial protection allows this label!     What does \"broad spectrum mean\"?   The best sunscreens protect against all UVB (Burning) rays and UVA (Aging, cAncer, tAnning) rays.     What SPF should I chose?   Aim to get a sunscreen that is at least sun protection factor (SPF) 30, SPF 50 if possible. SPF 15 provides about 92-93% coverage, SPF 30 about 95-97% coverage, and SPF 45 about 98% coverage. That is to say, SPF 30 is not twice as good as SPF 15; think of it as a curve graph. If covering your whole body, you should be using 30grams, or one ounce, which is how much is in one shot glass! That s a THICK layer!     UVA BLOCKERS:   Make sure your sunscreen has one of these active ingredients!   Everything else in the  active ingredients  box of a sunscreen label blocks UVB only.   Zinc Oxide (preferred)   Titanium dioxide   Parsol 1789 (avobenzone)   Mexoryl   Examples of some good sunscreens:  [email or web address].com   Aveeno  Baby Heyburn sunscreens   Cheraw   Blue Lizard   BullFrog   CaliforniaBaby   Coppertone Spectra3   [facility]   Elta   Fallene/TotalBlock   Neutrogena   NoAd   Vanicream   WaterBabies   Sticks work great, like Neutrogena pure and free baby SPF 60 stick   * Note, this list is not meant to be comprehensive, just trying to pull together some names that might be helpful to you. If they are not at a local store, they can be found on-line for order. EACH NAMEBRAND MAKES MULTIPLE TYPES SO YOU STILL HAVE TO CHECK FOR ONE OF THE FOUR INGREDIENTS LISTED IN THE LEFT COLUMN!!!     Good daily facial moisturizers with sunscreen:   [facility] Block Sheer   Anthelios by LaRochePosay   Aveeno Positively Radiant  Oil of Olay Complete Defense for sensitive skin     Sunscreen Recommendations for Sensitive Skin   The following sunscreens may be better for " your child's sensitive skin. The main active ingredients are inert, either titanium dioxide or zinc oxide. These ingredients are less irritating than chemical sunscreens.   1) Aveeno Active Natural Protection Mineral Block Lotion SPF 30   2) Aveeno Baby Natural Protection Face Stick SPF 50+   3) Banana Boat Natural Reflect (baby or kids) SPF 50+   4) Glen [employer]'s Bees Chemical-Free Sunscreen SPF 30   5) Blue Lizard Baby SPF 30+   6) Blue Lizard for Sensitive Skin SPF 30+   7) Cotz Pure SPF 30   8) Cotz Face SPF 40   9) Cotz 20% Zinc SPF 35   10) CVS Sensitive Skin SPF 30   11) CVS Baby Lotion Sunscreen SPF 60+   12) Mustella Broad Spectrum SPF 50+/Mineral Sunscreen Stick   13) Neutrogena Sensitive Skin SPF 30   14) Neutrogena Sensitive Skin SPF 60+   15) PreSun Sensitive Sunblock SPF 28   16) Vanicream Sunscreen for Sensitive Skin SPF 60   17) Walgreen's Sensitive Skin SPF 70   Many local pharmacies and [employer] carry some of these options or you may purchase them online at www.[employer] or www[employer]ocella.     Darker Skin Types & Sunscreen  Patients with darker skin colors tend to like tinted sunscreens better as they appear less chalky on the skin. Many BB Creams are now available but make sure the sunscreen SPF is at least 30! Here are some brands of tinted sunscreens:  Neutrogenia Tinted  EltaMD (sold at [facility])  La Roche Posay Anthelios 60 Ultra Light Sunscreen Fluid  Cerave Sunscreen SPF 50 Face Lotion Invisible Zinc  Clarins UV Plus sunscreen Multiprotection Tint   Jart + Every Sun Day sunscreen  Coppertone ClearlySheer Lotion SPF 50  Up & Up (Target) Sheer Dry-touch Lotion SPF 30  Palmers Cocoa Butter Formula Evertone Suncare Sunscreen Stick SPF 30  Per-fect Beauty skin Perfection Plus with SPF 30  Junetics Pure Energy Brightening Day Cream with Broad Spectrum SPF 50  Clinque SPF 30 Mineral Sunscreen Fluid for Face  Danial Multicorrection 5 in 1 Chest, Neck and Face Cream with SPF 30  Cover  FX Clear Cover Invisible Sunscreen Broad Spectrum SPF 30  Vero Teena Age Perfect Hydra-Nutrition Facial Oil SPF 30  Solbar Shield SPF 40  Tropical Sands All Natural SPF 50

## 2022-07-08 NOTE — NURSING NOTE
Dermatology Rooming Note    Carmel Fischer's goals for this visit include:   Chief Complaint   Patient presents with     Derm Problem     Carmel is here for a skin check and is concerned about Rosacea on her face and a spot on her back and ears.     Roosevelt Mata, EMT

## 2022-07-08 NOTE — PROGRESS NOTES
[facility] Dermatology Note  Encounter Date: Jul 8, 2022  Office Visit     Dermatology Problem List:  # Telogen effluvium  # Rosacea  - Current tx: azelaic acid 15%  # Acne.  - BP 5% wash  ____________________________________________    Assessment & Plan:    # Rosacea, chronic, well controlled on azelaic acid.  - Continue azelaic acid 15%. Refilled today. Advised if not covered to check The Ordinary brand at [facility].  - Notify if interested in topical abx    # Acne vulgaris, chronic, active. Mostly blackheads on nose.  - Recommend BP wash daily in shower  - If patient sends message, will send tretinoin 0.025% cream    # Notalgia paresthetica  - Recommended Sarna or CeraVe itch relief moisturizing cream as well as stretching exercises    # Multiple benign nevi.   # Solar lentigines  - No concerning lesions today  - Monitor for ABCDEs of melanoma   - Continue sun protection - recommend SPF 30 or higher with frequent application   - Return sooner if noticing changing or symptomatic lesions     Procedures Performed:   None    Follow-up: 1 year(s) in-person, or earlier for new or changing lesions    Staff and Scribe:     Scribe Disclosure:  I, Samuel Armstrong, am serving as a scribe to document services personally performed by Reyna Gomes MD based on data collection and the provider's statements to me.     Provider Disclosure:   The documentation recorded by the scribe accurately reflects the services I personally performed and the decisions made by me.    Reyna Gomes MD    Department of Dermatology  [facility]: Phone: [phone], Fax: [phone]  7/8/2022     ____________________________________________    CC: Derm Problem (Carmel is here for a skin check and is concerned about Rosacea on her face and a spot on her back and ears.)    HPI:  Ms. Carmel Fischer is a(n) 51 year old female who  presents today as a return patient for FBSE. 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    Today, patient reports a spot on her back that she would like examined and an itchy spot on her back. Additionally, she has had bumps and itchiness on her face with sun exposure. She uses azelaic acid which has been helpful. Finally, she reports itchy patches the ears.    Patient is otherwise feeling well, without additional skin concerns.    Labs Reviewed:  N/A    Physical Exam:  Vitals: There were no vitals taken for this visit.  SKIN: Total skin excluding the undergarment areas was performed. The exam included the head/face, neck, both arms, chest, back, abdomen, both legs, digits and/or nails.   - no active rosacea, open comedones on nose  - Multiple regular brown pigmented macules and papules are identified on the trunk and extremities.   - Scattered brown macules on sun exposed areas.  - No other lesions of concern on areas examined.     Medications:  Current Outpatient Medications   Medication     azelaic acid (FINACIA) 15 % external gel     azelastine (ASTELIN) 0.1 % nasal spray     dicyclomine (BENTYL) 20 MG tablet     hydrocortisone 2.5 % cream     ketotifen (ZADITOR) 0.025 % ophthalmic solution     levothyroxine (SYNTHROID/LEVOTHROID) 125 MCG tablet     MAGNESIUM GLYCINATE PO     Omega-3 Fatty Acids (FISH OIL) 1200 MG capsule     progesterone (PROMETRIUM) 100 MG capsule     propranolol (INDERAL) 10 MG tablet     traZODone (DESYREL) 50 MG tablet     UNABLE TO FIND     UNABLE TO FIND     vitamin D3 (CHOLECALCIFEROL) 50 mcg (2000 units) tablet     SYNTHROID 112 MCG tablet     No current facility-administered medications for this visit.      Past Medical History:   Patient Active Problem List   Diagnosis     Female stress incontinence     NAPOLEON (generalized anxiety  disorder)     Insomnia     Autonomic dysfunction     SVT (supraventricular tachycardia) (H)     Syncope     MICHAEL (obstructive sleep apnea)     Hypothyroidism     Irritable bowel syndrome     Syringomyelia (H)     Hypoglycemia     Rosacea     Seasonal allergic rhinitis     Bruxism     PVC's (premature ventricular contractions)     Plantar fasciitis     PVD (posterior vitreous detachment), bilateral     Auditory processing disorder     Past Medical History:   Diagnosis Date     Auditory processing disorder 7/26/2021     Autonomic dysfunction 7/26/2021     Bruxism 7/26/2021     Female stress incontinence 9/24/2020     NAPOLEON (generalized anxiety disorder)      Hypoglycemia 7/26/2021     Hypothyroid      Hypothyroidism 7/26/2021     IBS (irritable bowel syndrome)      Insomnia 7/26/2021     Irritable bowel syndrome 7/26/2021     mild MICHAEL (obstructive sleep apnea)      Plantar fasciitis 7/26/2021     PVC's (premature ventricular contractions) 7/26/2021    Just noted on holter     PVD (posterior vitreous detachment), bilateral 7/26/2021     Rosacea 7/26/2021     Seasonal allergic rhinitis 7/26/2021     SVT (supraventricular tachycardia) (H)      Syncope 7/26/2021     Syringomyelia (H) 7/26/2021     Vasovagal reaction         CC No referring provider defined for this encounter. on close of this encounter.

## 2022-07-19 ENCOUNTER — TELEPHONE (OUTPATIENT)
Dept: SLEEP MEDICINE | Facility: CLINIC | Age: 52
End: 2022-07-19

## 2022-07-19 DIAGNOSIS — E03.9 ACQUIRED HYPOTHYROIDISM: Primary | ICD-10-CM

## 2022-07-19 NOTE — TELEPHONE ENCOUNTER
Reason for Call:  Other call back    Detailed comments: Wondering if prior authorization can be done for the sleep study    Phone Number Patient can be reached at: Home number on file [phone] (home)    Best Time: anytime    Can we leave a detailed message on this number? YES    Call taken on 7/19/2022 at 4:05 PM by Etta Jhaveri

## 2022-07-20 NOTE — TELEPHONE ENCOUNTER
Called patient to let her know that [facility] has approved per referral status.     Jesusita Cuellar RN on 7/20/2022 at 9:02 AM

## 2022-07-25 ENCOUNTER — NURSE TRIAGE (OUTPATIENT)
Dept: NURSING | Facility: CLINIC | Age: 52
End: 2022-07-25

## 2022-08-09 ENCOUNTER — OFFICE VISIT (OUTPATIENT)
Dept: SLEEP MEDICINE | Facility: CLINIC | Age: 52
End: 2022-08-09
Attending: INTERNAL MEDICINE
Payer: COMMERCIAL

## 2022-08-09 DIAGNOSIS — G47.33 OSA (OBSTRUCTIVE SLEEP APNEA): ICD-10-CM

## 2022-08-09 PROCEDURE — G0399 HOME SLEEP TEST/TYPE 3 PORTA: HCPCS | Performed by: INTERNAL MEDICINE

## 2022-08-09 NOTE — PROGRESS NOTES
Pt is completing a home sleep test. Pt was instructed on how to put on the Noxturnal T3 device and associated equipment before going to bed and given the opportunity to practice putting it on before leaving the sleep center. Pt was reminded to bring the home sleep test kit back to the center tomorrow, at agreed upon time for download and reporting.     Betsey Malhotra [facility]

## 2022-08-10 ENCOUNTER — DOCUMENTATION ONLY (OUTPATIENT)
Dept: SLEEP MEDICINE | Facility: CLINIC | Age: 52
End: 2022-08-10

## 2022-08-10 ENCOUNTER — MYC MEDICAL ADVICE (OUTPATIENT)
Dept: SLEEP MEDICINE | Facility: CLINIC | Age: 52
End: 2022-08-10

## 2022-08-10 PROBLEM — G47.33 OSA (OBSTRUCTIVE SLEEP APNEA): Status: RESOLVED | Noted: 2021-07-26 | Resolved: 2022-08-10

## 2022-08-10 NOTE — PROGRESS NOTES
[facility] POST-STUDY QUESTIONNAIRE    1. What time did you go to bed?  10:30  2. How long do you think it took to fall asleep?  20 min  3. What time did you wake up to start the day?  6:40  4. Did you get up during the night at all?  yes  5. If you woke up, do you remember approximately what time(s)? 3 am  6. Did you have any difficulty with the equipment?  No  7. Did you us any type of treatment with this study?  Other sleep medication  8. Was the head of the bed elevated? Yes  9. Did you sleep in a recliner?  No  10. Did you stop using CPAP at least 3 days before this test?  NA  11. Any other information you'd like us to know? I have mild obstructive sleep apnea and super ventricular tachycardia    This HSAT was performed using a Noxturnal T3 device which recorded snore, sound, movement activity, body position, nasal pressure, oronasal thermal airflow, pulse, oximetry and both chest and abdominal respiratory effort. HSAT data was restricted to the time patient states they were in bed.     HSAT was scored using 1B 4% hypopnea rule.     [facility]ST AHI (Non-PAT): 3.5  Snoring was reported as intermittent.  Time with SpO2 below 89% was 0 minutes.   Overall signal quality was good     Pt will follow up with sleep provider to determine appropriate therapy.

## 2022-08-10 NOTE — PROCEDURES
"HOME SLEEP STUDY INTERPRETATION        Patient: Carmel Fischer  MRN: [id number]  YOB: 1970  Study Date: 8/9/2022  PCP/Referring Provider: Dayana Calle DO  Ordering Provider: Joan Chung MD         Indications for Home Study: Carmel Fischer is a 51 year old female with a history of insomnia, mild sleep apnea who presents to reassess sleep apnea.    Estimated body mass index is 20.31 kg/m  as calculated from the following:    Height as of 5/29/22: 1.727 m (5' 8\").    Weight as of 6/10/22: 60.6 kg (133 lb 9.6 oz).  Total score - Baggs: 6 (3/17/2022  3:33 PM)        Data: A full night home sleep study was performed recording the standard physiologic parameters including body position, movement, sound, nasal pressure, thermal oral airflow, chest and abdominal movements with respiratory inductance plethysmography, and oxygen saturation by pulse oximetry. Pulse rate was estimated by oximetry recording. This study was considered adequate based on > 4 hours of quality oximetry and respiratory recording. As specified by the AASM Manual for the Scoring of Sleep and Associated events, version 2.3, Rule VIII.D 1B, 4% oxygen desaturation scoring for hypopneas is used as a standard of care on all home sleep apnea testing.        Analysis Time:  461 minutes        Respiration:   Sleep Associated Hypoxemia: sustained hypoxemia was not present. Baseline oxygen saturation was 95%.  Time with saturation less than or equal to 88% was 0 minutes. The lowest oxygen saturation was 90%.   Snoring: Snoring was absent.  Respiratory events: The home study revealed a presence of 22 obstructive apneas and 1 mixed and central apneas. There were 4 hypopneas resulting in a combined apnea/hypopnea index [AHI] of 3.5 events per hour.  AHI was 4.9 per hour supine, NA per hour prone, 0 per hour on left side, and 0 per hour on right side.   Pattern: Excluding events noted above, respiratory rate and pattern was " Normal.      Position: Percent of time spent: supine - 71%, prone - 0%, on left - 3%, on right - 26%.      Heart Rate: By pulse oximetry normal rate was noted.       Assessment:     No significant obstructive sleep apnea.    Sleep associated hypoxemia was not present.    Recommendations:    Consider repeat testing in the sleep lab if high clinical suspicion remains for significant obstructive sleep apnea.    Suggest optimizing sleep hygiene and avoiding sleep deprivation.    Weight management.        Diagnosis Code(s):     Joan Chung MD, August 10, 2022   Diplomate, American Board of Internal Medicine, Sleep Medicine

## 2022-08-11 ENCOUNTER — OFFICE VISIT (OUTPATIENT)
Dept: CARDIOLOGY | Facility: CLINIC | Age: 52
End: 2022-08-11
Payer: COMMERCIAL

## 2022-08-11 VITALS
SYSTOLIC BLOOD PRESSURE: 90 MMHG | BODY MASS INDEX: 20.37 KG/M2 | HEART RATE: 68 BPM | RESPIRATION RATE: 16 BRPM | DIASTOLIC BLOOD PRESSURE: 50 MMHG | WEIGHT: 134 LBS | OXYGEN SATURATION: 96 %

## 2022-08-11 DIAGNOSIS — G90.9 AUTONOMIC DYSFUNCTION: Primary | ICD-10-CM

## 2022-08-11 DIAGNOSIS — I47.10 SVT (SUPRAVENTRICULAR TACHYCARDIA) (H): ICD-10-CM

## 2022-08-11 DIAGNOSIS — I49.3 PVC'S (PREMATURE VENTRICULAR CONTRACTIONS): ICD-10-CM

## 2022-08-11 PROCEDURE — 99215 OFFICE O/P EST HI 40 MIN: CPT | Performed by: INTERNAL MEDICINE

## 2022-08-11 RX ORDER — BUSPIRONE HYDROCHLORIDE 5 MG/1
5 TABLET ORAL DAILY
COMMUNITY
Start: 2022-08-08 | End: 2022-09-29

## 2022-08-11 NOTE — PROGRESS NOTES
HEART CARE ENCOUNTER CONSULTATON NOTE      [facility]  [phone]      Assessment/Recommendations   Assessment/Plan:  Non-sustained atrial tach and PVCs - Suppressed by low dose propranolol (2.5mg) but flair up when it wears off. Much of this seems to be anxiety driven. We discussed trying a low dose of toprol XL instead and she will consider this pending her response to buspar    F/U 12 months       History of Present Illness/Subjective    HPI: Carmel Fischer is a 51 year old female with NAPOLEON, mild MICHAEL, and a history of vasovagal reactions without syncope. Around 2019 she started seeing a cardiologist in [place], Dr. Grant Soto ([phone]). She initially went to see him after an EKG showed an abnormal interval, per her recall. A holter showed SVT and she she had a normal echocardiogram. Carmel had been on propranolol for her anxiety and palpitations but was weaned off as it was making her feel very dry and dehydrated.     Carmel returns for follow up.  Her palpitations have been more bothersome recently and she resumed propranolol. She has been taking 1/4 tablet 4 times per day and can feel her heart racing when a dose is wearing off. She is not exercising as the increased heart rate with exercise triggers her anxiety and forms a viscious cycle. Her psychiatrist is starting buspar to see if they can get her off of the propranolol. She has had some lower blood pressure symptoms and has been trying to stay hydrated. There is no chest pain, pnd/orthopnea, edema, syncope. Recent holter showed short runs of atrial tachycardia and a PVC burden of 1%.       Physical Examination  Review of Systems   Vitals: BP 90/50 (BP Location: Left arm, Patient Position: Sitting, Cuff Size: Adult Regular)   Pulse 68   Resp 16   Wt 60.8 kg (134 lb)   SpO2 96%   BMI 20.37 kg/m    BMI= Body mass index is 20.37 kg/m .  Wt Readings from Last 3 Encounters:   08/11/22 60.8 kg (134 lb)   06/10/22 60.6 kg (133 lb  9.6 oz)   22 60.3 kg (133 lb)       General Appearance:   no distress, thin body habitus   ENT/Mouth: membranes moist, no oral lesions or bleeding gums.      EYES:  no scleral icterus, normal conjunctivae   Neck: no carotid bruits or thyromegaly   Chest/Lungs:   lungs are clear to auscultation, no rales or wheezing, no sternal scar, equal chest wall expansion    Cardiovascular:   Regular. Normal first and second heart sounds with no murmur. No rubs or gallops; the right carotid, radial and posterior tibial pulses are intact and the left carotid, radial and posterior tibial pulses are intact.  Jugular venous pressure is flat, no edema bilaterally    Abdomen:  no organomegaly, masses, bruits, or tenderness; bowel sounds are present   Extremities: no cyanosis or clubbing   Skin: no xanthelasma, warm.    Neurologic: normal  bilateral, no tremors     Psychiatric: alert and oriented x3, calm        Please refer above for cardiac ROS details.        Medical History  Surgical History Family History Social History   Past Medical History:   Diagnosis Date     Auditory processing disorder 2021     Autonomic dysfunction 2021     Bruxism 2021     Female stress incontinence 2020     NAPOLEON (generalized anxiety disorder)      Hypoglycemia 2021     Hypothyroid      Hypothyroidism 2021     IBS (irritable bowel syndrome)      Insomnia 2021     Irritable bowel syndrome 2021     mild MICHAEL (obstructive sleep apnea)      Plantar fasciitis 2021     PVC's (premature ventricular contractions) 2021    Just noted on holter     PVD (posterior vitreous detachment), bilateral 2021     Rosacea 2021     Seasonal allergic rhinitis 2021     SVT (supraventricular tachycardia) (H)      Syncope 2021     Syringomyelia (H) 2021     Vasovagal reaction      Past Surgical History:   Procedure Laterality Date      SECTION       WISDOM TOOTH EXTRACTION       Family  History   Problem Relation Age of Onset     Atrial fibrillation Father      Cerebrovascular Disease Father      Dementia Father      Ovarian Cancer Mother      Hypertension Mother      Sleep Apnea Brother      Myocardial Infarction Maternal Grandmother      Cancer Paternal Grandfather      Colon Cancer No family hx of      Breast Cancer No family hx of         Social History     Socioeconomic History     Marital status:      Spouse name: Not on file     Number of children: Not on file     Years of education: Not on file     Highest education level: Not on file   Occupational History     Occupation: homemaker     Occupation: student:    Tobacco Use     Smoking status: Never Smoker     Smokeless tobacco: Never Used   Substance and Sexual Activity     Alcohol use: Not Currently     Drug use: Not Currently     Sexual activity: Not Currently   Other Topics Concern     Parent/sibling w/ CABG, MI or angioplasty before 65F 55M? Not Asked   Social History Narrative     Not on file     Social Determinants of Health     Financial Resource Strain: Not on file   Food Insecurity: Not on file   Transportation Needs: Not on file   Physical Activity: Not on file   Stress: Not on file   Social Connections: Not on file   Intimate Partner Violence: Not on file   Housing Stability: Not on file           Medications  Allergies   Current Outpatient Medications   Medication Sig Dispense Refill     azelaic acid (FINACIA) 15 % external gel Use 1-2 times daily to face 50 g 11     azelaic acid (FINACIA) 15 % external gel azelaic acid 15 % topical gel   APPLY TO AFFECTED AREA EVERY DAY       azelastine (ASTELIN) 0.1 % nasal spray Spray 1 spray into both nostrils daily as needed       busPIRone (BUSPAR) 5 MG tablet Take 5 mg by mouth daily       ketotifen (ZADITOR) 0.025 % ophthalmic solution Apply 1 drop to eye as needed       levothyroxine (SYNTHROID/LEVOTHROID) 125 MCG tablet Take 1 tablet (125 mcg) by mouth daily SYNTHROID  BRAND NECESSARY 30 tablet 2     MAGNESIUM GLYCINATE PO Take 1 capsule by mouth daily       Omega-3 Fatty Acids (FISH OIL) 1200 MG capsule Take 1,200 mg by mouth daily       progesterone (PROMETRIUM) 100 MG capsule Take 100 mg by mouth daily Takes at dinner       propranolol (INDERAL) 10 MG tablet Take 2.5 mg by mouth 4 times daily       traZODone (DESYREL) 50 MG tablet trazodone 50 mg tablet   TAKE 1/2 TO 1 TABLET BY MOUTH AT BEDTIME       UNABLE TO FIND Take 22.5 mg by mouth daily MEDICATION NAME: Zinc Bisglyconate       UNABLE TO FIND Apply topically 2 times daily MEDICATION NAME: Bi-estrogen (compound) BID       UNABLE TO FIND Take 1 tablet by mouth daily MEDICATION NAME: Zinc & Copper 15mg       vitamin D3 (CHOLECALCIFEROL) 50 mcg (2000 units) tablet Take 50 mcg by mouth daily 2000 units        dicyclomine (BENTYL) 20 MG tablet Take 1 tablet (20 mg) by mouth 4 times daily as needed (Abdominal pain, cramping) (Patient not taking: Reported on 8/11/2022) 30 tablet 0     hydrocortisone 2.5 % cream hydrocortisone 2.5 % topical cream (Patient not taking: Reported on 8/11/2022)         Allergies   Allergen Reactions     Apple      Melon      Oral reaction      Mold      Morphine Nausea and Vomiting     Nsaids Hives     Other Environmental Allergy      Tomato Hives          Lab Results    Chemistry/lipid CBC Cardiac Enzymes/BNP/TSH/INR   Recent Labs   Lab Test 10/13/21  0957   CHOL 199   HDL 57      TRIG 107     Recent Labs   Lab Test 10/13/21  0957        Recent Labs   Lab Test 05/29/22  1134      POTASSIUM 3.9   CHLORIDE 107   CO2 28   GLC 91   BUN 19   CR 0.65   GFRESTIMATED >90   ERICH 8.6  8.6     Recent Labs   Lab Test 05/29/22  1134 10/13/21  0957   CR 0.65 0.78     No results for input(s): A1C in the last 73899 hours.       Recent Labs   Lab Test 10/13/21  0957   WBC 4.8   HGB 14.1   HCT 43.0   MCV 96        Recent Labs   Lab Test 10/13/21  0957   HGB 14.1    No results for input(s):  TROPONINI in the last 13396 hours.  No results for input(s): BNP, NTBNPI, NTBNP in the last 71526 hours.  Recent Labs   Lab Test 05/29/22  1134   TSH 6.22*     No results for input(s): INR in the last 41520 hours.     Today's clinic visit entailed:  Review of prior external note(s) from - [facility] information from epic reviewed  40 minutes spent on the date of the encounter doing chart review, review of outside records, review of test results, interpretation of tests, patient visit and documentation   Provider  Link to [facility] Help Grid     The level of medical decision making during this visit was of moderate complexity.        Arti Escobar MD

## 2022-08-11 NOTE — PATIENT INSTRUCTIONS
It was a pleasure seeing you at [facility] today.        Here are my suggestions for your care:    1. We can try switching propranolol to long acting metoprolol low dose to try to suppress your palpitations without having break through.      Let's meet again in 12 months.    You can always call my nurse Terese Flores RN who is a nurse helping me in the care of my patients. She can be reached at [phone] if you have any questions.    For scheduling, please call my  Nat Cano at [phone].    Thank you again for trusting me with your care. Please feel free to call my office at any time if you have any question or if I can assist you in any way.    Arti Escobar MD  [facility]

## 2022-08-11 NOTE — LETTER
8/11/2022    Dayana Calle, DO  [address] [address]    RE: Carmel Fischer       Dear Colleague,     I had the pleasure of seeing Carmel Fischer in the [facility].    HEART CARE ENCOUNTER CONSULTATON NOTE      [facility]  [phone]      Assessment/Recommendations   Assessment/Plan:  Non-sustained atrial tach and PVCs - Suppressed by low dose propranolol (2.5mg) but flair up when it wears off. Much of this seems to be anxiety driven. We discussed trying a low dose of toprol XL instead and she will consider this pending her response to buspar    F/U 12 months       History of Present Illness/Subjective    HPI: Carmel Fischer is a 51 year old female with NAPOLEON, mild MICHAEL, and a history of vasovagal reactions without syncope. Around 2019 she started seeing a cardiologist in [place], Dr. Grant Soto ([phone]). She initially went to see him after an EKG showed an abnormal interval, per her recall. A holter showed SVT and she she had a normal echocardiogram. Carmel had been on propranolol for her anxiety and palpitations but was weaned off as it was making her feel very dry and dehydrated.     Carmel returns for follow up.  Her palpitations have been more bothersome recently and she resumed propranolol. She has been taking 1/4 tablet 4 times per day and can feel her heart racing when a dose is wearing off. She is not exercising as the increased heart rate with exercise triggers her anxiety and forms a viscious cycle. Her psychiatrist is starting buspar to see if they can get her off of the propranolol. She has had some lower blood pressure symptoms and has been trying to stay hydrated. There is no chest pain, pnd/orthopnea, edema, syncope. Recent holter showed short runs of atrial tachycardia and a PVC burden of 1%.       Physical Examination  Review of Systems   Vitals: BP 90/50 (BP Location: Left arm, Patient Position: Sitting, Cuff Size: Adult  Regular)   Pulse 68   Resp 16   Wt 60.8 kg (134 lb)   SpO2 96%   BMI 20.37 kg/m    BMI= Body mass index is 20.37 kg/m .  Wt Readings from Last 3 Encounters:   08/11/22 60.8 kg (134 lb)   06/10/22 60.6 kg (133 lb 9.6 oz)   05/29/22 60.3 kg (133 lb)       General Appearance:   no distress, thin body habitus   ENT/Mouth: membranes moist, no oral lesions or bleeding gums.      EYES:  no scleral icterus, normal conjunctivae   Neck: no carotid bruits or thyromegaly   Chest/Lungs:   lungs are clear to auscultation, no rales or wheezing, no sternal scar, equal chest wall expansion    Cardiovascular:   Regular. Normal first and second heart sounds with no murmur. No rubs or gallops; the right carotid, radial and posterior tibial pulses are intact and the left carotid, radial and posterior tibial pulses are intact.  Jugular venous pressure is flat, no edema bilaterally    Abdomen:  no organomegaly, masses, bruits, or tenderness; bowel sounds are present   Extremities: no cyanosis or clubbing   Skin: no xanthelasma, warm.    Neurologic: normal  bilateral, no tremors     Psychiatric: alert and oriented x3, calm        Please refer above for cardiac ROS details.        Medical History  Surgical History Family History Social History   Past Medical History:   Diagnosis Date     Auditory processing disorder 7/26/2021     Autonomic dysfunction 7/26/2021     Bruxism 7/26/2021     Female stress incontinence 9/24/2020     NAPOLEON (generalized anxiety disorder)      Hypoglycemia 7/26/2021     Hypothyroid      Hypothyroidism 7/26/2021     IBS (irritable bowel syndrome)      Insomnia 7/26/2021     Irritable bowel syndrome 7/26/2021     mild MICHAEL (obstructive sleep apnea)      Plantar fasciitis 7/26/2021     PVC's (premature ventricular contractions) 7/26/2021    Just noted on holter     PVD (posterior vitreous detachment), bilateral 7/26/2021     Rosacea 7/26/2021     Seasonal allergic rhinitis 7/26/2021     SVT (supraventricular  tachycardia) (H)      Syncope 2021     Syringomyelia (H) 2021     Vasovagal reaction      Past Surgical History:   Procedure Laterality Date      SECTION       WISDOM TOOTH EXTRACTION       Family History   Problem Relation Age of Onset     Atrial fibrillation Father      Cerebrovascular Disease Father      Dementia Father      Ovarian Cancer Mother      Hypertension Mother      Sleep Apnea Brother      Myocardial Infarction Maternal Grandmother      Cancer Paternal Grandfather      Colon Cancer No family hx of      Breast Cancer No family hx of         Social History     Socioeconomic History     Marital status:      Spouse name: Not on file     Number of children: Not on file     Years of education: Not on file     Highest education level: Not on file   Occupational History     Occupation: homemaker     Occupation: student:    Tobacco Use     Smoking status: Never Smoker     Smokeless tobacco: Never Used   Substance and Sexual Activity     Alcohol use: Not Currently     Drug use: Not Currently     Sexual activity: Not Currently   Other Topics Concern     Parent/sibling w/ CABG, MI or angioplasty before 65F 55M? Not Asked   Social History Narrative     Not on file     Social Determinants of Health     Financial Resource Strain: Not on file   Food Insecurity: Not on file   Transportation Needs: Not on file   Physical Activity: Not on file   Stress: Not on file   Social Connections: Not on file   Intimate Partner Violence: Not on file   Housing Stability: Not on file           Medications  Allergies   Current Outpatient Medications   Medication Sig Dispense Refill     azelaic acid (FINACIA) 15 % external gel Use 1-2 times daily to face 50 g 11     azelaic acid (FINACIA) 15 % external gel azelaic acid 15 % topical gel   APPLY TO AFFECTED AREA EVERY DAY       azelastine (ASTELIN) 0.1 % nasal spray Spray 1 spray into both nostrils daily as needed       busPIRone (BUSPAR) 5 MG tablet  Take 5 mg by mouth daily       ketotifen (ZADITOR) 0.025 % ophthalmic solution Apply 1 drop to eye as needed       levothyroxine (SYNTHROID/LEVOTHROID) 125 MCG tablet Take 1 tablet (125 mcg) by mouth daily SYNTHROID BRAND NECESSARY 30 tablet 2     MAGNESIUM GLYCINATE PO Take 1 capsule by mouth daily       Omega-3 Fatty Acids (FISH OIL) 1200 MG capsule Take 1,200 mg by mouth daily       progesterone (PROMETRIUM) 100 MG capsule Take 100 mg by mouth daily Takes at dinner       propranolol (INDERAL) 10 MG tablet Take 2.5 mg by mouth 4 times daily       traZODone (DESYREL) 50 MG tablet trazodone 50 mg tablet   TAKE 1/2 TO 1 TABLET BY MOUTH AT BEDTIME       UNABLE TO FIND Take 22.5 mg by mouth daily MEDICATION NAME: Zinc Bisglyconate       UNABLE TO FIND Apply topically 2 times daily MEDICATION NAME: Bi-estrogen (compound) BID       UNABLE TO FIND Take 1 tablet by mouth daily MEDICATION NAME: Zinc & Copper 15mg       vitamin D3 (CHOLECALCIFEROL) 50 mcg (2000 units) tablet Take 50 mcg by mouth daily 2000 units        dicyclomine (BENTYL) 20 MG tablet Take 1 tablet (20 mg) by mouth 4 times daily as needed (Abdominal pain, cramping) (Patient not taking: Reported on 8/11/2022) 30 tablet 0     hydrocortisone 2.5 % cream hydrocortisone 2.5 % topical cream (Patient not taking: Reported on 8/11/2022)         Allergies   Allergen Reactions     Apple      Melon      Oral reaction      Mold      Morphine Nausea and Vomiting     Nsaids Hives     Other Environmental Allergy      Tomato Hives          Lab Results    Chemistry/lipid CBC Cardiac Enzymes/BNP/TSH/INR   Recent Labs   Lab Test 10/13/21  0957   CHOL 199   HDL 57      TRIG 107     Recent Labs   Lab Test 10/13/21  0957        Recent Labs   Lab Test 05/29/22  1134      POTASSIUM 3.9   CHLORIDE 107   CO2 28   GLC 91   BUN 19   CR 0.65   GFRESTIMATED >90   ERICH 8.6  8.6     Recent Labs   Lab Test 05/29/22  1134 10/13/21  0957   CR 0.65 0.78     No results for  input(s): A1C in the last 97200 hours.       Recent Labs   Lab Test 10/13/21  0957   WBC 4.8   HGB 14.1   HCT 43.0   MCV 96        Recent Labs   Lab Test 10/13/21  0957   HGB 14.1    No results for input(s): TROPONINI in the last 44720 hours.  No results for input(s): BNP, NTBNPI, NTBNP in the last 51073 hours.  Recent Labs   Lab Test 05/29/22  1134   TSH 6.22*     No results for input(s): INR in the last 60298 hours.     Today's clinic visit entailed:  Review of prior external note(s) from - [facility] information from epic reviewed  40 minutes spent on the date of the encounter doing chart review, review of outside records, review of test results, interpretation of tests, patient visit and documentation   Provider  Link to [facility] Help Grid     The level of medical decision making during this visit was of moderate complexity.        Arti Escobar MD            Thank you for allowing me to participate in the care of your patient.      Sincerely,     Arti Escobar MD     [facility]  cc:   No referring provider defined for this encounter.

## 2022-08-19 ENCOUNTER — DOCUMENTATION ONLY (OUTPATIENT)
Dept: OTHER | Facility: CLINIC | Age: 52
End: 2022-08-19

## 2022-08-24 ENCOUNTER — LAB (OUTPATIENT)
Dept: LAB | Facility: CLINIC | Age: 52
End: 2022-08-24
Payer: COMMERCIAL

## 2022-08-24 ENCOUNTER — TELEPHONE (OUTPATIENT)
Dept: FAMILY MEDICINE | Facility: CLINIC | Age: 52
End: 2022-08-24
Payer: COMMERCIAL

## 2022-08-24 DIAGNOSIS — E03.9 ACQUIRED HYPOTHYROIDISM: ICD-10-CM

## 2022-08-24 DIAGNOSIS — Z00.00 ROUTINE HEALTH MAINTENANCE: Primary | ICD-10-CM

## 2022-08-24 LAB
T3FREE SERPL-MCNC: 2.5 PG/ML (ref 2–4.4)
T4 FREE SERPL-MCNC: 1.52 NG/DL (ref 0.9–1.7)
TSH SERPL DL<=0.005 MIU/L-ACNC: 1.19 UIU/ML (ref 0.3–4.2)

## 2022-08-24 PROCEDURE — 36415 COLL VENOUS BLD VENIPUNCTURE: CPT

## 2022-08-24 PROCEDURE — 84439 ASSAY OF FREE THYROXINE: CPT

## 2022-08-24 PROCEDURE — 84481 FREE ASSAY (FT-3): CPT

## 2022-08-24 PROCEDURE — 84443 ASSAY THYROID STIM HORMONE: CPT

## 2022-08-25 ASSESSMENT — PATIENT HEALTH QUESTIONNAIRE - PHQ9
SUM OF ALL RESPONSES TO PHQ QUESTIONS 1-9: 3
SUM OF ALL RESPONSES TO PHQ QUESTIONS 1-9: 3
10. IF YOU CHECKED OFF ANY PROBLEMS, HOW DIFFICULT HAVE THESE PROBLEMS MADE IT FOR YOU TO DO YOUR WORK, TAKE CARE OF THINGS AT HOME, OR GET ALONG WITH OTHER PEOPLE: SOMEWHAT DIFFICULT

## 2022-08-31 ENCOUNTER — OFFICE VISIT (OUTPATIENT)
Dept: FAMILY MEDICINE | Facility: CLINIC | Age: 52
End: 2022-08-31
Payer: COMMERCIAL

## 2022-08-31 DIAGNOSIS — R42 DIZZINESS: ICD-10-CM

## 2022-08-31 DIAGNOSIS — R41.3 MEMORY LOSS: ICD-10-CM

## 2022-08-31 DIAGNOSIS — N32.89 BLADDER SPASMS: ICD-10-CM

## 2022-08-31 DIAGNOSIS — R25.2 MUSCLE CRAMPING: Primary | ICD-10-CM

## 2022-08-31 LAB
ALBUMIN UR-MCNC: NEGATIVE MG/DL
APPEARANCE UR: CLEAR
BACTERIA #/AREA URNS HPF: ABNORMAL /HPF
BILIRUB UR QL STRIP: NEGATIVE
COLOR UR AUTO: YELLOW
GLUCOSE UR STRIP-MCNC: NEGATIVE MG/DL
HGB UR QL STRIP: ABNORMAL
KETONES UR STRIP-MCNC: NEGATIVE MG/DL
LEUKOCYTE ESTERASE UR QL STRIP: NEGATIVE
NITRATE UR QL: NEGATIVE
PH UR STRIP: 6 [PH] (ref 5–8)
RBC #/AREA URNS AUTO: ABNORMAL /HPF
SP GR UR STRIP: 1.01 (ref 1–1.03)
SQUAMOUS #/AREA URNS AUTO: ABNORMAL /LPF
UROBILINOGEN UR STRIP-ACNC: 0.2 E.U./DL
WBC #/AREA URNS AUTO: ABNORMAL /HPF

## 2022-08-31 PROCEDURE — 99214 OFFICE O/P EST MOD 30 MIN: CPT | Performed by: STUDENT IN AN ORGANIZED HEALTH CARE EDUCATION/TRAINING PROGRAM

## 2022-08-31 PROCEDURE — 81001 URINALYSIS AUTO W/SCOPE: CPT | Performed by: STUDENT IN AN ORGANIZED HEALTH CARE EDUCATION/TRAINING PROGRAM

## 2022-08-31 NOTE — PATIENT INSTRUCTIONS
Increase salt intake to 2000 mg (at least daily)     Cheap bar of soap under bed sheet at end of bed for cramps

## 2022-08-31 NOTE — PROGRESS NOTES
Assessment & Plan     Muscle cramping  Patient struggling with muscle cramps in her leg.  Last CMP was normal.  No new activity.  Patient does not seem consume caffeine.  Given her low blood pressure advised increase in salt intake.  Encouraged her to continue to wear compression socks given the increased salt that may cause increased swelling.  Encouraged her to continue gentle walking.  Advised she may try a bar of soap under the edge of the bed.    Memory loss  Patient having some word finding and task management memory issues.  Feel this may be more than just the normal cognitive decline of aging.  Did discuss possible referral to neurology for further work-up.  Patient will let me know she is when she is ready to do this    Dizziness  Patient having some ongoing dizziness.  It patient is on propranolol and blood pressure is fairly low.  Would encourage her to trial increased salt intake.  Advised how she could do this at home.  We will see her back in a couple weeks to see if it is improving    Bladder spasms  Patient mentioned bladder spasms as part of her hydration issue.  Discussed checking UA for blood and bacteria.  Otherwise advised her to continue her pelvic floor exercise she is she is learned in the past.  Medication options are available but she would like to hold off on these  - UA with Microscopic reflex to Culture - lab collect  - UA with Microscopic reflex to Culture - lab collect  - Urine Microscopic    No follow-ups on file.    Dayana Calle, [facility]   Carmel is a 51 year old, presenting for the following health issues:  Recheck Medication, Dizziness (Pt reports dizziness for 2 months), and Musculoskeletal Problem (Pt reports cramping, burning and tingling in both legs for past 9 months but more issues with right leg.)    HPI   Patient presents with 2 main concerns.  And a few updates.    Patient has started back on her BuSpar for  increased panic.  Patient also taking propranolol.  These are prescribed by her psychiatrist.  Patient has touch base with cardiology.  Patient feels this is improving but feels these medications make her dehydrated.    Patient has been struggling with bilateral muscle cramps.  Patient notes them to be in her legs.  She has had 2 incidences where they were really bad but they otherwise are mild.  Patient notes are worse in the morning upon waking.  Patient denies any weakness.  Patient also having some tingling sensations of the thighs.  Patient has noticed an increase in swelling.  Patient denies any new activity.  Patient thinks it may be associated with her hydration status.  Patient drinks around 6 to 8 cups of water daily.  Patient feels her cramps increase her night she has to pee or notices having to urinate more when utilizing compression stocks.  Patient has had an increase in pigmentation on the front of her legs bilaterally.    Patient has been struggling with dizziness and lightheadedness.  Patient's blood pressure 90/50 today.  Discussed salt intake.  Patient notes she generally avoids this.  Patient has question with diagnosis of POTS.  Encourage patient to increase salt intake.  Advised her on the physiology of this.    Patient does have some concern about memory loss.  Patient notes she will repeat stories or statements to friends of hers who will tolerate this.  Patient struggling with some word finding and lists.      Review of Systems   Constitutional, HEENT, cardiovascular, pulmonary, gi and gu systems are negative, except as otherwise noted.      Objective    There were no vitals taken for this visit.  There is no height or weight on file to calculate BMI.  Physical Exam   Constitutional: Awake, alert, cooperative, no apparent distress  Eyes: Lids and lashes normal, sclera clear, conjunctiva normal.  ENT: Normocephalic, without obvious abnormality, atramatic.  Lungs: No increased work of  breathing, good air exchange, clear to auscultation bilaterally, no crackles or wheezing.  Cardiovascular: Regular rate and rhythm, normal S1 and S2, no S3 or S4, and no murmur noted.  Abdomen: Normal bowel sounds, soft, non-distended, non-tender, no masses palpated, no hepatosplenomegally.  Musculoskeletal: No redness, warmth, or swelling of the joints.  Full range of motion noted.   Neurologic: Awake, alert, oriented to name, place and time.  Cranial nerves II-XII are grossly intact              .  ..

## 2022-09-06 DIAGNOSIS — E03.9 ACQUIRED HYPOTHYROIDISM: ICD-10-CM

## 2022-09-06 RX ORDER — LEVOTHYROXINE SODIUM 125 UG/1
125 TABLET ORAL DAILY
Qty: 90 TABLET | Refills: 3 | Status: SHIPPED | OUTPATIENT
Start: 2022-09-06

## 2022-09-06 NOTE — TELEPHONE ENCOUNTER
"Last Written Prescription Date:  6/10/22  Last Fill Quantity: 30,  # refills: 2   Last office visit provider:  8/31/22     Requested Prescriptions   Pending Prescriptions Disp Refills     SYNTHROID 125 MCG tablet [Pharmacy Med Name: SYNTHROID 0.125MG (125MCG) TABLETS] 30 tablet 2     Sig: TAKE 1 TABLET BY MOUTH DAILY       Thyroid Protocol Passed - 9/6/2022 10:28 AM        Passed - Patient is 12 years or older        Passed - Recent (12 mo) or future (30 days) visit within the authorizing provider's specialty     Patient has had an office visit with the authorizing provider or a provider within the authorizing providers department within the previous 12 mos or has a future within next 30 days. See \"Patient Info\" tab in inbasket, or \"Choose Columns\" in Meds & Orders section of the refill encounter.              Passed - Medication is active on med list        Passed - Normal TSH on file in past 12 months     Recent Labs   Lab Test 08/24/22  1022   TSH 1.19              Passed - No active pregnancy on record     If patient is pregnant or has had a positive pregnancy test, please check TSH.          Passed - No positive pregnancy test in past 12 months     If patient is pregnant or has had a positive pregnancy test, please check TSH.               Walt Major RN 09/06/22 10:29 AM  "

## 2022-09-20 ENCOUNTER — PHARMACY VISIT (OUTPATIENT)
Dept: CARDIOLOGY | Facility: HOSPITAL | Age: 52
End: 2022-09-20

## 2022-09-23 PROCEDURE — 90471 IMMUNIZATION ADMIN: CPT | Performed by: STUDENT IN AN ORGANIZED HEALTH CARE EDUCATION/TRAINING PROGRAM

## 2022-09-23 PROCEDURE — 90750 HZV VACC RECOMBINANT IM: CPT | Performed by: STUDENT IN AN ORGANIZED HEALTH CARE EDUCATION/TRAINING PROGRAM

## 2022-09-29 ENCOUNTER — MYC MEDICAL ADVICE (OUTPATIENT)
Dept: FAMILY MEDICINE | Facility: CLINIC | Age: 52
End: 2022-09-29

## 2022-09-29 DIAGNOSIS — R41.89 COGNITIVE DECLINE: Primary | ICD-10-CM

## 2022-10-07 ENCOUNTER — OFFICE VISIT (OUTPATIENT)
Dept: FAMILY MEDICINE | Facility: CLINIC | Age: 52
End: 2022-10-07
Payer: COMMERCIAL

## 2022-10-07 VITALS
WEIGHT: 136 LBS | SYSTOLIC BLOOD PRESSURE: 104 MMHG | DIASTOLIC BLOOD PRESSURE: 60 MMHG | BODY MASS INDEX: 20.68 KG/M2 | OXYGEN SATURATION: 98 % | HEART RATE: 66 BPM

## 2022-10-07 DIAGNOSIS — R11.0 NAUSEA: ICD-10-CM

## 2022-10-07 DIAGNOSIS — T73.0XXS: ICD-10-CM

## 2022-10-07 DIAGNOSIS — R61 DIAPHORESIS: Primary | ICD-10-CM

## 2022-10-07 PROCEDURE — 99213 OFFICE O/P EST LOW 20 MIN: CPT | Performed by: FAMILY MEDICINE

## 2022-10-07 RX ORDER — PROPRANOLOL HYDROCHLORIDE 10 MG/1
TABLET ORAL
COMMUNITY
Start: 2022-07-19 | End: 2023-10-26 | Stop reason: ALTCHOICE

## 2022-10-07 NOTE — PROGRESS NOTES
"  Assessment & Plan     Carmel has a number of somatic concerns: Sweating, palpitations, hunger,eating , body temperature issues, dizziness.  Propanolol helps with the palpitations and possibly helps with sweating, though the latter is less clear.  Her narrative sometimes difficult to follow, and her hypotheses about the cause of her symptoms are hard to disentangle from the symptoms themselves .  She is worried about hypoglycemia.  She has a glucometer but is out of strips so we ordered those today.  She would like to get to the \"root cause\" of her symptoms.    She is particularly concerned by her sweats which she feel are associated with normal bodily functions like full bladder, eating, waking up, position changes.  She also does not understand why she has become more hungry, although this did initially start after 2 days of hard work, her frequent hunger has persisted after she stopped working so hard.  She has tried eating frequent meals but it is not clear that this has entirely helped.    She was previously evaluated by an endocrinologist in [place] for her thyroid issues, and says that endocrinologist also ruled out an adrenal tumor.    TSH   Date Value Ref Range Status   08/24/2022 1.19 0.30 - 4.20 uIU/mL Final   05/29/2022 6.22 (H) 0.40 - 4.00 mU/L Final     Last Comprehensive Metabolic (not sure whether this was fasting, collected at 11:34 am)  Sodium   Date Value Ref Range Status   05/29/2022 139 133 - 144 mmol/L Final     Potassium   Date Value Ref Range Status   05/29/2022 3.9 3.4 - 5.3 mmol/L Final     Chloride   Date Value Ref Range Status   05/29/2022 107 94 - 109 mmol/L Final     Carbon Dioxide (CO2)   Date Value Ref Range Status   05/29/2022 28 20 - 32 mmol/L Final     Anion Gap   Date Value Ref Range Status   05/29/2022 4 3 - 14 mmol/L Final     Glucose   Date Value Ref Range Status   05/29/2022 91 70 - 99 mg/dL Final     Urea Nitrogen   Date Value Ref Range Status   05/29/2022 19 7 - 30 mg/dL " "Final     Creatinine   Date Value Ref Range Status   05/29/2022 0.65 0.52 - 1.04 mg/dL Final     GFR Estimate   Date Value Ref Range Status   05/29/2022 >90 >60 mL/min/1.73m2 Final     Comment:     Effective December 21, 2021 eGFRcr in adults is calculated using the 2021 CKD-EPI creatinine equation which includes age and gender (Toya et al., [place], DOI: 10.1056/DNNOgs9934096)     Calcium   Date Value Ref Range Status   05/29/2022 8.6 8.5 - 10.1 mg/dL Final   05/29/2022 8.6 8.5 - 10.1 mg/dL Final     Diaphoresis / Nausea / Hunger pangs, sequela  - blood glucose (NO BRAND SPECIFIED) test strip  Dispense: 50 strip; Refill: 0  - Adult Endocrinology  Referral      Return in 12 days (on 10/19/2022) for Follow up, with pcp.    Libby Manning MD  [facility]    Ifeanyi Burt is a 51 year old, presenting for the following health issues:  Hypoglycemia (Reactive ) and Follow Up      Hypoglycemia    History of Present Illness       Reason for visit:  Possible blood sugar issues  Symptom onset:  1-2 weeks ago  Symptoms include:  Sweating, irregular&fast heartbeat, dizziness, etc  Symptom intensity:  Moderate  Symptom progression:  Worsening  Had these symptoms before:  Yes  Has tried/received treatment for these symptoms:  Yes  Previous treatment was successful:  No  What makes it worse:  Somatic events, stress  What makes it better:  Food, propranolol    She eats 2-3 servings of fruits and vegetables daily.She consumes 1 sweetened beverage(s) daily.She exercises with enough effort to increase her heart rate 30 to 60 minutes per day.  She exercises with enough effort to increase her heart rate 3 or less days per week.   She is taking medications regularly.     Carmel starts by saying \" I may have to go back to  Propranolol\"  Carmel took propranolol again this winter and  In spring  she stopped, then started again recently . She also notes that the propranolol once made her\" heart " "rate jump,\" so she stopped again on 9/25 but restarted again today because of \"stress sweats. \"  No explanation has been found for these sweats    Also recent tried Buspar - gave her brain fog - stopped and mood changed- being off Buspar took away depression    She says overall she has been feeling better, being more active    I note this recent visit with Cardiologist Dr. Escobar on 8/11/22:  Assessment/Plan:  Non-sustained atrial tach and PVCs - Suppressed by low dose propranolol (2.5mg) but flair up when it wears off. Much of this seems to be anxiety driven. We discussed trying a low dose of toprol XL instead and she will consider this pending her response to buspar     F/U 12 months         History of Present Illness/Subjective    HPI: Carmel Fischer is a 51 year old female with NAPOLEON, mild MICHAEL, and a history of vasovagal reactions without syncope. Around 2019 she started seeing a cardiologist in [place], Dr. Grant Soto ([phone]). She initially went to see him after an EKG showed an abnormal interval, per her recall. A holter showed SVT and she she had a normal echocardiogram. Carmel had been on propranolol for her anxiety and palpitations but was weaned off as it was making her feel very dry and dehydrated.     Carmel returns for follow up.  Her palpitations have been more bothersome recently and she resumed propranolol. She has been taking 1/4 tablet 4 times per day and can feel her heart racing when a dose is wearing off. She is not exercising as the increased heart rate with exercise triggers her anxiety and forms a viscious cycle. Her psychiatrist is starting buspar to see if they can get her off of the propranolol. She has had some lower blood pressure symptoms and has been trying to stay hydrated. There is no chest pain, pnd/orthopnea, edema, syncope. Recent holter showed short runs of atrial tachycardia and a PVC burden of 1%.         I ask Carmel more specifically about her symptoms; she notes " "hunger, anxiety, panic, stress sweats, body temp issues    She recounts that earlier this week on Monday she did a lot of physical work, then the next day went for two long walks and \"my hunger kicked in.\"  Following this, a couple of mornings in a row she got hungry 2 hours after breakfast.  She seemed to need food about every two hours and as snaking in between meals which only helped with  \"extreme hunger.\"    Stress sweating triggered by bodily functions - such as hunger, full bladder, changing position, just waking up from sleep . Her stress sweating symptoms have worsened. These sweats starts last  winter when she first had to go back on propranolol- got better - then went off - they came back fast and hard.     Her sweats, palpitations are waking her up \"through the trazodone.\"    She notes an endocrinologist she had been seeing in [place] was managing  thyroid issues, and also \" checked me for a an adrenal tumor. She adds \"\"I want to find a root cause:      When she starts eating heart rate will go up   - meals are with nuts, soy protein   - heritage flakes   - she thinks smaller more frequent might have helped - is eating more salt   - stress sweating has worsened    Stress sweats started with somatic symptoms: bladder full, BM, eating, hunger, sometimes just waking from sleep      Wt Readings from Last 5 Encounters:   10/07/22 61.7 kg (136 lb)   08/11/22 60.8 kg (134 lb)   06/10/22 60.6 kg (133 lb 9.6 oz)   05/29/22 60.3 kg (133 lb)   03/17/22 64 kg (141 lb)         Objective    /60 (BP Location: Left arm, Patient Position: Sitting, Cuff Size: Adult Regular)   Pulse 66   Wt 61.7 kg (136 lb)   SpO2 98%   BMI 20.68 kg/m    Body mass index is 20.68 kg/m .  Physical Exam   General appearance - alert, well appearing, and in no distress  Mental status - anxious; normal mood, behavior, speech, dress, motor activity; thought processes focused on somatic issues  Chest - clear to auscultation, no wheezes, " rales or rhonchi, symmetric air entry  Abdomen - soft, nontender, nondistended, no masses or organomegaly;

## 2022-10-13 ENCOUNTER — IMMUNIZATION (OUTPATIENT)
Dept: NURSING | Facility: CLINIC | Age: 52
End: 2022-10-13
Payer: COMMERCIAL

## 2022-10-13 PROCEDURE — 90682 RIV4 VACC RECOMBINANT DNA IM: CPT

## 2022-10-13 PROCEDURE — 0124A COVID-19,PF,PFIZER BOOSTER BIVALENT: CPT

## 2022-10-13 PROCEDURE — 91312 COVID-19,PF,PFIZER BOOSTER BIVALENT: CPT

## 2022-10-13 PROCEDURE — 90471 IMMUNIZATION ADMIN: CPT

## 2022-10-19 ENCOUNTER — OFFICE VISIT (OUTPATIENT)
Dept: FAMILY MEDICINE | Facility: CLINIC | Age: 52
End: 2022-10-19
Payer: COMMERCIAL

## 2022-10-19 VITALS
WEIGHT: 133.2 LBS | BODY MASS INDEX: 20.19 KG/M2 | OXYGEN SATURATION: 98 % | DIASTOLIC BLOOD PRESSURE: 54 MMHG | HEART RATE: 70 BPM | SYSTOLIC BLOOD PRESSURE: 92 MMHG | HEIGHT: 68 IN | TEMPERATURE: 96.7 F

## 2022-10-19 DIAGNOSIS — N32.89 BLADDER SPASMS: ICD-10-CM

## 2022-10-19 DIAGNOSIS — R00.2 PALPITATIONS: ICD-10-CM

## 2022-10-19 DIAGNOSIS — Z12.31 ENCOUNTER FOR SCREENING MAMMOGRAM FOR BREAST CANCER: ICD-10-CM

## 2022-10-19 DIAGNOSIS — E60 LOW ZINC LEVEL: ICD-10-CM

## 2022-10-19 DIAGNOSIS — R79.0 LOW SERUM COPPER FOR AGE: ICD-10-CM

## 2022-10-19 DIAGNOSIS — Z00.00 ROUTINE GENERAL MEDICAL EXAMINATION AT A HEALTH CARE FACILITY: Primary | ICD-10-CM

## 2022-10-19 DIAGNOSIS — R25.2 MUSCLE CRAMPING: ICD-10-CM

## 2022-10-19 DIAGNOSIS — E03.9 ACQUIRED HYPOTHYROIDISM: ICD-10-CM

## 2022-10-19 DIAGNOSIS — G47.00 PERSISTENT INSOMNIA: ICD-10-CM

## 2022-10-19 LAB
ERYTHROCYTE [DISTWIDTH] IN BLOOD BY AUTOMATED COUNT: 11.6 % (ref 10–15)
HCT VFR BLD AUTO: 41.2 % (ref 35–47)
HGB BLD-MCNC: 13.5 G/DL (ref 11.7–15.7)
MCH RBC QN AUTO: 31.4 PG (ref 26.5–33)
MCHC RBC AUTO-ENTMCNC: 32.8 G/DL (ref 31.5–36.5)
MCV RBC AUTO: 96 FL (ref 78–100)
PLATELET # BLD AUTO: 173 10E3/UL (ref 150–450)
RBC # BLD AUTO: 4.3 10E6/UL (ref 3.8–5.2)
TSH SERPL DL<=0.005 MIU/L-ACNC: 0.77 UIU/ML (ref 0.3–4.2)
WBC # BLD AUTO: 5.7 10E3/UL (ref 4–11)

## 2022-10-19 PROCEDURE — 99000 SPECIMEN HANDLING OFFICE-LAB: CPT | Performed by: STUDENT IN AN ORGANIZED HEALTH CARE EDUCATION/TRAINING PROGRAM

## 2022-10-19 PROCEDURE — 84630 ASSAY OF ZINC: CPT | Mod: 90 | Performed by: STUDENT IN AN ORGANIZED HEALTH CARE EDUCATION/TRAINING PROGRAM

## 2022-10-19 PROCEDURE — 85027 COMPLETE CBC AUTOMATED: CPT | Performed by: STUDENT IN AN ORGANIZED HEALTH CARE EDUCATION/TRAINING PROGRAM

## 2022-10-19 PROCEDURE — 82525 ASSAY OF COPPER: CPT | Mod: 90 | Performed by: STUDENT IN AN ORGANIZED HEALTH CARE EDUCATION/TRAINING PROGRAM

## 2022-10-19 PROCEDURE — 99396 PREV VISIT EST AGE 40-64: CPT | Performed by: STUDENT IN AN ORGANIZED HEALTH CARE EDUCATION/TRAINING PROGRAM

## 2022-10-19 PROCEDURE — 36415 COLL VENOUS BLD VENIPUNCTURE: CPT | Performed by: STUDENT IN AN ORGANIZED HEALTH CARE EDUCATION/TRAINING PROGRAM

## 2022-10-19 PROCEDURE — 84443 ASSAY THYROID STIM HORMONE: CPT | Performed by: STUDENT IN AN ORGANIZED HEALTH CARE EDUCATION/TRAINING PROGRAM

## 2022-10-19 ASSESSMENT — ENCOUNTER SYMPTOMS
PALPITATIONS: 0
SHORTNESS OF BREATH: 0
NERVOUS/ANXIOUS: 1
SORE THROAT: 0
DIZZINESS: 1
ABDOMINAL PAIN: 0
PARESTHESIAS: 0
HEADACHES: 0
CONSTIPATION: 1
MYALGIAS: 1
BREAST MASS: 0
DIARRHEA: 0
FEVER: 0
JOINT SWELLING: 0
ARTHRALGIAS: 0
FREQUENCY: 0
EYE PAIN: 0
NAUSEA: 0
HEARTBURN: 1
CHILLS: 0
COUGH: 0
WEAKNESS: 0
DYSURIA: 0
HEMATURIA: 0
HEMATOCHEZIA: 0
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
## 2022-10-19 NOTE — PATIENT INSTRUCTIONS
Touch base with estrogen provider about symptoms and risk vs benefits of continuing    1200 mg of calcium daily, if not supplement   Preventive Health Recommendations  Female Ages 50 - 64    Yearly exam: See your health care provider every year in order to  o Review health changes.   o Discuss preventive care.    o Review your medicines if your doctor has prescribed any.      Get a Pap test every three years (unless you have an abnormal result and your provider advises testing more often).    If you get Pap tests with HPV test, you only need to test every 5 years, unless you have an abnormal result.     You do not need a Pap test if your uterus was removed (hysterectomy) and you have not had cancer.    You should be tested each year for STDs (sexually transmitted diseases) if you're at risk.     Have a mammogram every 1 to 2 years.    Have a colonoscopy at age 50, or have a yearly FIT test (stool test). These exams screen for colon cancer.      Have a cholesterol test every 5 years, or more often if advised.    Have a diabetes test (fasting glucose) every three years. If you are at risk for diabetes, you should have this test more often.     If you are at risk for osteoporosis (brittle bone disease), think about having a bone density scan (DEXA).    Shots: Get a flu shot each year. Get a tetanus shot every 10 years.    Nutrition:     Eat at least 5 servings of fruits and vegetables each day.    Eat whole-grain bread, whole-wheat pasta and brown rice instead of white grains and rice.    Get adequate Calcium and Vitamin D.     Lifestyle    Exercise at least 150 minutes a week (30 minutes a day, 5 days a week). This will help you control your weight and prevent disease.    Limit alcohol to one drink per day.    No smoking.     Wear sunscreen to prevent skin cancer.     See your dentist every six months for an exam and cleaning.    See your eye doctor every 1 to 2 years.

## 2022-10-21 LAB
COPPER SERPL-MCNC: 119.6 UG/DL
ZINC SERPL-MCNC: 68.4 UG/DL

## 2022-10-22 ENCOUNTER — HEALTH MAINTENANCE LETTER (OUTPATIENT)
Age: 52
End: 2022-10-22

## 2022-11-25 ENCOUNTER — TELEPHONE (OUTPATIENT)
Dept: CARDIOLOGY | Facility: CLINIC | Age: 52
End: 2022-11-25

## 2022-11-25 NOTE — TELEPHONE ENCOUNTER
"Incoming call and concerns from patient, \"my OVC's/SVT's lately have been more and wondering if  new symptom I am having is something urgent\". Pt requesting a call back to discuss. ZANDRA,Rn   "

## 2022-12-01 ENCOUNTER — LAB (OUTPATIENT)
Dept: LAB | Facility: CLINIC | Age: 52
End: 2022-12-01
Attending: FAMILY MEDICINE
Payer: COMMERCIAL

## 2022-12-01 DIAGNOSIS — Z20.822 CLOSE EXPOSURE TO 2019 NOVEL CORONAVIRUS: ICD-10-CM

## 2022-12-01 LAB — SARS-COV-2 RNA RESP QL NAA+PROBE: NEGATIVE

## 2022-12-01 PROCEDURE — U0003 INFECTIOUS AGENT DETECTION BY NUCLEIC ACID (DNA OR RNA); SEVERE ACUTE RESPIRATORY SYNDROME CORONAVIRUS 2 (SARS-COV-2) (CORONAVIRUS DISEASE [COVID-19]), AMPLIFIED PROBE TECHNIQUE, MAKING USE OF HIGH THROUGHPUT TECHNOLOGIES AS DESCRIBED BY CMS-2020-01-R: HCPCS

## 2022-12-01 PROCEDURE — U0005 INFEC AGEN DETEC AMPLI PROBE: HCPCS

## 2022-12-01 NOTE — PROGRESS NOTES
Chief Complaint: memory loss     History of Present Illness:  Ms. Fischer is a 52 year old right-handed female with MICHAEL, NAPOLEON, hypothyroidism presenting for evaluation of memory problems. She presented to the clinic by herself today.      Ms. Fischer reports that in the last year she forgets why she going to do something.  She has difficulty remembering what she was going to talk about.  She blanks about what is going to talk about next in a conversation.  She forgets where she is.  She mixes up digits of her birthday.  She substitutes words.  In the morning, she gets confused which microwave button to push.  She washes her glasses after a shower, but then she noticed that she had left the water running.  She feels that she has depth perception visually.  She thinks that she is bumping into things.  These symptoms started about 9 months ago.  She is now noticing them more consistently.      She repeats questions in the middle of a conversation, which has been long-standing.  She was diagnosed with auditory processing disorder.      Mood:  She thinks that her mood got worse in last winter; however, feeling better since September.  She has history of panic disorder.  She is doing hormonal replacement therapy.  She has a therapist and a psychiatrist, which she is seeing currently.   Sleep: She is taking trazodone 20mg at night for sleep.  She does not get sedated during the day.  She does not know if she acts out her dreams.      IADLs:   Finances: She manages her own finances.  She pays her bills on time.  Shopping: independent  Driving: No problems, not getting lost  Medications: She sometimes to take the propranolol unless she is a timer.  Medical appointments: she manages them with a calendar.  Household chores: She can operate the microwave, washer, dryer, , vacuum .      Patient Active Problem List   Diagnosis     Female stress incontinence     NAPOLEON (generalized anxiety disorder)     Insomnia      Autonomic dysfunction     SVT (supraventricular tachycardia) (H)     Syncope     Hypothyroidism     Irritable bowel syndrome     Syringomyelia (H)     Hypoglycemia     Rosacea     Seasonal allergic rhinitis     Bruxism     PVC's (premature ventricular contractions)     Plantar fasciitis     PVD (posterior vitreous detachment), bilateral     Auditory processing disorder       Past Medical History:   Diagnosis Date     Auditory processing disorder 2021     Autonomic dysfunction 2021     Bruxism 2021     Female stress incontinence 2020     NAPOLEON (generalized anxiety disorder)      Hypoglycemia 2021     Hypothyroid      Hypothyroidism 2021     IBS (irritable bowel syndrome)      Insomnia 2021     Irritable bowel syndrome 2021     mild MICHAEL (obstructive sleep apnea)      Plantar fasciitis 2021     PVC's (premature ventricular contractions) 2021    Just noted on holter     PVD (posterior vitreous detachment), bilateral 2021     Rosacea 2021     Seasonal allergic rhinitis 2021     SVT (supraventricular tachycardia) (H)      Syncope 2021     Syringomyelia (H) 2021     Vasovagal reaction        Past Surgical History:   Procedure Laterality Date      SECTION       WISDOM TOOTH EXTRACTION         Current Outpatient Medications   Medication Sig Dispense Refill     azelaic acid (FINACIA) 15 % external gel Use 1-2 times daily to face 50 g 11     azelastine (ASTELIN) 0.1 % nasal spray Spray 1 spray into both nostrils daily as needed       blood glucose (NO BRAND SPECIFIED) test strip Use to test blood sugar 2  times daily as needed 50 strip 0     dicyclomine (BENTYL) 20 MG tablet Take 1 tablet (20 mg) by mouth 4 times daily as needed (Abdominal pain, cramping) 30 tablet 0     ketotifen (ZADITOR) 0.025 % ophthalmic solution Apply 1 drop to eye as needed       levothyroxine (SYNTHROID) 125 MCG tablet Take 1 tablet (125 mcg) by mouth daily 90 tablet 3      MAGNESIUM GLYCINATE PO Take 2 capsules by mouth daily       Omega-3 Fatty Acids (FISH OIL) 1200 MG capsule Take 1,200 mg by mouth daily       progesterone (PROMETRIUM) 100 MG capsule Take 100 mg by mouth daily Takes at dinner       propranolol (INDERAL) 10 MG tablet        traZODone (DESYREL) 50 MG tablet trazodone 50 mg tablet   TAKE 1/2 TO 1 TABLET BY MOUTH AT BEDTIME       UNABLE TO FIND MEDICATION NAME: ashwaganda       UNABLE TO FIND Take 22.5 mg by mouth daily MEDICATION NAME: Zinc Bisglyconate       UNABLE TO FIND Apply topically 2 times daily MEDICATION NAME: Bi-estrogen (compound) BID       UNABLE TO FIND Take 1 tablet by mouth daily MEDICATION NAME: Zinc & Copper 15mg       vitamin D3 (CHOLECALCIFEROL) 50 mcg (2000 units) tablet Take 50 mcg by mouth daily 2000 units           Allergies   Allergen Reactions     Apple      Melon      Oral reaction      Mold      Morphine Nausea and Vomiting     Nsaids Hives     Other Environmental Allergy      Tomato Hives       Family History   Problem Relation Age of Onset     Atrial fibrillation Father      Cerebrovascular Disease Father      Dementia Father      Ovarian Cancer Mother      Hypertension Mother      Sleep Apnea Brother      Myocardial Infarction Maternal Grandmother      Cancer Paternal Grandfather      Colon Cancer No family hx of      Breast Cancer No family hx of        Social History     Socioeconomic History     Marital status:      Spouse name: Not on file     Number of children: Not on file     Years of education: Not on file     Highest education level: Not on file   Occupational History     Occupation: homemaker     Occupation: student:    Tobacco Use     Smoking status: Never     Smokeless tobacco: Never   Substance and Sexual Activity     Alcohol use: Not Currently     Drug use: Not Currently     Sexual activity: Not Currently   Other Topics Concern     Parent/sibling w/ CABG, MI or angioplasty before 65F 55M? Not Asked  "  Social History Narrative     Not on file     Social Determinants of Health     Financial Resource Strain: Not on file   Food Insecurity: Not on file   Transportation Needs: Not on file   Physical Activity: Not on file   Stress: Not on file   Social Connections: Not on file   Intimate Partner Violence: Not on file   Housing Stability: Not on file     FHX: Father had dementia due to stroke from Afib.      SHX: Masters degree.  She worked at home.  She moved to [place] 18 months ago.  She was taking classes.  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      General Physical examination:  BP 95/63 (BP Location: Left arm, Patient Position: Sitting, Cuff Size: Adult Regular)   Pulse 67   Temp 98.1  F (36.7  C) (Temporal)   Ht 1.727 m (5' 8\")   Wt 61.2 kg (135 lb)   BMI 20.53 kg/m       General: Ms. Fischer is well appearing and is appropriately groomed and dressed.    Neurological examination:    Mental status: Ms. Fischer  is awake, alert, and appropriate, with fluent speech, no word finding difficulties and no difficulty in answering questions. She is well oriented to time, place and person. Her memory for remote events is intact. Her memory for recent events is normal. Attention and concentration are intact. Her fund of knowledge is fair. No apraxia is noted.  Cranial nerves: . Visual fields are full to confrontation with no visual extinction. Extraocular movements are intact. Smooth pursuit is intact. Saccades are normal in initiation, velocity and amplitude both vertically and horizontally.Facial strength is full bilaterally. Sternocledomastoid and trapezius muscle strength is full bilaterally.  Motor examination: Bulk is normal throughout. Axial and upper and lower extremity tone is normal. No pronator drift is noted. Strength is 5/5 and symmetric throughout. There is no tremor or other involuntary movement.  Sensory examination: Sensation is " intact to vibratory sense and proprioception. There is no cortical sensory loss by graphesthesia, or neglect.  Coordination: Finger-nose-finger and heel-knee-shin testing is normal with no dysmetria bilaterally.  Rapid finger tapping, opening and closing of fist and pronation-supination are not slowed.  Gait: She has an upright and steady stance with normal stride length and arm swing. Toe, heel, and tandem walking is intact. No Romberg's sign is present.    SLUMS:  Total 30/30    Neuropsychological evaluation:  A full report of the neuropsychological battery testing is available separately. Findings are summarized here briefly.    Dr. Payton Oakes 5/7/2018 in [place], [place]  Diagnoses: somatic symptom disorder, generalized anxiety disorder, dysthymia, schizoid personality disorder.    The patient reports that the schizoid personality disorder was only based on this one psychologist, and not confirmed again and not used as a diagnosis.      Labs:  Lab Results   Component Value Date    WBC 5.7 10/19/2022    RBC 4.30 10/19/2022    HGB 13.5 10/19/2022    HCT 41.2 10/19/2022    MCV 96 10/19/2022    MCH 31.4 10/19/2022    MCHC 32.8 10/19/2022    RDW 11.6 10/19/2022     10/19/2022     05/29/2022    POTASSIUM 3.9 05/29/2022    CHLORIDE 107 05/29/2022    CO2 28 05/29/2022    ANIONGAP 4 05/29/2022    GLC 91 05/29/2022    BUN 19 05/29/2022    CR 0.65 05/29/2022    ERICH 8.6 05/29/2022    ERICH 8.6 05/29/2022    TSH 0.77 10/19/2022    T4 1.52 08/24/2022        Imaging:  Results for orders placed or performed during the hospital encounter of 06/28/22   Adult Cardiac Mobile Telemetry Monitor    [facility]  Cardiac Electrophysiology  [address]  [place] [address]   Office: [phone]  Fax: [phone]       Cardiac Electrophysiology  Mobile cardiac telemetry monitor report    Mobile cardiac telemetry monitoring from 6.28/2022 to 7/4/2022 (monitored   duration  5d 20h 55m).  Predominant underlying rhythm was sinus rhythm.    Overall heart rate range 50 to 145bpm, average 80bpm.  Infrequent episodes (3 episodes reported) of nonsustained atrial   tachycardia - longest 8 beats, fastest 145bpm.  No sustained tachyarrhythmias  No atrial fibrillation  There were no pauses noted.  Rare supraventricular ectopic beats (<1%).  Rare premature ventricular contractions (1%).  Symptom triggers (53, palpitations, lightheadedness) correlated to sinus   rhythm 74-126bpm with and without isolated PACs, nonsustained atrial   tachycardia.      Electronically signed by May Barber MD  7/6/2022  10:22 AM             Impression:  Ms. Fischer is a 52 year old right-handed female with history of MICHAEL, NAPOLEON, hypothyroidism, who presents with memory problems.  Her SLUMS score today is 30/30 which is in the normal range.  She likely has memory problems due to mood.  Medications and mild MICHAEL may also be contributing.      Recommendations:  1. Follow up with psychologist/therapist and psychiatrist.  2. Follow up with PCP.  3. Exercise 30 minutes/day.    I spent 90 minutes total today for this visit, which includes face-to-face with the patient, reviewing the chart (neuropsychological testing, MRI images, lab reports, clinical notes, medications), ordering medication, and documentation.

## 2022-12-02 ENCOUNTER — OFFICE VISIT (OUTPATIENT)
Dept: NEUROLOGY | Facility: CLINIC | Age: 52
End: 2022-12-02
Attending: STUDENT IN AN ORGANIZED HEALTH CARE EDUCATION/TRAINING PROGRAM
Payer: COMMERCIAL

## 2022-12-02 VITALS
SYSTOLIC BLOOD PRESSURE: 95 MMHG | HEIGHT: 68 IN | BODY MASS INDEX: 20.46 KG/M2 | HEART RATE: 67 BPM | WEIGHT: 135 LBS | DIASTOLIC BLOOD PRESSURE: 63 MMHG | TEMPERATURE: 98.1 F

## 2022-12-02 DIAGNOSIS — R41.89 COGNITIVE DECLINE: ICD-10-CM

## 2022-12-02 ASSESSMENT — PAIN SCALES - GENERAL: PAINLEVEL: NO PAIN (0)

## 2022-12-02 NOTE — LETTER
12/2/2022     RE: Carmel Fischer  [address]  [place] [address]     Dear Colleague,    Thank you for referring your patient, Carmel Fischer, to the [facility] at [facility]. Please see a copy of my visit note below.    Chief Complaint: memory loss     History of Present Illness:  Ms. Fischer is a 52 year old right-handed female with MICHAEL, NAPOLEON, hypothyroidism presenting for evaluation of memory problems. She presented to the clinic by herself today.      Ms. Fischer reports that in the last year she forgets why she going to do something.  She has difficulty remembering what she was going to talk about.  She blanks about what is going to talk about next in a conversation.  She forgets where she is.  She mixes up digits of her birthday.  She substitutes words.  In the morning, she gets confused which microwave button to push.  She washes her glasses after a shower, but then she noticed that she had left the water running.  She feels that she has depth perception visually.  She thinks that she is bumping into things.  These symptoms started about 9 months ago.  She is now noticing them more consistently.      She repeats questions in the middle of a conversation, which has been long-standing.  She was diagnosed with auditory processing disorder.      Mood:  She thinks that her mood got worse in last winter; however, feeling better since September.  She has history of panic disorder.  She is doing hormonal replacement therapy.  She has a therapist and a psychiatrist, which she is seeing currently.   Sleep: She is taking trazodone 20mg at night for sleep.  She does not get sedated during the day.  She does not know if she acts out her dreams.      IADLs:   Finances: She manages her own finances.  She pays her bills on time.  Shopping: independent  Driving: No problems, not getting lost  Medications: She sometimes to take the propranolol unless she  is a timer.  Medical appointments: she manages them with a calendar.  Household chores: She can operate the microwave, washer, dryer, , vacuum .      Patient Active Problem List   Diagnosis     Female stress incontinence     NAPOLEON (generalized anxiety disorder)     Insomnia     Autonomic dysfunction     SVT (supraventricular tachycardia) (H)     Syncope     Hypothyroidism     Irritable bowel syndrome     Syringomyelia (H)     Hypoglycemia     Rosacea     Seasonal allergic rhinitis     Bruxism     PVC's (premature ventricular contractions)     Plantar fasciitis     PVD (posterior vitreous detachment), bilateral     Auditory processing disorder       Past Medical History:   Diagnosis Date     Auditory processing disorder 2021     Autonomic dysfunction 2021     Bruxism 2021     Female stress incontinence 2020     NAPOLEON (generalized anxiety disorder)      Hypoglycemia 2021     Hypothyroid      Hypothyroidism 2021     IBS (irritable bowel syndrome)      Insomnia 2021     Irritable bowel syndrome 2021     mild MICHAEL (obstructive sleep apnea)      Plantar fasciitis 2021     PVC's (premature ventricular contractions) 2021    Just noted on holter     PVD (posterior vitreous detachment), bilateral 2021     Rosacea 2021     Seasonal allergic rhinitis 2021     SVT (supraventricular tachycardia) (H)      Syncope 2021     Syringomyelia (H) 2021     Vasovagal reaction        Past Surgical History:   Procedure Laterality Date      SECTION       WISDOM TOOTH EXTRACTION         Current Outpatient Medications   Medication Sig Dispense Refill     azelaic acid (FINACIA) 15 % external gel Use 1-2 times daily to face 50 g 11     azelastine (ASTELIN) 0.1 % nasal spray Spray 1 spray into both nostrils daily as needed       blood glucose (NO BRAND SPECIFIED) test strip Use to test blood sugar 2  times daily as needed 50 strip 0     dicyclomine  (BENTYL) 20 MG tablet Take 1 tablet (20 mg) by mouth 4 times daily as needed (Abdominal pain, cramping) 30 tablet 0     ketotifen (ZADITOR) 0.025 % ophthalmic solution Apply 1 drop to eye as needed       levothyroxine (SYNTHROID) 125 MCG tablet Take 1 tablet (125 mcg) by mouth daily 90 tablet 3     MAGNESIUM GLYCINATE PO Take 2 capsules by mouth daily       Omega-3 Fatty Acids (FISH OIL) 1200 MG capsule Take 1,200 mg by mouth daily       progesterone (PROMETRIUM) 100 MG capsule Take 100 mg by mouth daily Takes at dinner       propranolol (INDERAL) 10 MG tablet        traZODone (DESYREL) 50 MG tablet trazodone 50 mg tablet   TAKE 1/2 TO 1 TABLET BY MOUTH AT BEDTIME       UNABLE TO FIND MEDICATION NAME: ashwaganda       UNABLE TO FIND Take 22.5 mg by mouth daily MEDICATION NAME: Zinc Bisglyconate       UNABLE TO FIND Apply topically 2 times daily MEDICATION NAME: Bi-estrogen (compound) BID       UNABLE TO FIND Take 1 tablet by mouth daily MEDICATION NAME: Zinc & Copper 15mg       vitamin D3 (CHOLECALCIFEROL) 50 mcg (2000 units) tablet Take 50 mcg by mouth daily 2000 units           Allergies   Allergen Reactions     Apple      Melon      Oral reaction      Mold      Morphine Nausea and Vomiting     Nsaids Hives     Other Environmental Allergy      Tomato Hives       Family History   Problem Relation Age of Onset     Atrial fibrillation Father      Cerebrovascular Disease Father      Dementia Father      Ovarian Cancer Mother      Hypertension Mother      Sleep Apnea Brother      Myocardial Infarction Maternal Grandmother      Cancer Paternal Grandfather      Colon Cancer No family hx of      Breast Cancer No family hx of        Social History     Socioeconomic History     Marital status:      Spouse name: Not on file     Number of children: Not on file     Years of education: Not on file     Highest education level: Not on file   Occupational History     Occupation: homemaker     Occupation: student: art  "teacher   Tobacco Use     Smoking status: Never     Smokeless tobacco: Never   Substance and Sexual Activity     Alcohol use: Not Currently     Drug use: Not Currently     Sexual activity: Not Currently   Other Topics Concern     Parent/sibling w/ CABG, MI or angioplasty before 65F 55M? Not Asked   Social History Narrative     Not on file     Social Determinants of Health     Financial Resource Strain: Not on file   Food Insecurity: Not on file   Transportation Needs: Not on file   Physical Activity: Not on file   Stress: Not on file   Social Connections: Not on file   Intimate Partner Violence: Not on file   Housing Stability: Not on file     FHX: Father had dementia due to stroke from Afib.      SHX: Masters degree.  She worked at home.  She moved to [place] 18 months ago.  She was taking classes.  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      General Physical examination:  BP 95/63 (BP Location: Left arm, Patient Position: Sitting, Cuff Size: Adult Regular)   Pulse 67   Temp 98.1  F (36.7  C) (Temporal)   Ht 1.727 m (5' 8\")   Wt 61.2 kg (135 lb)   BMI 20.53 kg/m       General: Ms. Fischer is well appearing and is appropriately groomed and dressed.    Neurological examination:    Mental status: Ms. Fischer  is awake, alert, and appropriate, with fluent speech, no word finding difficulties and no difficulty in answering questions. She is well oriented to time, place and person. Her memory for remote events is intact. Her memory for recent events is normal. Attention and concentration are intact. Her fund of knowledge is fair. No apraxia is noted.  Cranial nerves: . Visual fields are full to confrontation with no visual extinction. Extraocular movements are intact. Smooth pursuit is intact. Saccades are normal in initiation, velocity and amplitude both vertically and horizontally.Facial strength is full bilaterally. Sternocledomastoid and " trapezius muscle strength is full bilaterally.  Motor examination: Bulk is normal throughout. Axial and upper and lower extremity tone is normal. No pronator drift is noted. Strength is 5/5 and symmetric throughout. There is no tremor or other involuntary movement.  Sensory examination: Sensation is intact to vibratory sense and proprioception. There is no cortical sensory loss by graphesthesia, or neglect.  Coordination: Finger-nose-finger and heel-knee-shin testing is normal with no dysmetria bilaterally.  Rapid finger tapping, opening and closing of fist and pronation-supination are not slowed.  Gait: She has an upright and steady stance with normal stride length and arm swing. Toe, heel, and tandem walking is intact. No Romberg's sign is present.    SLUMS:  Total 30/30    Neuropsychological evaluation:  A full report of the neuropsychological battery testing is available separately. Findings are summarized here briefly.    Dr. Payton Oakes 5/7/2018 in [place], [place]  Diagnoses: somatic symptom disorder, generalized anxiety disorder, dysthymia, schizoid personality disorder.      Labs:  Lab Results   Component Value Date    WBC 5.7 10/19/2022    RBC 4.30 10/19/2022    HGB 13.5 10/19/2022    HCT 41.2 10/19/2022    MCV 96 10/19/2022    MCH 31.4 10/19/2022    MCHC 32.8 10/19/2022    RDW 11.6 10/19/2022     10/19/2022     05/29/2022    POTASSIUM 3.9 05/29/2022    CHLORIDE 107 05/29/2022    CO2 28 05/29/2022    ANIONGAP 4 05/29/2022    GLC 91 05/29/2022    BUN 19 05/29/2022    CR 0.65 05/29/2022    ERICH 8.6 05/29/2022    ERICH 8.6 05/29/2022    TSH 0.77 10/19/2022    T4 1.52 08/24/2022        Imaging:  Results for orders placed or performed during the hospital encounter of 06/28/22   Adult Cardiac Mobile Telemetry Monitor    [facility]  Cardiac Electrophysiology  [address]  [place] [address]   Office: [phone]  Fax: [phone]       Cardiac  Electrophysiology  Mobile cardiac telemetry monitor report    Mobile cardiac telemetry monitoring from 6.28/2022 to 7/4/2022 (monitored   duration 5d 20h 55m).  Predominant underlying rhythm was sinus rhythm.    Overall heart rate range 50 to 145bpm, average 80bpm.  Infrequent episodes (3 episodes reported) of nonsustained atrial   tachycardia - longest 8 beats, fastest 145bpm.  No sustained tachyarrhythmias  No atrial fibrillation  There were no pauses noted.  Rare supraventricular ectopic beats (<1%).  Rare premature ventricular contractions (1%).  Symptom triggers (53, palpitations, lightheadedness) correlated to sinus   rhythm 74-126bpm with and without isolated PACs, nonsustained atrial   tachycardia.      Electronically signed by May Barber MD  7/6/2022  10:22 AM             Impression:  Ms. Fischer is a 52 year old right-handed female with history of MICHAEL, NAPOLEON, hypothyroidism, who presents with memory problems.  Her SLUMS score today is 30/30 which is in the normal range.  She likely has memory problems due to mood.  Medications and mild MICHAEL may also be contributing.      Recommendations:  1. Follow up with psychologist/therapist and psychiatrist.  2. Follow up with PCP.  3. Exercise 30 minutes/day.    I spent 90 minutes total today for this visit, which includes face-to-face with the patient, reviewing the chart (neuropsychological testing, MRI images, lab reports, clinical notes, medications), ordering medication, and documentation.      Again, thank you for allowing me to participate in the care of your patient.      Sincerely,    Marisa Mandujano MD

## 2022-12-04 ENCOUNTER — MYC MEDICAL ADVICE (OUTPATIENT)
Dept: FAMILY MEDICINE | Facility: CLINIC | Age: 52
End: 2022-12-04

## 2022-12-04 DIAGNOSIS — R68.89 IMPAIRED EXERCISE TOLERANCE: Primary | ICD-10-CM

## 2022-12-04 DIAGNOSIS — F41.9 ANXIETY: ICD-10-CM

## 2022-12-05 ENCOUNTER — MYC MEDICAL ADVICE (OUTPATIENT)
Dept: DERMATOLOGY | Facility: CLINIC | Age: 52
End: 2022-12-05

## 2022-12-05 ENCOUNTER — ANCILLARY PROCEDURE (OUTPATIENT)
Dept: MAMMOGRAPHY | Facility: CLINIC | Age: 52
End: 2022-12-05
Attending: STUDENT IN AN ORGANIZED HEALTH CARE EDUCATION/TRAINING PROGRAM
Payer: COMMERCIAL

## 2022-12-05 DIAGNOSIS — Z12.31 ENCOUNTER FOR SCREENING MAMMOGRAM FOR BREAST CANCER: ICD-10-CM

## 2022-12-05 DIAGNOSIS — L70.0 ACNE VULGARIS: Primary | ICD-10-CM

## 2022-12-05 PROCEDURE — 77063 BREAST TOMOSYNTHESIS BI: CPT | Mod: TC | Performed by: RADIOLOGY

## 2022-12-05 PROCEDURE — 77067 SCR MAMMO BI INCL CAD: CPT | Mod: TC | Performed by: RADIOLOGY

## 2022-12-06 RX ORDER — TRETINOIN 0.25 MG/G
CREAM TOPICAL AT BEDTIME
Qty: 45 G | Refills: 3 | Status: SHIPPED | OUTPATIENT
Start: 2022-12-06 | End: 2023-10-20

## 2022-12-06 NOTE — TELEPHONE ENCOUNTER
"A&P from 7/8/2022 office visit with Dr. Gomes:  \"Assessment & Plan:     # Rosacea, chronic, well controlled on azelaic acid.  - Continue azelaic acid 15%. Refilled today. Advised if not covered to check [employer] brand at [facility].  - Notify if interested in topical abx     # Acne vulgaris, chronic, active. Mostly blackheads on nose.  - Recommend BP wash daily in shower  - If patient sends message, will send tretinoin 0.025% cream     # Notalgia paresthetica  - Recommended Sarna or CeraVe itch relief moisturizing cream as well as stretching exercises\"  "

## 2022-12-08 NOTE — TELEPHONE ENCOUNTER
PC with patient. Informs writer that she sent a [employer] message to [facility], and received response. No further questions or concerns at this time. My chart message reviewed. ZANDRA,RN

## 2022-12-09 ENCOUNTER — HOSPITAL ENCOUNTER (OUTPATIENT)
Dept: PHYSICAL THERAPY | Facility: CLINIC | Age: 52
Setting detail: THERAPIES SERIES
Discharge: HOME | End: 2022-12-09
Attending: STUDENT IN AN ORGANIZED HEALTH CARE EDUCATION/TRAINING PROGRAM
Payer: COMMERCIAL

## 2022-12-09 DIAGNOSIS — R68.89 IMPAIRED EXERCISE TOLERANCE: ICD-10-CM

## 2022-12-09 DIAGNOSIS — F41.9 ANXIETY: ICD-10-CM

## 2022-12-09 PROCEDURE — 97162 PT EVAL MOD COMPLEX 30 MIN: CPT | Mod: GP

## 2022-12-09 PROCEDURE — 97110 THERAPEUTIC EXERCISES: CPT | Mod: GP

## 2022-12-12 NOTE — PROGRESS NOTES
12/09/22 1400   Quick Adds   Type of Visit Initial OP PT Evaluation   General Information   Start of Care Date 12/09/22   Referring Physician Dayana Calle DO   Orders Evaluate and Treat as Indicated   Order Date 12/05/22   Medical Diagnosis Impaired exercise tolerance; Anxiety   Onset of illness/injury or Date of Surgery 12/05/22  (order date)   Surgical/Medical history reviewed Yes   Pertinent history of current problem (include personal factors and/or comorbidities that impact the POC) Pt is 53 y/o female with complicated medical history referred to OP PT d/t panic disorder with exercise. Per referring physician:  Patient continuing to struggle with palpitations, temperature intolerance, increased hunger, and bladder spasms.  Patient is started back on her propranolol at the suggestion of her psychiatrist.  Patient recently saw a integrative professional who was concerned about possible cortisol imbalance.  Patient is seeing the [facility] coming up.  Patient is interested in trialing biofeedback but is unsure where to do this.  Discussed that I can place a referral to trial and see if we have anything in our area.  Patient continues to do EMDR with her current therapist.  Discussed that these symptoms all also may be a little related to menopause.  Patient is continuing hormone replacement therapy with an outside clinic.  Advised her to touch base with this physician to talk about her symptoms. Patient's biggest concern at this time is a muscle fatigue bladder spasm and increased temperature in regulation with exercise or emotional trigger.  Patient also continuing to struggle with brain fog and hunger.  Pt states she was sent to PT to trial as  exposure therapy  for exercise as right now she feels she has developed a negative feedback loop when going on her 1-hour long walks, by the time she gets back to her building she is experiencing panic (although not a full-blown panic attack) and an  urgent bladder. When she stops moving these feelings go away. She has tried pelvic PT in the past for her bladder and found that the mental management helped her the best but did not fully resolve her symptoms. She has tried calming/grounding activities such as meditation in the past but found she was reaching a point that she would be hyper-aware of her breathing then would wake in the night d/t feeling like she couldn t breathe. She also states many years ago she used to be very active but this would cause insomnia, when she reduced duration and intensity of exercise it improved her sleep. Pt states she hopes to get back to being able to exercise (long walks or other aerobic activity) without having these symptoms occur.   Prior level of function comment Hour long walks couple times per week. Did mediation for 2.5 yrs.   Assistive Devices Comments None   Patient/Family Goals Statement Hour long walks without rise in panic or urgency. She would like strategies to flip the switch from panic mode to growth/healing mode.   Fall Risk Screen   Fall screen completed by PT   Have you fallen 2 or more times in the past year? No   Have you fallen and had an injury in the past year? No   Timed Up and Go score (seconds) not tested   Is patient a fall risk? No   Pain   Pain comments Denies   Vitals Signs   Heart Rate 77   SpO2 98   Blood Pressure 96/62   Vital Signs Comments Seated, resting   Cognitive Status Examination   Orientation orientation to person, place and time   Level of Consciousness alert   Follows Commands and Answers Questions 100% of the time   Personal Safety and Judgment intact   Memory intact   Observation   Observation Pt prefers private room to discuss history   Posture   Posture Normal   Range of Motion (ROM)   ROM Comment WFL, not formally assessed   Strength   Strength Comments WFL, not formally assessed   Transfer Skills   Transfer Comments IND   Gait   Gait Comments Walking x10 min on TM with self  control of speed of 2.0 mph, monitring vitals t/o without pt viewing vitals to determine any panic response. DIfficult to replicate in clinic as normally takes pt longer period of time to experience symptoms. Vitals were normal t/o and subjectively no increase in symptoms - HR increased to ~95 bpm and SpO2 initially dropped to 95% then increased to 97%   Gait Special Tests   Gait Special Tests OTHER   Balance   Balance Comments Pt appears very stable statically and dynamically although no special test performed   Sensory Examination   Sensory Perception Comments Denies   Planned Therapy Interventions   Planned Therapy Interventions gait training;neuromuscular re-education;ROM;strengthening;stretching;manual therapy   Clinical Impression   Criteria for Skilled Therapeutic Interventions Met yes, treatment indicated   PT Diagnosis Impaired exercise tolerance   Influenced by the following impairments reports of panic and urgent bladder with long duration aerobic activity   Functional limitations due to impairments imapired ability to exercise w/o symptoms   Clinical Presentation Evolving/Changing   Clinical Presentation Rationale complex medical hisptry, unclear etiology of symptoms   Clinical Decision Making (Complexity) Moderate complexity   Therapy Frequency other (see comments)  (1x every other week)   Predicted Duration of Therapy Intervention (days/wks) 10 weeks   Risk & Benefits of therapy have been explained Yes   Patient, Family & other staff in agreement with plan of care Yes   Clinical Impression Comments Majority of session spent today discussing pts history and her reason and goals for attempting physical therapy. Pt has complicated medical history and would benefit from staying with the same therpapist t/o her care. Plan for pt to attempt similar rehab training for those with POTS as exposure therapy to determine if benefits are seen with this.   Education Assessment   Preferred Learning Style Listening    Barriers to Learning Emotional   GOALS   PT Eval Goals 1;2   Goal 1   Goal Identifier Self management of symptoms   Goal Description Pt will be able to list 3 self management techniques to prevent the onset of symptoms during exercise   Target Date 02/16/23   Goal 2   Goal Identifier Aerobic exercise   Goal Description Pt will be able to list 3 different aerobic/cardio activitiesto engage in for improved participation in physical/mental health   Target Date 02/16/23   Total Evaluation Time   PT Megan, Moderate Complexity Minutes (13815) 35     Seema Escamilla, PT, DPT    Physical Therapist  [email or web address]

## 2022-12-30 ENCOUNTER — OFFICE VISIT (OUTPATIENT)
Dept: DERMATOLOGY | Facility: CLINIC | Age: 52
End: 2022-12-30
Payer: COMMERCIAL

## 2022-12-30 DIAGNOSIS — L64.9 ANDROGENIC ALOPECIA: Primary | ICD-10-CM

## 2022-12-30 PROCEDURE — 99213 OFFICE O/P EST LOW 20 MIN: CPT | Performed by: PHYSICIAN ASSISTANT

## 2022-12-30 ASSESSMENT — PAIN SCALES - GENERAL: PAINLEVEL: NO PAIN (0)

## 2022-12-30 NOTE — PROGRESS NOTES
HPI:   Chief complaints: Carmel Fischer is a pleasant 52 year old female who presents for recheck of telogen effluvium. She has noticed less shedding but is not sure she is getting any regrowth.  Still no scalp pain or itching. She feels her individual hairs are thinner than they have been in the past. She has not been using any treatments.     From initial encounter:  She has had a very stressful year. She got a divorce, moved from [place] to [facility] and is starting a new program/[employer]. Additionally her children are in [place] and she feels isolated from them. History of hypothyroidism which is controlled.       PHYSICAL EXAM:    There were no vitals taken for this visit.  Skin exam performed as follows: Type 2 skin. Mood appropriate  Alert and Oriented X 3. Well developed, well nourished in no distress.  General appearance: Normal  Head including face: Normal  Eyes: conjunctiva and lids: Normal  Mouth: Lips, teeth, gums: Normal  Neck: Normal  Cardiovascular: Exam of peripheral vascular system by observation for swelling, varicosities, edema: Normal  Right upper extremity: Normal  Left upper extremity: Normal  Right lower extremity: Normal  Left lower extremity: Normal  Skin: Scalp and body hair: See below    Scalp normal without erythema. Considerable new growth along the frontal scalp and bilateral temples    ASSESSMENT/PLAN:     1. Telogen effluvium - resolved.   2. Androgenic alopecia - discussed treatment options including minoxidil, finasteride, viviscal, nutrafol. She will think about which she would like to try.           Follow-up: 4-6 months  CC:   Scribed By: Alexandra Gordon, MS, PA-C

## 2022-12-30 NOTE — PATIENT INSTRUCTIONS
For the hair consider:    OTC minoxidil 5% foam or solution - apply daily.   Viviscal - supplement   Nutrafol   Saw palmetto

## 2022-12-30 NOTE — LETTER
12/30/2022         RE: Carmel Fischer  [address]  [place] [address]        Dear Colleague,    Thank you for referring your patient, Carmel Fischer, to the [facility]. Please see a copy of my visit note below.    HPI:   Chief complaints: Carmel Fischer is a pleasant 52 year old female who presents for recheck of telogen effluvium. She has noticed less shedding but is not sure she is getting any regrowth.  Still no scalp pain or itching. She feels her individual hairs are thinner than they have been in the past. She has not been using any treatments.     From initial encounter:  She has had a very stressful year. She got a divorce, moved from [place] to [facility] and is starting a new program/[employer]. Additionally her children are in [place] and she feels isolated from them. History of hypothyroidism which is controlled.       PHYSICAL EXAM:    There were no vitals taken for this visit.  Skin exam performed as follows: Type 2 skin. Mood appropriate  Alert and Oriented X 3. Well developed, well nourished in no distress.  General appearance: Normal  Head including face: Normal  Eyes: conjunctiva and lids: Normal  Mouth: Lips, teeth, gums: Normal  Neck: Normal  Cardiovascular: Exam of peripheral vascular system by observation for swelling, varicosities, edema: Normal  Right upper extremity: Normal  Left upper extremity: Normal  Right lower extremity: Normal  Left lower extremity: Normal  Skin: Scalp and body hair: See below    Scalp normal without erythema. Considerable new growth along the frontal scalp and bilateral temples    ASSESSMENT/PLAN:     1. Telogen effluvium - resolved.   2. Androgenic alopecia - discussed treatment options including minoxidil, finasteride, viviscal, nutrafol. She will think about which she would like to try.           Follow-up: 4-6 months  CC:   Scribed By: Alexandra Gordon, MS, PA-C          Again, thank you for allowing me to  participate in the care of your patient.        Sincerely,        Alexandra Camarena PA-C

## 2023-01-17 ENCOUNTER — HOSPITAL ENCOUNTER (OUTPATIENT)
Dept: PHYSICAL THERAPY | Facility: CLINIC | Age: 53
Setting detail: THERAPIES SERIES
Discharge: HOME | End: 2023-01-17
Attending: STUDENT IN AN ORGANIZED HEALTH CARE EDUCATION/TRAINING PROGRAM
Payer: COMMERCIAL

## 2023-01-17 PROCEDURE — 97110 THERAPEUTIC EXERCISES: CPT | Mod: GP | Performed by: PHYSICAL THERAPIST

## 2023-01-31 ENCOUNTER — HOSPITAL ENCOUNTER (OUTPATIENT)
Dept: PHYSICAL THERAPY | Facility: CLINIC | Age: 53
Setting detail: THERAPIES SERIES
Discharge: HOME | End: 2023-01-31
Attending: STUDENT IN AN ORGANIZED HEALTH CARE EDUCATION/TRAINING PROGRAM
Payer: COMMERCIAL

## 2023-01-31 PROCEDURE — 97110 THERAPEUTIC EXERCISES: CPT | Mod: GP | Performed by: PHYSICAL THERAPIST

## 2023-02-14 ENCOUNTER — HOSPITAL ENCOUNTER (OUTPATIENT)
Dept: PHYSICAL THERAPY | Facility: CLINIC | Age: 53
Setting detail: THERAPIES SERIES
Discharge: HOME | End: 2023-02-14
Attending: STUDENT IN AN ORGANIZED HEALTH CARE EDUCATION/TRAINING PROGRAM
Payer: COMMERCIAL

## 2023-02-14 PROCEDURE — 97110 THERAPEUTIC EXERCISES: CPT | Mod: GP | Performed by: PHYSICAL THERAPIST

## 2023-02-14 NOTE — PROGRESS NOTES
[facility]    OUTPATIENT PHYSICAL THERAPY  PLAN OF TREATMENT FOR OUTPATIENT REHABILITATION AND PROGRESS NOTE           Patient's Last Name, First Name, Carmel Wong Date of Birth  1970   Provider's Name  [facility] Medical Record No.  [id number]    Onset Date  12/5/22 Start of Care Date  12/9/22   Type:     _X_PT   ___OT   ___SLP Medical Diagnosis  Impaired exercise tolerance; Anxiety   PT Diagnosis  Impaired exercise tolerance Plan of Treatment  Frequency/Duration: 1x/week for up to 90 days  (6 visits total)  Certification date from 2/14/23 to 5/15/23     Goals:  Goal Identifier Self management of symptoms   Goal Description Pt will be able to list 3 self management techniques to prevent the onset of symptoms during exercise   Target Date 02/16/23   Date Met  02/14/23   Progress (detail required for progress note): Able to list 3 self management techniques to prevent the onset of symptoms during exercise     Goal Identifier Aerobic exercise   Goal Description Pt will be able to list 3 different aerobic/cardio activities to engage in for improved participation in physical/mental health   Target Date 02/16/23   Date Met  02/14/23   Progress (detail required for progress note): Able to list 3 different aerobic/cardio activities to engage in for improved participation in physical/mental health     Goal Identifier NEW GOAL: Wellness   Goal Description Patient will practice self-rating (or how well you feel) on a scale of 0-10 per the POTS program taking daily scores before and after exercise training and modifty the pgroam to allow recovery if the score is >4 in order to develop healthy exercise habits and improve quality of life.   Target Date     Date Met      Progress (detail required for progress note):       Goal Identifier NEW GOAL: HEP Intensity  "  Goal Description Patient will be able to monitor heart reate and level of exertion during exercise training and state an understanding of age-adjusted HRmax and Rose's RPE scale in order to regulate intensity and develop healthy exericse habits.   Target Date 05/15/23   Date Met      Progress (detail required for progress note):       Beginning/End Dates of Progress Note Reporting Period:  12/9/22 to 2/14/23    Progress Toward Goals:   Progress this reporting period: Has met 2/2 goals    Client Self (Subjective) Report for Progress Note Reporting Period: went snow shoeing Saturday and took long walks Sun/Mon, feeling \"low energy\" this AM and \"may be coming down with a cold\"; did not use HR monitoring or THR with any low intensity cardio exercise this week; receptive to progressing to muscle strengthening on POTS rehab program; reviewed progress with goals and reports able to list 3 self management techniques to prevent the onset of symptoms during exercise including proper hydration, monitoring HR, and resting sufficiently but not always compliant with all techniques; also able to list 3 different aerobic/cardio activities (walking, rowing, biking)to engage in for improved participation in physical/mental health but only compliant with walking to date; receptive to new goals focused on pyschomotor/actions of self-rating wellness and monitoring exercise intensity progressing from cognitive/understandingof principles.       I CERTIFY THE NEED FOR THESE SERVICES FURNISHED UNDER        THIS PLAN OF TREATMENT AND WHILE UNDER MY CARE     (Physician co-signature of this document indicates review and certification of the therapy plan).                Referring Provider: Dayana Calle DO Michael Bradbury, PT    "

## 2023-03-28 ENCOUNTER — HOSPITAL ENCOUNTER (OUTPATIENT)
Dept: PHYSICAL THERAPY | Facility: CLINIC | Age: 53
Setting detail: THERAPIES SERIES
Discharge: HOME | End: 2023-03-28
Attending: STUDENT IN AN ORGANIZED HEALTH CARE EDUCATION/TRAINING PROGRAM
Payer: COMMERCIAL

## 2023-03-28 PROCEDURE — 97110 THERAPEUTIC EXERCISES: CPT | Mod: GP | Performed by: PHYSICAL THERAPIST

## 2023-04-03 NOTE — PROGRESS NOTES
"[facility]    Outpatient Physical Therapy Discharge Note  Patient: Carmel Fischer  : 1970    Beginning/End Dates of Reporting Period:  22 to 3/28/23    Referring Provider: Dayana Calle, DO    Therapy Diagnosis: Impaired exercise tolerance     Client Self Report: still struggling with base hydration, noting increases in HR and mild dizziness with daily activity when not monitoring fluit intake, increased panic attacks of late but managing with medication changes, walking approximately 2x/week but not consistently, monitored HR only 1x; performed HEP a few times but not complete and having no motivation do continue; feels it is time to discharge PT and try something new; receptive to review of revised goals and alternative exericse options.     Outcome Measures (most recent score):  To improve perception of wellness and exercise concepts, NEW GOALS addressing wellness and exercise intensity developed on 23 with patient agreeable to progression at that time but now chooses to discontinue therapy due lack of motivation at this time in life. Provided education on holistic plan to help body function and support recovery including review of core/extremity strength HEP and aerobic training concepts previously provided as well as other modes of exercise such as yoga, Enrique Chi, and breathing classes in accordiance with POTS Rehab Program: revisited revised goals for aerobic exercise and self management of symptoms and educated on importance of follow through at home with hydration, functional movement, and low intensity activity that feels motivated to do; provided guidance towards formal classes for accountability performance consistency; recommended continuing education through other resources and provided information on popular book, \"Recovery: the lost art of Convalescence\" helpful to " post-COVID patients.       Goal Identifier NEW GOAL: Wellness   Goal Description Patient will practice self-rating (or how well you feel) on a scale of 0-10 per the POTS program taking daily scores before and after exercise training and modifty the pgroam to allow recovery if the score is >4 in order to develop healthy exercise habits and improve quality of life.   Target Date 05/15/23   Date Met   NOT MET   Progress (detail required for progress note):  Patient chooses to discontinue therapy.     Goal Identifier NEW GOAL: HEP Intensity   Goal Description Patient will be able to monitor heart reate and level of exertion during exercise training and state an understanding of age-adjusted HRmax and Rose's RPE scale in order to regulate intensity and develop healthy exericse habits.   Target Date 05/15/23   Date Met   NOT MET   Progress (detail required for progress note):  Patient chooses to discontinue therapy.     Plan:  Discharge from therapy.    Discharge: Yes    Reason for Discharge: Patient chooses to discontinue therapy.    Equipment Issued: NA    Discharge Plan: Patient to continue home program and explore other services such as yoga, Enrique Chi, and breathing classes.

## 2023-05-23 ENCOUNTER — TELEPHONE (OUTPATIENT)
Dept: CARDIOLOGY | Facility: CLINIC | Age: 53
End: 2023-05-23
Payer: COMMERCIAL

## 2023-05-23 DIAGNOSIS — I47.10 SVT (SUPRAVENTRICULAR TACHYCARDIA) (H): ICD-10-CM

## 2023-05-23 DIAGNOSIS — I47.10 PAROXYSMAL SUPRAVENTRICULAR TACHYCARDIA (H): Primary | ICD-10-CM

## 2023-05-23 NOTE — TELEPHONE ENCOUNTER
"Incoming call from patient, and LVM requesting return call. \"A couple of days ago my heart rate went up\". Then heart rate went low, gray,kneeled down and went away. ZANDRA,Rn     PC to patient, no answer, LVM to return call. ZANDRA,Rn   "

## 2023-05-23 NOTE — TELEPHONE ENCOUNTER
"Dr. Escobar, please review. Pt with a short-lived episode. Any recs at this time? Or continue to monitor? CMM,Rn   _____________________________________________________________  Return incoming call from patient. Reporting concerning episode.    Saturday over the weekend, sitting suddenly stood up, the \"gray feeling\", literally her vision going gray, lasting a few seconds, really brief. No head rush. Denies chest pain, tightness or squeeze. Denies nausea. No other pain or symptoms with this episode. In the moment when it happened, she couldn't feel her heart beat.     Pt notes that her day-to-day HR are running higher, in the 90's. 94 after breakfast, physician this afternoon around 1400 HR 74. Past HR was 60.   Noting in the early/mid-afternoon HR in the 70's.  Recently went off propranolol, expericiencing many more episodes of PVC's and SVT since. Multiple times throughout the days. Trigger skipped beats by bending over, or feeling PVC's frequently.  However since being off the propranolol, not always wondering need to take a dose. Reduction in worry  being off the medication. She reports, that her mind feels clearer and less foggy. She discontinued propranolol on 5/8/23.     Recent medication changes in the past month, Estrogen compounded Testosterone cream, 50/50 0.5/1mg/gram, 2 clicks of the tube, once per day. No other medication adjustments. Will send update to provider. Reassurance and encouraged to make slow position changes. Encouraged proper hydration. Will route to EMG, and make return call with any further recs once obtained. NAVIN DE PAZ   "

## 2023-05-24 NOTE — TELEPHONE ENCOUNTER
PC to patient and review of recommendation. Order placed. Informed pt scheduling will call to arrange. Pt verbalized understanding. ZANDRA,Rn

## 2023-05-24 NOTE — TELEPHONE ENCOUNTER
===View-only below this line===  ----- Message -----  From: Arti Escobar MD  Sent: 5/24/2023   3:06 PM CDT  To: Mukesh Flores RN    2 week event recorder please.     EG

## 2023-05-25 ENCOUNTER — TELEPHONE (OUTPATIENT)
Dept: CARDIOLOGY | Facility: CLINIC | Age: 53
End: 2023-05-25
Payer: COMMERCIAL

## 2023-05-25 NOTE — TELEPHONE ENCOUNTER
From: Arti Escobar MD  Sent: 5/25/2023  10:00 AM CDT  To: Namrata Rex    Let's get it on now as she is having symptoms now. EG

## 2023-05-25 NOTE — TELEPHONE ENCOUNTER
[facility] Call Center    Phone Message    May a detailed message be left on voicemail: yes     Reason for Call: Other: Pt calling in stating she is going on a trip from 06/03/23-06/17/23 and scheduled for the monitor to be placed on 06/20/23 and wanted to confirm that it is ok to wait that long. If you would like her to wear it during the trip she could try and make that work. Please return call to discuss.      Action Taken: Other: Cardiology    Travel Screening: Not Applicable     Thank you!  [facility]

## 2023-05-25 NOTE — TELEPHONE ENCOUNTER
Spoke with Pt regarding recommendations from Dr. Escobar. Pt in agreement, was not able to transfer to scheduling due to Pt's schedule . Confirmed scheduling number. No further questions or concerns noted-LONNIE

## 2023-05-31 ENCOUNTER — HOSPITAL ENCOUNTER (OUTPATIENT)
Dept: CARDIOLOGY | Facility: HOSPITAL | Age: 53
Discharge: HOME | End: 2023-05-31
Attending: INTERNAL MEDICINE | Admitting: INTERNAL MEDICINE
Payer: COMMERCIAL

## 2023-05-31 DIAGNOSIS — I47.10 PAROXYSMAL SUPRAVENTRICULAR TACHYCARDIA (H): ICD-10-CM

## 2023-05-31 DIAGNOSIS — I47.10 SVT (SUPRAVENTRICULAR TACHYCARDIA) (H): ICD-10-CM

## 2023-05-31 PROCEDURE — 93270 REMOTE 30 DAY ECG REV/REPORT: CPT

## 2023-06-02 ENCOUNTER — OFFICE VISIT (OUTPATIENT)
Dept: URGENT CARE | Facility: URGENT CARE | Age: 53
End: 2023-06-02
Payer: COMMERCIAL

## 2023-06-02 VITALS
WEIGHT: 135 LBS | TEMPERATURE: 97.9 F | SYSTOLIC BLOOD PRESSURE: 95 MMHG | BODY MASS INDEX: 20.53 KG/M2 | HEART RATE: 81 BPM | OXYGEN SATURATION: 97 % | DIASTOLIC BLOOD PRESSURE: 67 MMHG

## 2023-06-02 DIAGNOSIS — B37.31 CANDIDAL VULVOVAGINITIS: Primary | ICD-10-CM

## 2023-06-02 DIAGNOSIS — N95.2 ATROPHIC VAGINITIS: ICD-10-CM

## 2023-06-02 DIAGNOSIS — R35.0 URINARY FREQUENCY: ICD-10-CM

## 2023-06-02 LAB
ALBUMIN UR-MCNC: NEGATIVE MG/DL
APPEARANCE UR: CLEAR
BACTERIA #/AREA URNS HPF: NORMAL /HPF
BILIRUB UR QL STRIP: NEGATIVE
CLUE CELLS: ABNORMAL
COLOR UR AUTO: YELLOW
GLUCOSE UR STRIP-MCNC: NEGATIVE MG/DL
HGB UR QL STRIP: ABNORMAL
KETONES UR STRIP-MCNC: NEGATIVE MG/DL
LEUKOCYTE ESTERASE UR QL STRIP: NEGATIVE
NITRATE UR QL: NEGATIVE
PH UR STRIP: 5.5 [PH] (ref 5–7)
RBC #/AREA URNS AUTO: NORMAL /HPF
SP GR UR STRIP: <=1.005 (ref 1–1.03)
SQUAMOUS #/AREA URNS AUTO: NORMAL /LPF
TRICHOMONAS, WET PREP: ABNORMAL
UROBILINOGEN UR STRIP-ACNC: 0.2 E.U./DL
WBC #/AREA URNS AUTO: NORMAL /HPF
WBC'S/HIGH POWER FIELD, WET PREP: ABNORMAL
YEAST, WET PREP: PRESENT

## 2023-06-02 PROCEDURE — 87210 SMEAR WET MOUNT SALINE/INK: CPT

## 2023-06-02 PROCEDURE — 81001 URINALYSIS AUTO W/SCOPE: CPT | Performed by: PHYSICIAN ASSISTANT

## 2023-06-02 PROCEDURE — 99214 OFFICE O/P EST MOD 30 MIN: CPT | Performed by: PHYSICIAN ASSISTANT

## 2023-06-02 RX ORDER — FLUCONAZOLE 150 MG/1
150 TABLET ORAL ONCE
Qty: 1 TABLET | Refills: 0 | Status: SHIPPED | OUTPATIENT
Start: 2023-06-02 | End: 2023-06-02

## 2023-06-02 ASSESSMENT — ENCOUNTER SYMPTOMS
VOMITING: 0
FREQUENCY: 1
CHILLS: 0
ABDOMINAL PAIN: 0
NAUSEA: 1
FEVER: 0
DIARRHEA: 0
DYSURIA: 0
FLANK PAIN: 0

## 2023-06-02 NOTE — PATIENT INSTRUCTIONS
Wet prep shows you have a yeast infection  Urinary symptoms may be due to atrophic vaginitis  Use water based lubricants with no perfumes, or dyes  Continue using the vaginal cream prescribed by PCP  Follow up with PCP as needed

## 2023-06-16 PROCEDURE — 93272 ECG/REVIEW INTERPRET ONLY: CPT | Performed by: INTERNAL MEDICINE

## 2023-06-21 ENCOUNTER — TRANSFERRED RECORDS (OUTPATIENT)
Dept: HEALTH INFORMATION MANAGEMENT | Facility: CLINIC | Age: 53
End: 2023-06-21

## 2023-06-28 ENCOUNTER — TRANSCRIBE ORDERS (OUTPATIENT)
Dept: LAB | Facility: CLINIC | Age: 53
End: 2023-06-28
Payer: COMMERCIAL

## 2023-06-28 DIAGNOSIS — E03.9 HYPOTHYROIDISM, UNSPECIFIED TYPE: Primary | ICD-10-CM

## 2023-07-07 ENCOUNTER — LAB (OUTPATIENT)
Dept: LAB | Facility: CLINIC | Age: 53
End: 2023-07-07
Payer: COMMERCIAL

## 2023-07-07 DIAGNOSIS — E03.9 HYPOTHYROIDISM, UNSPECIFIED TYPE: ICD-10-CM

## 2023-07-07 DIAGNOSIS — N95.1 MENOPAUSAL AND FEMALE CLIMACTERIC STATES: ICD-10-CM

## 2023-07-07 LAB
ESTRADIOL SERPL-MCNC: <5 PG/ML
FSH SERPL IRP2-ACNC: 111 MIU/ML
SHBG SERPL-SCNC: 74 NMOL/L (ref 30–135)
T3FREE SERPL-MCNC: 2.5 PG/ML (ref 2–4.4)
T4 FREE SERPL-MCNC: 1.63 NG/DL (ref 0.9–1.7)
TSH SERPL DL<=0.005 MIU/L-ACNC: 0.85 UIU/ML (ref 0.3–4.2)

## 2023-07-07 PROCEDURE — 84481 FREE ASSAY (FT-3): CPT

## 2023-07-07 PROCEDURE — 84443 ASSAY THYROID STIM HORMONE: CPT

## 2023-07-07 PROCEDURE — 84439 ASSAY OF FREE THYROXINE: CPT

## 2023-07-07 PROCEDURE — 36415 COLL VENOUS BLD VENIPUNCTURE: CPT

## 2023-07-07 PROCEDURE — 84270 ASSAY OF SEX HORMONE GLOBUL: CPT

## 2023-07-07 PROCEDURE — 84403 ASSAY OF TOTAL TESTOSTERONE: CPT

## 2023-07-07 PROCEDURE — 83001 ASSAY OF GONADOTROPIN (FSH): CPT

## 2023-07-07 PROCEDURE — 82670 ASSAY OF TOTAL ESTRADIOL: CPT

## 2023-07-11 LAB
TESTOST FREE SERPL-MCNC: 0.13 NG/DL
TESTOST SERPL-MCNC: 13 NG/DL (ref 8–60)

## 2023-08-23 DIAGNOSIS — R53.83 FATIGUE: Primary | ICD-10-CM

## 2023-08-30 ENCOUNTER — LAB (OUTPATIENT)
Dept: LAB | Facility: CLINIC | Age: 53
End: 2023-08-30
Payer: COMMERCIAL

## 2023-08-30 ENCOUNTER — NURSE TRIAGE (OUTPATIENT)
Dept: NURSING | Facility: CLINIC | Age: 53
End: 2023-08-30
Payer: COMMERCIAL

## 2023-08-30 DIAGNOSIS — R53.83 FATIGUE: ICD-10-CM

## 2023-08-30 DIAGNOSIS — N89.8 VAGINAL DISCHARGE: Primary | ICD-10-CM

## 2023-08-30 LAB
CLUE CELLS: ABNORMAL
TRICHOMONAS, WET PREP: ABNORMAL
VIT B12 SERPL-MCNC: 2818 PG/ML (ref 232–1245)
WBC'S/HIGH POWER FIELD, WET PREP: ABNORMAL
YEAST, WET PREP: ABNORMAL

## 2023-08-30 PROCEDURE — 87210 SMEAR WET MOUNT SALINE/INK: CPT

## 2023-08-30 PROCEDURE — 82607 VITAMIN B-12: CPT

## 2023-08-30 PROCEDURE — 36415 COLL VENOUS BLD VENIPUNCTURE: CPT

## 2023-08-30 NOTE — TELEPHONE ENCOUNTER
Nurse Triage SBAR    Is this a 2nd Level Triage? NO    Situation: Pt going in for labs at 1530. Wondering if she can have a urine sample added on d/t recent symptoms    Background: Pt has noted increased drainage over the last few days. States this happened is the past and she was diagnosed with a yeast infection.     Assessment: Sticky slightly white/yellow malodorous discharge, and frequent urination. Does note some discomfort with urination.    Protocol Recommended Disposition:   See in Office Within 3 Days, See More Appropriate Protocol    Recommendation: See in office within 3 days. Protocol and care advice reviewed. Advised to call back with any new or worsening signs, symptoms, concerns, or questions. They verbalized understanding and agreed to follow advice given.      Reason for Disposition   Vaginal discharge   Bad smelling vaginal discharge    Additional Information   Negative: Shock suspected (e.g., cold/pale/clammy skin, too weak to stand, low BP, rapid pulse)   Negative: Sounds like a life-threatening emergency to the triager   Negative: Followed a female genital area injury (e.g., vagina, vulva)   Negative: Followed a male genital area injury (penis, scrotum)   Negative: Pain or burning with passing urine (urination) is main symptom   Negative: Pregnant with vaginal discharge   Negative: SEVERE abdominal pain (e.g., excruciating)   Negative: Patient sounds very sick or weak to the triager   Negative: Yellow or green vaginal discharge and has a fever   Negative: Constant abdominal pain lasting > 2 hours   Negative: Mild lower abdominal pain comes and goes (cramps) that lasts > 24 hours   Negative: Genital area looks infected (e.g., draining sore, spreading redness)   Negative: Rash is tiny water blisters (3 or more)    Protocols used: Urinary Symptoms-A-OH, Vaginal Zrowmawxa-J-LQ    Shima Jim RN on 8/30/2023 at 2:46 PM

## 2023-08-30 NOTE — TELEPHONE ENCOUNTER
As similar to previous yeast infection and vaginal discharge. Wet prep. Needs appointment if having urinary symptoms.

## 2023-09-05 ENCOUNTER — MYC MEDICAL ADVICE (OUTPATIENT)
Dept: DERMATOLOGY | Facility: CLINIC | Age: 53
End: 2023-09-05
Payer: COMMERCIAL

## 2023-09-05 DIAGNOSIS — L71.9 ROSACEA: ICD-10-CM

## 2023-09-05 DIAGNOSIS — E03.9 ACQUIRED HYPOTHYROIDISM: ICD-10-CM

## 2023-09-05 RX ORDER — LEVOTHYROXINE SODIUM 125 MCG
125 TABLET ORAL DAILY
Qty: 90 TABLET | Refills: 0 | OUTPATIENT
Start: 2023-09-05

## 2023-09-05 NOTE — TELEPHONE ENCOUNTER
"Refill routed for provide review due to indication for \"ARPAN\" on incoming request.    Last Written Prescription Date:  9/6/2022  Last Fill Quantity: 90,  # refills: 3   Last office visit provider:  10/19/2022     Requested Prescriptions   Pending Prescriptions Disp Refills    SYNTHROID 125 MCG tablet [Pharmacy Med Name: SYNTHROID 0.125MG (125MCG) TABLETS] 90 tablet 3     Sig: TAKE 1 TABLET(125 MCG) BY MOUTH DAILY       Thyroid Protocol Passed - 9/5/2023  4:12 AM        Passed - Patient is 12 years or older        Passed - Recent (12 mo) or future (30 days) visit within the authorizing provider's specialty     Patient has had an office visit with the authorizing provider or a provider within the authorizing providers department within the previous 12 mos or has a future within next 30 days. See \"Patient Info\" tab in inbasket, or \"Choose Columns\" in Meds & Orders section of the refill encounter.              Passed - Medication is active on med list        Passed - Normal TSH on file in past 12 months     Recent Labs   Lab Test 07/07/23  1109   TSH 0.85              Passed - No active pregnancy on record     If patient is pregnant or has had a positive pregnancy test, please check TSH.          Passed - No positive pregnancy test in past 12 months     If patient is pregnant or has had a positive pregnancy test, please check TSH.               Shima Cary RN 09/05/23 2:11 PM  "

## 2023-09-06 RX ORDER — AZELAIC ACID 0.15 G/G
GEL TOPICAL
Qty: 50 G | Refills: 1 | Status: SHIPPED | OUTPATIENT
Start: 2023-09-06 | End: 2023-10-20

## 2023-09-06 NOTE — TELEPHONE ENCOUNTER
azelaic acid (FINACIA) 15 % external gel       Last Written Prescription Date:  7/8/2022  Last Fill Quantity: 50 g,   # refills: 11  Last Office Visit : 7/8/2022  [facility]  Future Office visit:  10/20/2023    Refills approved--quantity thru 10/20/2023 Dermatology visit (medication falls under process #1/Derm protocol).    *patient's 1 year follow up appointment that scheduled for July was cancelled by clinic/provider and was rescheduled to the 10/20/2023 date.

## 2023-09-25 ENCOUNTER — MEDICAL CORRESPONDENCE (OUTPATIENT)
Dept: HEALTH INFORMATION MANAGEMENT | Facility: CLINIC | Age: 53
End: 2023-09-25
Payer: COMMERCIAL

## 2023-09-26 ENCOUNTER — TRANSCRIBE ORDERS (OUTPATIENT)
Dept: OTHER | Age: 53
End: 2023-09-26

## 2023-09-26 DIAGNOSIS — R41.840 INATTENTION: Primary | ICD-10-CM

## 2023-09-26 DIAGNOSIS — Z13.39 ATTENTION DEFICIT HYPERACTIVITY DISORDER EVALUATION: ICD-10-CM

## 2023-09-27 ENCOUNTER — TELEPHONE (OUTPATIENT)
Dept: NEUROPSYCHOLOGY | Facility: CLINIC | Age: 53
End: 2023-09-27
Payer: COMMERCIAL

## 2023-09-27 NOTE — CONFIDENTIAL NOTE
Neuropsych referral declined. FAX sent to referring provider.  Unfortunately, at this time our clinic does not see patients for diagnostic questions related to learning disabilities, ADHD, and/or autism spectrum disorders. In short, we do not currently perform assessments solely for the retrospective diagnosis of developmental conditions in adults.     Possible referral options for your patient could include:    - [facility] - with locations throughout the [facility] area: [phone].    - [facility] - with locations throughout the [facility] area: [phone].    Other resources:    - [facility] - you can search for providers in your community who offer ADHD assessments.    - Contact your insurance company for additional options.    Ina De La Rosa   Psychometrist

## 2023-10-10 ENCOUNTER — OFFICE VISIT (OUTPATIENT)
Dept: URGENT CARE | Facility: URGENT CARE | Age: 53
End: 2023-10-10
Payer: COMMERCIAL

## 2023-10-10 VITALS
DIASTOLIC BLOOD PRESSURE: 72 MMHG | OXYGEN SATURATION: 96 % | WEIGHT: 140 LBS | BODY MASS INDEX: 21.22 KG/M2 | HEIGHT: 68 IN | HEART RATE: 81 BPM | SYSTOLIC BLOOD PRESSURE: 109 MMHG | TEMPERATURE: 98.1 F

## 2023-10-10 DIAGNOSIS — H61.22 IMPACTED CERUMEN OF LEFT EAR: ICD-10-CM

## 2023-10-10 DIAGNOSIS — R00.0 TACHYCARDIA: Primary | ICD-10-CM

## 2023-10-10 PROCEDURE — 99213 OFFICE O/P EST LOW 20 MIN: CPT | Mod: 25 | Performed by: FAMILY MEDICINE

## 2023-10-10 PROCEDURE — 69209 REMOVE IMPACTED EAR WAX UNI: CPT | Mod: LT | Performed by: FAMILY MEDICINE

## 2023-10-10 PROCEDURE — 93000 ELECTROCARDIOGRAM COMPLETE: CPT | Performed by: FAMILY MEDICINE

## 2023-10-10 RX ORDER — CHOLECALCIFEROL (VITAMIN D3) 125 MCG
CAPSULE ORAL
COMMUNITY

## 2023-10-10 NOTE — PROGRESS NOTES
Chief Complaint   Patient presents with    Ear Problem     Ear pain and dark wax in both ears  x1 month     Urgent Care      Problem with heart, has sent message to cardiologist and has not heard back since this weekend  Irregular rapid heart rhythm        Carmel was seen today for ear problem and urgent care.    Diagnoses and all orders for this visit:    Tachycardia  -     EKG 12-lead complete w/read - Clinics    Impacted cerumen of left ear      D/d- otitis media, ET dysfunction , otitis externa , svt,       EKG was within normal limits patient left clinic prior to results to be discussed of the EKG sent a detailed [employer] message to patient.  -Continue home levothyroxine   SUBJECTIVE:  Carmel Fischer is a 52 year old female history of SVT hypothyroidism.   who presents with bilateral ear blockage and wax for 1 month(s).   Severity: moderate she was on propranolol before but has been off it for a while.  Also she has concerns with irregular heart rhythm  Timing:sudden onset  Additional symptoms include none she notices the episodes of tachycardia abruptly and last for few seconds.  Denies any dizziness shortness of breath or chest pain symptoms..      History of recurrent otitis: no    Past Medical History:   Diagnosis Date    Auditory processing disorder 7/26/2021    Autonomic dysfunction 7/26/2021    Bruxism 7/26/2021    Female stress incontinence 9/24/2020    NAPOLEON (generalized anxiety disorder)     Hypoglycemia 7/26/2021    Hypothyroid     Hypothyroidism 7/26/2021    IBS (irritable bowel syndrome)     Insomnia 7/26/2021    Irritable bowel syndrome 7/26/2021    mild MICHAEL (obstructive sleep apnea)     Plantar fasciitis 7/26/2021    PVC's (premature ventricular contractions) 7/26/2021    Just noted on holter    PVD (posterior vitreous detachment), bilateral 7/26/2021    Rosacea 7/26/2021    Seasonal allergic rhinitis 7/26/2021    SVT (supraventricular tachycardia)     Syncope 7/26/2021    Syringomyelia (H)  7/26/2021    Vasovagal reaction      Current Outpatient Medications   Medication Sig Dispense Refill    azelaic acid (FINACIA) 15 % external gel Use 1-2 times daily to face 50 g 1    azelastine (ASTELIN) 0.1 % nasal spray Spray 1 spray into both nostrils daily as needed      dicyclomine (BENTYL) 20 MG tablet Take 1 tablet (20 mg) by mouth 4 times daily as needed (Abdominal pain, cramping) 30 tablet 0    EC-RX Testosterone 0.2 % CREA as directed topically Once a day      ketotifen (ZADITOR) 0.025 % ophthalmic solution Apply 1 drop to eye as needed      L-Theanine 200 MG CAPS       levothyroxine (SYNTHROID) 125 MCG tablet Take 1 tablet (125 mcg) by mouth daily 90 tablet 3    MAGNESIUM GLYCINATE PO Take 2 capsules by mouth daily      Omega-3 Fatty Acids (FISH OIL) 1200 MG capsule Take 1,200 mg by mouth daily      progesterone (PROMETRIUM) 100 MG capsule Take 100 mg by mouth daily Takes at dinner      propranolol (INDERAL) 10 MG tablet       traZODone (DESYREL) 50 MG tablet trazodone 50 mg tablet   TAKE 1/2 TO 1 TABLET BY MOUTH AT BEDTIME      UNABLE TO FIND MEDICATION NAME: ashwaganda      UNABLE TO FIND Take 22.5 mg by mouth daily MEDICATION NAME: Zinc Bisglyconate      UNABLE TO FIND Apply topically 2 times daily MEDICATION NAME: Bi-estrogen (compound) BID      UNABLE TO FIND Take 1 tablet by mouth daily MEDICATION NAME: Zinc & Copper 15mg      vitamin D3 (CHOLECALCIFEROL) 50 mcg (2000 units) tablet Take 50 mcg by mouth daily 2000 units       blood glucose (NO BRAND SPECIFIED) test strip Use to test blood sugar 2  times daily as needed 50 strip 0    tretinoin (RETIN-A) 0.025 % external cream Apply topically At Bedtime 45 g 3     Social History     Tobacco Use    Smoking status: Never     Passive exposure: Never    Smokeless tobacco: Never   Substance Use Topics    Alcohol use: Not Currently       ROS:   Review of systems negative except as stated above.    OBJECTIVE:  /72   Pulse 81   Temp 98.1  F (36.7  C)  "(Temporal)   Ht 1.727 m (5' 8\")   Wt 63.5 kg (140 lb)   SpO2 96%   BMI 21.29 kg/m     EXAM:  The right TM is normal: no effusions, no erythema, and normal landmarks     The right auditory canal is normal and without drainage, edema or erythema  The left TM is normal: no effusions, no erythema, and normal landmarks  The left auditory canal is obstructed with cerumen(it was removed by ear irrigation by MA)  Oropharynx exam is normal: no lesions, erythema, adenopathy or exudate.  GENERAL: no acute distress  EYES: EOMI,  PERRL, conjunctiva clear  NECK: supple, non-tender to palpation, no adenopathy noted  RESP: lungs clear to auscultation - no rales, rhonchi or wheezes  CV: regular rates and rhythm, normal S1 S2, no murmur noted  PSYCH: mentation appears normal    Cortney Donnelly MD     "

## 2023-10-13 ENCOUNTER — TRANSFERRED RECORDS (OUTPATIENT)
Dept: HEALTH INFORMATION MANAGEMENT | Facility: CLINIC | Age: 53
End: 2023-10-13
Payer: COMMERCIAL

## 2023-10-13 LAB
ALT SERPL-CCNC: 18 U/L (ref 6–29)
AST SERPL-CCNC: 20 U/L (ref 10–35)
CHOLESTEROL (EXTERNAL): 220 MG/DL
CREATININE (EXTERNAL): 0.69 MG/DL (ref 0.5–1.03)
GFR ESTIMATED (EXTERNAL): 104 ML/MIN/1.73M2
GLUCOSE (EXTERNAL): 90 MG/DL (ref 65–99)
HDLC SERPL-MCNC: 65 MG/DL
LDL CHOLESTEROL CALCULATED (EXTERNAL): 126 MG/DL
NON HDL CHOLESTEROL (EXTERNAL): 155 MG/DL
POTASSIUM (EXTERNAL): 4.6 MMOL/L (ref 3.5–5.3)
TRIGLYCERIDES (EXTERNAL): 159 MG/DL
TSH SERPL-ACNC: 0.79 UIU/ML (ref 0.45–5.33)

## 2023-10-20 ENCOUNTER — OFFICE VISIT (OUTPATIENT)
Dept: DERMATOLOGY | Facility: CLINIC | Age: 53
End: 2023-10-20
Payer: COMMERCIAL

## 2023-10-20 DIAGNOSIS — B07.0 VERRUCA PLANTARIS: ICD-10-CM

## 2023-10-20 DIAGNOSIS — D22.9 MULTIPLE BENIGN NEVI: ICD-10-CM

## 2023-10-20 DIAGNOSIS — L70.0 ACNE VULGARIS: ICD-10-CM

## 2023-10-20 DIAGNOSIS — L82.1 SEBORRHEIC KERATOSIS: ICD-10-CM

## 2023-10-20 DIAGNOSIS — D18.01 CHERRY ANGIOMA: ICD-10-CM

## 2023-10-20 DIAGNOSIS — L81.4 LENTIGINES: ICD-10-CM

## 2023-10-20 DIAGNOSIS — L71.9 ROSACEA: Primary | ICD-10-CM

## 2023-10-20 DIAGNOSIS — R20.2 NOTALGIA PARESTHETICA: ICD-10-CM

## 2023-10-20 PROCEDURE — 99214 OFFICE O/P EST MOD 30 MIN: CPT | Mod: 25 | Performed by: DERMATOLOGY

## 2023-10-20 PROCEDURE — 17110 DESTRUCTION B9 LES UP TO 14: CPT | Performed by: DERMATOLOGY

## 2023-10-20 RX ORDER — AZELAIC ACID 0.15 G/G
GEL TOPICAL
Qty: 50 G | Refills: 11 | Status: SHIPPED | OUTPATIENT
Start: 2023-10-20

## 2023-10-20 RX ORDER — VIT C/HESPERIDIN/BIOFLAVONOIDS 500-100 MG
30 TABLET ORAL
COMMUNITY

## 2023-10-20 RX ORDER — ADAPALENE 0.1 G/100G
CREAM TOPICAL
Qty: 45 G | Refills: 11 | Status: SHIPPED | OUTPATIENT
Start: 2023-10-20

## 2023-10-20 ASSESSMENT — PAIN SCALES - GENERAL: PAINLEVEL: NO PAIN (0)

## 2023-10-20 NOTE — PATIENT INSTRUCTIONS
Salicylic acid 40% (wart stick)  Apply nightly, cover with duct tape, remove in AM.        Topical Retinoids    What are topical retinoids?  These are medicines that are related to Vitamin A. They are used on the skin.  Retin-A , Renova , Differin , and Tazorac  are brand names.  Come in creams and clear gels  Used to treat skin conditions like pimples (acne), face wrinkling, or dark-colored sunspots    How do I use these medicines?  Wash face and let dry for 15 to 30 minutes.  Use a large pea-size amount of medicine to cover the whole face. Do not put on close to the eyes and lips. Rub in gently.   Start by using every other day. If you have no irritation after a few days, start to use it daily.   You might have too much irritation with daily use. Use it less often until the irritation goes away. Then try to increase slowly to daily use.   Irritation improves over time.  You may use moisturizer if your skin becomes dry. Look for  non-comedogenic  (non-pore plugging) and oil free products.     What are the side effects?  Dryness   Peeling and flaking   Irritation of the skin   Possible increased chance of sunburns. Protect your skin from sunlight. Wear a hat and use a sunscreen with SPF 30 or higher. Your sunscreen should have both UVA and UVB (broad-spectrum) protection.    Who should I call with questions?  [facility]: [phone]   [facility]: [phone]  For urgent needs outside of business hours call the [facility] at [phone] and ask for the dermatology resident on call     Cryotherapy    What is it?  Use of a very cold liquid, such as liquid nitrogen, to freeze and destroy abnormal skin cells that need to be removed    What should I expect?  Tenderness and redness  A small blister that might grow and fill with dark purple blood. There may be crusting.  More than one treatment may be needed if the lesions do not go away.    How do I  care for the treated area?  Gently wash the area with your hands when bathing.  Use a thin layer of Vaseline to help with healing. You may use a Band-Aid.   The area should heal within 7-10 days and may leave behind a pink or lighter color.   Do not use an antibiotic or Neosporin ointment.   You may take acetaminophen (Tylenol) for pain.     Call your doctor if you have:  Severe pain  Signs of infection (warmth, redness, cloudy yellow drainage, and or a bad smell)  Questions or concerns    Who should I call with questions?      [facility]: [phone]      [facility]: [phone]      For urgent needs outside of business hours call the [facility] at [phone] and ask for the dermatology resident on call       Checking for Skin Cancer  You can help find cancer early by checking your skin each month. There are 3 main kinds of skin cancer: melanoma, basal cell carcinoma, and squamous cell carcinoma. Doing monthly skin checks is the best way to find new marks, sores, or skin changes. Follow these instructions for checking your skin.   The ABCDEs of checking moles for melanoma   Check your moles or growths for signs of melanoma using ABCDE:   Asymmetry: The sides of the mole or growth don t match.  Border: The edges are ragged, notched, or blurred.  Color: The color within the mole or growth varies. It could be black, brown, tan, white, or shades of red, gray, or blue.  Diameter: The mole or growth is larger than   inch or 6 mm (size of a pencil eraser).  Evolving: The size, shape, texture, or color of the mole or growth is changing.     ABCDE's of moles on light skin.        ABCDE's of moles on dark skin may be harder to identify.     Checking for other types of skin cancer  Basal cell carcinoma or squamous cell carcinoma cause symptoms like:     A spot or mole that looks different from all other marks on your skin  Changes in how an area feels,  such as itching, tenderness, or pain  Changes in the skin's surface, such as oozing, bleeding, or scaliness  A sore that doesn't heal  New swelling, redness, or spread of color beyond the border of a mole    Who s at risk?  Anyone of any skin color can get skin cancer. But you're at greater risk if you have:   Fair skin that freckles easily and burns instead of tanning  Light-colored or red hair  Light-colored eyes  Many moles or abnormal moles on your skin  A long history of unprotected exposure to sunlight or tanning beds  A history of many blistering sunburns as a child or teen  A family history of skin cancer  Been exposed to radiation or chemicals  A weakened immune system  Been exposed to arsenic  If you've had skin cancer in the past, you're at high risk of having it again.   How to check your skin  Do your monthly skin checkups in front of a full-length mirror. Use a room with good lighting so it's easier to see. Use a hand mirror to look at hard-to-see places like your buttocks and back. You can also have a trusted friend or family member help you with these checks. Check every part of your body, including your:   Head (ears, face, neck, and scalp)  Torso (front, back, sides, and under breasts)  Arms (tops, undersides, and armpits)  Hands (palms, backs, and fingers, including under the nails)  Lower back, buttocks, and genitals  Legs (front, back, and sides)  Feet (tops, soles, toes, including under the nails, and between toes)  Watch for new spots on your skin or a spot that's changing in color, shape, size.   If you have a lot of moles, take digital photos of them each month. Make sure to take photos both up close and from a distance. These can help you see if any moles change over time.   Know your skin  Most skin changes aren't cancer. But if you see any changes in your skin, call your healthcare provider right away. Only they can tell you if a change is a problem. If you have skin cancer, seeing your  provider can be the first step to getting the treatment that could save your life.   Isaiah last reviewed this educational content on 10/1/2021    3198-6776 [employer]. All rights reserved. This information is not intended as a substitute for professional medical care. Always follow your healthcare professional's instructions.

## 2023-10-20 NOTE — LETTER
10/20/2023       RE: Carmel Fischer  [address]  [place] [address]     Dear Colleague,    Thank you for referring your patient, Carmel Fischer, to the [facility] [place] at [facility]. Please see a copy of my visit note below.    [facility] Dermatology Note  Encounter Date: Oct 20, 2023  Office Visit     Dermatology Problem List:  Last skin check 10/20/23   # Telogen effluvium  # Rosacea  - Current tx: azelaic acid 15%  # Acne.  - BP 5% wash, adapalene 0.1% cream  # Notalgia paresthetica  - Stretching exercises  # Verruca plantaris, right 1st toe, s/p cryotherapy 10/20/23    # lesion to monitor on right nasal supratip    ____________________________________________    Assessment & Plan:    # Rosacea, chronic, well controlled on azelaic acid.   - Continue azelaic acid 15% gel. Refill provided today.  - Future considerations: topical abx     # Acne vulgaris, chronic, active. Mostly blackheads on nose.   - Using BP 4% wash  - Start adapalene 0.1% cream.  - Future considerations: tretinoin 0.025% cream    # lesion to monitor on right supra nasal tip  - notify for any changes     # Notalgia paresthetica  - Has been completing stretching exercises without improvement.  -  will try Sarna or CeraVe with pramoxine    # Verruca plantaris, right 1st toe .   - Cryotherapy performed today (see procedure note(s) below).  - Recommended salicylic acid 40%    # Benign lesions - SKs, cherry angiomas, lentigenes.  - No treatment required    # Multiple benign nevi.   - Monitor for ABCDEs of melanoma   - Continue sun protection - recommend SPF 30 or higher with frequent application   - Return sooner if noticing changing or symptomatic lesions       Procedures Performed:   - Cryotherapy procedure note, location(s): see above. After verbal consent and discussion of risks and benefits including, but not limited to,  dyspigmentation/scar, blister, and pain, 1 lesion(s) was(were) treated with 1-2 mm freeze border for 1-2 cycles with liquid nitrogen. Post cryotherapy instructions were provided.    Follow-up: 1 year(s) in-person, or earlier for new or changing lesions    Staff and Scribe:     I, Georgia English, am serving as a scribe to document services personally performed by Dr. Nathalie Almendarez, based on data collection and the provider's statements to me.     Provider Disclosure:   The documentation recorded by the scribe accurately reflects the services I personally performed and the decisions made by me.    Reyna Gomes MD    Department of Dermatology  [facility]: Phone: [phone], Fax: [phone]  10/25/2023     ____________________________________________    CC: Skin Check (Patient reports no new lesions of concern. The patient would like a full body skin check. )    HPI:  Ms. Carmel Fischer is a(n) 52 year old female who presents today as a return patient for FBSE.    She mentions concern for a spot on her right 1st toe. Denies toe rubbing against shoe.    She feels that her skinned has became more sensitive.     Rosacea worse when skin is dry.    Patient is otherwise feeling well, without additional skin concerns.    Labs Reviewed:  N/A    Physical Exam:  Vitals: There were no vitals taken for this visit.  SKIN: Total skin excluding the undergarment areas was performed. The exam included the head/face, neck, both arms, chest, back, abdomen, both legs, digits and/or nails.   -  Hyperkeratotic papule on right 1st toe.  -  2 mm bland hyperpigmented papule without vessels on right nasal supra tip.  - There are dome shaped bright red papules on the trunk and extremities .   - Multiple regular brown pigmented macules and papules are identified on the trunk and extremities. .   - Scattered brown macules on sun exposed  areas.  - Waxy stuck on papules and plaques on trunk and extremities.   - No other lesions of concern on areas examined.     Medications:  Current Outpatient Medications   Medication    ASHWAGANDHA PO    azelaic acid (FINACIA) 15 % external gel    azelastine (ASTELIN) 0.1 % nasal spray    dicyclomine (BENTYL) 20 MG tablet    EC-RX Testosterone 0.2 % CREA    ketotifen (ZADITOR) 0.025 % ophthalmic solution    L-Theanine 200 MG CAPS    levothyroxine (SYNTHROID) 125 MCG tablet    MAGNESIUM GLYCINATE PO    Omega-3 Fatty Acids (FISH OIL) 1200 MG capsule    progesterone (PROMETRIUM) 100 MG capsule    propranolol (INDERAL) 10 MG tablet    traZODone (DESYREL) 50 MG tablet    UNABLE TO FIND    UNABLE TO FIND    UNABLE TO FIND    UNABLE TO FIND    vitamin D3 (CHOLECALCIFEROL) 50 mcg (2000 units) tablet    Zinc 30 MG TABS    blood glucose (NO BRAND SPECIFIED) test strip     No current facility-administered medications for this visit.      Past Medical History:   Patient Active Problem List   Diagnosis    Female stress incontinence    NAPOLEON (generalized anxiety disorder)    Insomnia    Autonomic dysfunction    SVT (supraventricular tachycardia)    Syncope    Hypothyroidism    Irritable bowel syndrome    Syringomyelia (H)    Hypoglycemia    Rosacea    Seasonal allergic rhinitis    Bruxism    PVC's (premature ventricular contractions)    Plantar fasciitis    PVD (posterior vitreous detachment), bilateral    Auditory processing disorder     Past Medical History:   Diagnosis Date    Auditory processing disorder 7/26/2021    Autonomic dysfunction 7/26/2021    Bruxism 7/26/2021    Female stress incontinence 9/24/2020    NAPOLEON (generalized anxiety disorder)     Hypoglycemia 7/26/2021    Hypothyroid     Hypothyroidism 7/26/2021    IBS (irritable bowel syndrome)     Insomnia 7/26/2021    Irritable bowel syndrome 7/26/2021    mild MICHAEL (obstructive sleep apnea)     Plantar fasciitis 7/26/2021    PVC's (premature ventricular contractions)  7/26/2021    Just noted on holter    PVD (posterior vitreous detachment), bilateral 7/26/2021    Rosacea 7/26/2021    Seasonal allergic rhinitis 7/26/2021    SVT (supraventricular tachycardia)     Syncope 7/26/2021    Syringomyelia (H) 7/26/2021    Vasovagal reaction         CC No referring provider defined for this encounter. on close of this encounter.

## 2023-10-20 NOTE — PROGRESS NOTES
[facility] Dermatology Note  Encounter Date: Oct 20, 2023  Office Visit     Dermatology Problem List:  Last skin check 10/20/23   # Telogen effluvium  # Rosacea  - Current tx: azelaic acid 15%  # Acne.  - BP 5% wash, adapalene 0.1% cream  # Notalgia paresthetica  - Stretching exercises  # Verruca plantaris, right 1st toe, s/p cryotherapy 10/20/23    # lesion to monitor on right nasal supratip    ____________________________________________    Assessment & Plan:    # Rosacea, chronic, well controlled on azelaic acid.   - Continue azelaic acid 15% gel. Refill provided today.  - Future considerations: topical abx     # Acne vulgaris, chronic, active. Mostly blackheads on nose.   - Using BP 4% wash  - Start adapalene 0.1% cream.  - Future considerations: tretinoin 0.025% cream    # lesion to monitor on right supra nasal tip  - notify for any changes     # Notalgia paresthetica  - Has been completing stretching exercises without improvement.  -  will try Sarna or CeraVe with pramoxine    # Verruca plantaris, right 1st toe .   - Cryotherapy performed today (see procedure note(s) below).  - Recommended salicylic acid 40%    # Benign lesions - SKs, cherry angiomas, lentigenes.  - No treatment required    # Multiple benign nevi.   - Monitor for ABCDEs of melanoma   - Continue sun protection - recommend SPF 30 or higher with frequent application   - Return sooner if noticing changing or symptomatic lesions       Procedures Performed:   - Cryotherapy procedure note, location(s): see above. After verbal consent and discussion of risks and benefits including, but not limited to, dyspigmentation/scar, blister, and pain, 1 lesion(s) was(were) treated with 1-2 mm freeze border for 1-2 cycles with liquid nitrogen. Post cryotherapy instructions were provided.    Follow-up: 1 year(s) in-person, or earlier for new or changing lesions    Staff and Scribe:     IG[place], am serving as a scribe to document  services personally performed by Dr. Nathalie Almendarez, based on data collection and the provider's statements to me.     Provider Disclosure:   The documentation recorded by the scribe accurately reflects the services I personally performed and the decisions made by me.    Reyna Gomes MD    Department of Dermatology  [facility]: Phone: [phone], Fax: [phone]  10/25/2023     ____________________________________________    CC: Skin Check (Patient reports no new lesions of concern. The patient would like a full body skin check. )    HPI:  Ms. Carmel Fischer is a(n) 52 year old female who presents today as a return patient for FBSE.    She mentions concern for a spot on her right 1st toe. Denies toe rubbing against shoe.    She feels that her skinned has became more sensitive.     Rosacea worse when skin is dry.    Patient is otherwise feeling well, without additional skin concerns.    Labs Reviewed:  N/A    Physical Exam:  Vitals: There were no vitals taken for this visit.  SKIN: Total skin excluding the undergarment areas was performed. The exam included the head/face, neck, both arms, chest, back, abdomen, both legs, digits and/or nails.   -  Hyperkeratotic papule on right 1st toe.  -  2 mm bland hyperpigmented papule without vessels on right nasal supra tip.  - There are dome shaped bright red papules on the trunk and extremities .   - Multiple regular brown pigmented macules and papules are identified on the trunk and extremities. .   - Scattered brown macules on sun exposed areas.  - Waxy stuck on papules and plaques on trunk and extremities.   - No other lesions of concern on areas examined.     Medications:  Current Outpatient Medications   Medication    ASHWAGANDHA PO    azelaic acid (FINACIA) 15 % external gel    azelastine (ASTELIN) 0.1 % nasal spray    dicyclomine (BENTYL) 20 MG tablet    EC-RX  Testosterone 0.2 % CREA    ketotifen (ZADITOR) 0.025 % ophthalmic solution    L-Theanine 200 MG CAPS    levothyroxine (SYNTHROID) 125 MCG tablet    MAGNESIUM GLYCINATE PO    Omega-3 Fatty Acids (FISH OIL) 1200 MG capsule    progesterone (PROMETRIUM) 100 MG capsule    propranolol (INDERAL) 10 MG tablet    traZODone (DESYREL) 50 MG tablet    UNABLE TO FIND    UNABLE TO FIND    UNABLE TO FIND    UNABLE TO FIND    vitamin D3 (CHOLECALCIFEROL) 50 mcg (2000 units) tablet    Zinc 30 MG TABS    blood glucose (NO BRAND SPECIFIED) test strip     No current facility-administered medications for this visit.      Past Medical History:   Patient Active Problem List   Diagnosis    Female stress incontinence    NAPOLEON (generalized anxiety disorder)    Insomnia    Autonomic dysfunction    SVT (supraventricular tachycardia)    Syncope    Hypothyroidism    Irritable bowel syndrome    Syringomyelia (H)    Hypoglycemia    Rosacea    Seasonal allergic rhinitis    Bruxism    PVC's (premature ventricular contractions)    Plantar fasciitis    PVD (posterior vitreous detachment), bilateral    Auditory processing disorder     Past Medical History:   Diagnosis Date    Auditory processing disorder 7/26/2021    Autonomic dysfunction 7/26/2021    Bruxism 7/26/2021    Female stress incontinence 9/24/2020    NAPOLEON (generalized anxiety disorder)     Hypoglycemia 7/26/2021    Hypothyroid     Hypothyroidism 7/26/2021    IBS (irritable bowel syndrome)     Insomnia 7/26/2021    Irritable bowel syndrome 7/26/2021    mild MICHAEL (obstructive sleep apnea)     Plantar fasciitis 7/26/2021    PVC's (premature ventricular contractions) 7/26/2021    Just noted on holter    PVD (posterior vitreous detachment), bilateral 7/26/2021    Rosacea 7/26/2021    Seasonal allergic rhinitis 7/26/2021    SVT (supraventricular tachycardia)     Syncope 7/26/2021    Syringomyelia (H) 7/26/2021    Vasovagal reaction         CC No referring provider defined for this encounter. on close  of this encounter.

## 2023-10-26 ENCOUNTER — OFFICE VISIT (OUTPATIENT)
Dept: CARDIOLOGY | Facility: CLINIC | Age: 53
End: 2023-10-26
Payer: COMMERCIAL

## 2023-10-26 VITALS
SYSTOLIC BLOOD PRESSURE: 108 MMHG | HEART RATE: 84 BPM | OXYGEN SATURATION: 98 % | RESPIRATION RATE: 16 BRPM | DIASTOLIC BLOOD PRESSURE: 62 MMHG | WEIGHT: 139.9 LBS | HEIGHT: 68 IN | BODY MASS INDEX: 21.2 KG/M2

## 2023-10-26 DIAGNOSIS — I47.10 SVT (SUPRAVENTRICULAR TACHYCARDIA) (H): Primary | ICD-10-CM

## 2023-10-26 DIAGNOSIS — R00.2 PALPITATIONS: ICD-10-CM

## 2023-10-26 PROCEDURE — 99214 OFFICE O/P EST MOD 30 MIN: CPT | Performed by: INTERNAL MEDICINE

## 2023-10-26 RX ORDER — METOPROLOL SUCCINATE 25 MG/1
12.5 TABLET, EXTENDED RELEASE ORAL DAILY
Qty: 30 TABLET | Refills: 6 | Status: SHIPPED | OUTPATIENT
Start: 2023-10-26

## 2023-10-26 NOTE — PROGRESS NOTES
Thank you, Dr. Dayana Calle, for asking the [facility] team to see Ms. Carmel Fischer to follow-up on paroxysmal atrial tachycardia, palpitations.    Assessment/Recommendations   Assessment:    1.  Paroxysmal atrial tachycardia/palpitations, stable.  She does believe that these episodes are being triggered by a supplement she is taking for mood stabilization.  She has been off that supplement for the last couple weeks and has noted less episodes but feels she needs to go back on the supplement to help with her mood especially as she transitions into the winter months.  Because the dose of propranolol she has been using has been quite small and difficult to take, I reiterated Dr. Escobar's recommendations for switch to long-acting metoprolol.  Suggested a trial of metoprolol succinate 12.5 mg daily or as needed which would provide more prolonged effects.  I did caution her on blood pressure lowering effects of beta-blockers and the need to maintain adequate hydration.  Also brought up the potential of causing worsening depression symptoms with any beta-blocker therapy.  She will continue to monitor for this.  2.  Depression/anxiety    Plan:  1.  Discontinue propranolol and transition to metoprolol succinate 12.5 mg daily.  2.  Consider referral to EP if she is unable to tolerate the beta-blocker and continues to have symptomatic events to see if they recommend alternative treatment.       History of Present Illness    Ms. Carmel Fischer is a 52 year old female with history of paroxysmal atrial tachycardia, PACs who presents to the office today for a follow-up visit.  Has previously been under the care of Dr. Escobar here in our office who left the practice to [place] of [place].  Previously, the patient has taken propranolol 2.5 mg 4 times daily as needed to suppress her episodes.  She has also been on a supplement to help with mood swings but felt that the supplement was increasing her  "palpitations.  As a result, she stopped taking the supplement a couple of weeks ago and has noted improvement in the palpitations but worsening of her mood.  Does tend to get more depressed in the winter months and would like to go back on the supplement but is concerned about the palpitations.  Dr. Escobar had brought up the option of switching her from propranolol to metoprolol succinate; however, this never happened.  Patient has had no prolonged episodes of tachycardia.  An event monitor this summer demonstrated brief episodes of atrial tachycardia but no prolonged episodes.    ECG (personally reviewed): No ECG today    Cardiac Imaging Studies (personally reviewed): No recent cardiac imaging     Physical Examination Review of Systems   /62 (BP Location: Right arm, Patient Position: Sitting, Cuff Size: Adult Regular)   Pulse 84   Resp 16   Ht 1.727 m (5' 8\")   Wt 63.5 kg (139 lb 14.4 oz)   SpO2 98%   BMI 21.27 kg/m    Body mass index is 21.27 kg/m .  Wt Readings from Last 3 Encounters:   10/26/23 63.5 kg (139 lb 14.4 oz)   10/10/23 63.5 kg (140 lb)   06/02/23 61.2 kg (135 lb)     General Appearance:   Awake, Alert, No acute distress.   HEENT:  No scleral icterus; the mucous membranes were pink and moist.   Neck: No cervical bruits or jugular venous distention    Chest: The spine was straight. The chest was symmetric.   Lungs:   Respirations unlabored; the lungs are clear to auscultation. No wheezing   Cardiovascular:   Regular rate and rhythm.  S1, S2 normal.  No murmur or gallop   Abdomen:  No organomegaly, masses, bruits, or tenderness. Bowels sounds are present   Extremities: No peripheral edema bilaterally   Skin: No xanthelasma. Warm, Dry.   Musculoskeletal: No tenderness.   Neurologic: Mood and affect are appropriate.    Enc Vitals  BP: 108/62  Pulse: 84  Resp: 16  SpO2: 98 %  Weight: 63.5 kg (139 lb 14.4 oz)  Height: 172.7 cm (5' 8\")                                         Medical History  " Surgical History Family History Social History   Past Medical History:   Diagnosis Date     Auditory processing disorder 2021     Autonomic dysfunction 2021     Bruxism 2021     Female stress incontinence 2020     NAPOLEON (generalized anxiety disorder)      Hypoglycemia 2021     Hypothyroid      Hypothyroidism 2021     IBS (irritable bowel syndrome)      Insomnia 2021     Irritable bowel syndrome 2021     mild MIHCAEL (obstructive sleep apnea)      Plantar fasciitis 2021     PVC's (premature ventricular contractions) 2021    Just noted on holter     PVD (posterior vitreous detachment), bilateral 2021     Rosacea 2021     Seasonal allergic rhinitis 2021     SVT (supraventricular tachycardia)      Syncope 2021     Syringomyelia (H) 2021     Vasovagal reaction     Past Surgical History:   Procedure Laterality Date      SECTION       WISDOM TOOTH EXTRACTION      Family History   Problem Relation Age of Onset     Atrial fibrillation Father      Cerebrovascular Disease Father      Dementia Father      Ovarian Cancer Mother      Hypertension Mother      Sleep Apnea Brother      Myocardial Infarction Maternal Grandmother      Cancer Paternal Grandfather      Colon Cancer No family hx of      Breast Cancer No family hx of     Social History     Socioeconomic History     Marital status: Single     Spouse name: Not on file     Number of children: Not on file     Years of education: Not on file     Highest education level: Not on file   Occupational History     Occupation: homemaker     Occupation: student:    Tobacco Use     Smoking status: Never     Passive exposure: Never     Smokeless tobacco: Never   Substance and Sexual Activity     Alcohol use: Not Currently     Drug use: Not Currently     Sexual activity: Not Currently   Other Topics Concern     Parent/sibling w/ CABG, MI or angioplasty before 65F 55M? Not Asked   Social History  Narrative     Not on file     Social Determinants of Health     Financial Resource Strain: Not on file   Food Insecurity: Not on file   Transportation Needs: Not on file   Physical Activity: Not on file   Stress: Not on file   Social Connections: Not on file   Interpersonal Safety: Not on file   Housing Stability: Not on file          Medications  Allergies   Current Outpatient Medications   Medication Sig Dispense Refill     adapalene (DIFFERIN) 0.1 % external cream Apply pea-sized amount to face at bedtime. Start every other night for 1-2 weeks, then increase to nightly as tolerated. 45 g 11     ASHWAGANDHA PO        azelaic acid (FINACIA) 15 % external gel Use 1-2 times daily to face 50 g 11     azelastine (ASTELIN) 0.1 % nasal spray Spray 1 spray into both nostrils daily as needed       dicyclomine (BENTYL) 20 MG tablet Take 1 tablet (20 mg) by mouth 4 times daily as needed (Abdominal pain, cramping) 30 tablet 0     EC-RX Testosterone 0.2 % CREA as directed topically Once a day       ketotifen (ZADITOR) 0.025 % ophthalmic solution Apply 1 drop to eye as needed       L-Theanine 200 MG CAPS        levothyroxine (SYNTHROID) 125 MCG tablet Take 1 tablet (125 mcg) by mouth daily 90 tablet 3     MAGNESIUM GLYCINATE PO Take 2 capsules by mouth daily       metoprolol succinate ER (TOPROL XL) 25 MG 24 hr tablet Take 0.5 tablets (12.5 mg) by mouth daily 30 tablet 6     Omega-3 Fatty Acids (FISH OIL) 1200 MG capsule Take 1,200 mg by mouth daily       progesterone (PROMETRIUM) 100 MG capsule Take 100 mg by mouth daily Takes at dinner       traZODone (DESYREL) 50 MG tablet trazodone 50 mg tablet   TAKE 1/2 TO 1 TABLET BY MOUTH AT BEDTIME       UNABLE TO FIND MEDICATION NAME: myrawaganda       UNABLE TO FIND Take 22.5 mg by mouth daily MEDICATION NAME: Zinc Bisglyconate       UNABLE TO FIND Apply topically 2 times daily MEDICATION NAME: Bi-estrogen (compound) BID       UNABLE TO FIND Take 1 tablet by mouth daily MEDICATION  NAME: Zinc & Copper 15mg       vitamin D3 (CHOLECALCIFEROL) 50 mcg (2000 units) tablet Take 50 mcg by mouth daily 2000 units        Zinc 30 MG TABS 30 mg        Allergies   Allergen Reactions     Apple Other (See Comments)     Apple Juice Other (See Comments)     Skin on roof of mouth got sensitive and then she noted sometimes a piece of skin from the roof of the mouth fell off as a result of this.      Melon Other (See Comments)     Oral reaction      Mold      Morphine Nausea and Vomiting and Other (See Comments)     Nsaids Hives and Other (See Comments)     Other Environmental Allergy      Pear Other (See Comments)     Tomato Hives and Other (See Comments)         Lab Results    Chemistry/lipid CBC Cardiac Enzymes/BNP/TSH/INR   Recent Labs   Lab Test 05/29/22  1134 10/13/21  0957   TRIG  --  107   LDL  --  121   BUN 19 13    140   CO2 28 27    Recent Labs   Lab Test 10/19/22  1055   WBC 5.7   HGB 13.5   HCT 41.2   MCV 96       Recent Labs   Lab Test 07/07/23  1109   TSH 0.85        A total of 40 minutes was spent reviewing patient's medical records, obtaining history and performing examination, as well as discussing diagnoses/ recommendations with patient and answering all questions.

## 2023-10-26 NOTE — PATIENT INSTRUCTIONS
Recommend switch from propranolol to metoprolol succinate 12.5 mg daily or as needed for palpitations  Consider [employer] mobile device to track rhythm issues at home.

## 2023-10-26 NOTE — LETTER
10/26/2023    Dayana Calle MD  [facility] [address]  [facility] [address]    RE: Carmel Fischer       Dear Colleague,     I had the pleasure of seeing Carmel Fischer in the [facility].      Thank you, Dr. Dayana Calle, for asking the [facility] team to see Ms. Carmel Fischer to follow-up on paroxysmal atrial tachycardia, palpitations.    Assessment/Recommendations   Assessment:    1.  Paroxysmal atrial tachycardia/palpitations, stable.  She does believe that these episodes are being triggered by a supplement she is taking for mood stabilization.  She has been off that supplement for the last couple weeks and has noted less episodes but feels she needs to go back on the supplement to help with her mood especially as she transitions into the winter months.  Because the dose of propranolol she has been using has been quite small and difficult to take, I reiterated Dr. Escobar's recommendations for switch to long-acting metoprolol.  Suggested a trial of metoprolol succinate 12.5 mg daily or as needed which would provide more prolonged effects.  I did caution her on blood pressure lowering effects of beta-blockers and the need to maintain adequate hydration.  Also brought up the potential of causing worsening depression symptoms with any beta-blocker therapy.  She will continue to monitor for this.  2.  Depression/anxiety    Plan:  1.  Discontinue propranolol and transition to metoprolol succinate 12.5 mg daily.  2.  Consider referral to EP if she is unable to tolerate the beta-blocker and continues to have symptomatic events to see if they recommend alternative treatment.       History of Present Illness    Ms. Carmel Fischer is a 52 year old female with history of paroxysmal atrial tachycardia, PACs who presents to the office today for a follow-up visit.  Has previously been under the care of Dr. Escobar here in our office who left the  "practice to [place] of [place].  Previously, the patient has taken propranolol 2.5 mg 4 times daily as needed to suppress her episodes.  She has also been on a supplement to help with mood swings but felt that the supplement was increasing her palpitations.  As a result, she stopped taking the supplement a couple of weeks ago and has noted improvement in the palpitations but worsening of her mood.  Does tend to get more depressed in the winter months and would like to go back on the supplement but is concerned about the palpitations.  Dr. Escobar had brought up the option of switching her from propranolol to metoprolol succinate; however, this never happened.  Patient has had no prolonged episodes of tachycardia.  An event monitor this summer demonstrated brief episodes of atrial tachycardia but no prolonged episodes.    ECG (personally reviewed): No ECG today    Cardiac Imaging Studies (personally reviewed): No recent cardiac imaging     Physical Examination Review of Systems   /62 (BP Location: Right arm, Patient Position: Sitting, Cuff Size: Adult Regular)   Pulse 84   Resp 16   Ht 1.727 m (5' 8\")   Wt 63.5 kg (139 lb 14.4 oz)   SpO2 98%   BMI 21.27 kg/m    Body mass index is 21.27 kg/m .  Wt Readings from Last 3 Encounters:   10/26/23 63.5 kg (139 lb 14.4 oz)   10/10/23 63.5 kg (140 lb)   06/02/23 61.2 kg (135 lb)     General Appearance:   Awake, Alert, No acute distress.   HEENT:  No scleral icterus; the mucous membranes were pink and moist.   Neck: No cervical bruits or jugular venous distention    Chest: The spine was straight. The chest was symmetric.   Lungs:   Respirations unlabored; the lungs are clear to auscultation. No wheezing   Cardiovascular:   Regular rate and rhythm.  S1, S2 normal.  No murmur or gallop   Abdomen:  No organomegaly, masses, bruits, or tenderness. Bowels sounds are present   Extremities: No peripheral edema bilaterally   Skin: No xanthelasma. Warm, Dry. " "  Musculoskeletal: No tenderness.   Neurologic: Mood and affect are appropriate.    Enc Vitals  BP: 108/62  Pulse: 84  Resp: 16  SpO2: 98 %  Weight: 63.5 kg (139 lb 14.4 oz)  Height: 172.7 cm (5' 8\")                                         Medical History  Surgical History Family History Social History   Past Medical History:   Diagnosis Date    Auditory processing disorder 2021    Autonomic dysfunction 2021    Bruxism 2021    Female stress incontinence 2020    NAPOLEON (generalized anxiety disorder)     Hypoglycemia 2021    Hypothyroid     Hypothyroidism 2021    IBS (irritable bowel syndrome)     Insomnia 2021    Irritable bowel syndrome 2021    mild MICHAEL (obstructive sleep apnea)     Plantar fasciitis 2021    PVC's (premature ventricular contractions) 2021    Just noted on holter    PVD (posterior vitreous detachment), bilateral 2021    Rosacea 2021    Seasonal allergic rhinitis 2021    SVT (supraventricular tachycardia)     Syncope 2021    Syringomyelia (H) 2021    Vasovagal reaction     Past Surgical History:   Procedure Laterality Date     SECTION      WISDOM TOOTH EXTRACTION      Family History   Problem Relation Age of Onset    Atrial fibrillation Father     Cerebrovascular Disease Father     Dementia Father     Ovarian Cancer Mother     Hypertension Mother     Sleep Apnea Brother     Myocardial Infarction Maternal Grandmother     Cancer Paternal Grandfather     Colon Cancer No family hx of     Breast Cancer No family hx of     Social History     Socioeconomic History    Marital status: Single     Spouse name: Not on file    Number of children: Not on file    Years of education: Not on file    Highest education level: Not on file   Occupational History    Occupation: homemaker    Occupation: student:    Tobacco Use    Smoking status: Never     Passive exposure: Never    Smokeless tobacco: Never   Substance and Sexual " Activity    Alcohol use: Not Currently    Drug use: Not Currently    Sexual activity: Not Currently   Other Topics Concern    Parent/sibling w/ CABG, MI or angioplasty before 65F 55M? Not Asked   Social History Narrative    Not on file     Social Determinants of Health     Financial Resource Strain: Not on file   Food Insecurity: Not on file   Transportation Needs: Not on file   Physical Activity: Not on file   Stress: Not on file   Social Connections: Not on file   Interpersonal Safety: Not on file   Housing Stability: Not on file          Medications  Allergies   Current Outpatient Medications   Medication Sig Dispense Refill    adapalene (DIFFERIN) 0.1 % external cream Apply pea-sized amount to face at bedtime. Start every other night for 1-2 weeks, then increase to nightly as tolerated. 45 g 11    ASHWAGANDHA PO       azelaic acid (FINACIA) 15 % external gel Use 1-2 times daily to face 50 g 11    azelastine (ASTELIN) 0.1 % nasal spray Spray 1 spray into both nostrils daily as needed      dicyclomine (BENTYL) 20 MG tablet Take 1 tablet (20 mg) by mouth 4 times daily as needed (Abdominal pain, cramping) 30 tablet 0    EC-RX Testosterone 0.2 % CREA as directed topically Once a day      ketotifen (ZADITOR) 0.025 % ophthalmic solution Apply 1 drop to eye as needed      L-Theanine 200 MG CAPS       levothyroxine (SYNTHROID) 125 MCG tablet Take 1 tablet (125 mcg) by mouth daily 90 tablet 3    MAGNESIUM GLYCINATE PO Take 2 capsules by mouth daily      metoprolol succinate ER (TOPROL XL) 25 MG 24 hr tablet Take 0.5 tablets (12.5 mg) by mouth daily 30 tablet 6    Omega-3 Fatty Acids (FISH OIL) 1200 MG capsule Take 1,200 mg by mouth daily      progesterone (PROMETRIUM) 100 MG capsule Take 100 mg by mouth daily Takes at dinner      traZODone (DESYREL) 50 MG tablet trazodone 50 mg tablet   TAKE 1/2 TO 1 TABLET BY MOUTH AT BEDTIME      UNABLE TO FIND MEDICATION NAME: lukas      UNABLE TO FIND Take 22.5 mg by mouth daily  MEDICATION NAME: Zinc Bisglyconate      UNABLE TO FIND Apply topically 2 times daily MEDICATION NAME: Bi-estrogen (compound) BID      UNABLE TO FIND Take 1 tablet by mouth daily MEDICATION NAME: Zinc & Copper 15mg      vitamin D3 (CHOLECALCIFEROL) 50 mcg (2000 units) tablet Take 50 mcg by mouth daily 2000 units       Zinc 30 MG TABS 30 mg        Allergies   Allergen Reactions    Apple Other (See Comments)    Apple Juice Other (See Comments)     Skin on roof of mouth got sensitive and then she noted sometimes a piece of skin from the roof of the mouth fell off as a result of this.     Melon Other (See Comments)     Oral reaction     Mold     Morphine Nausea and Vomiting and Other (See Comments)    Nsaids Hives and Other (See Comments)    Other Environmental Allergy     Pear Other (See Comments)    Tomato Hives and Other (See Comments)         Lab Results    Chemistry/lipid CBC Cardiac Enzymes/BNP/TSH/INR   Recent Labs   Lab Test 05/29/22  1134 10/13/21  0957   TRIG  --  107   LDL  --  121   BUN 19 13    140   CO2 28 27    Recent Labs   Lab Test 10/19/22  1055   WBC 5.7   HGB 13.5   HCT 41.2   MCV 96       Recent Labs   Lab Test 07/07/23  1109   TSH 0.85        A total of 40 minutes was spent reviewing patient's medical records, obtaining history and performing examination, as well as discussing diagnoses/ recommendations with patient and answering all questions.                  Thank you for allowing me to participate in the care of your patient.      Sincerely,   Harper Turner MD   [facility]  cc: No referring provider defined for this encounter.

## 2023-10-27 ENCOUNTER — TELEPHONE (OUTPATIENT)
Dept: DERMATOLOGY | Facility: CLINIC | Age: 53
End: 2023-10-27
Payer: COMMERCIAL

## 2023-10-27 NOTE — TELEPHONE ENCOUNTER
Left Voicemail (2nd Attempt) for the patient to call back and schedule the following:    Appointment type: Return  Provider: Dr. Gomes  Return date: October 2024  Specialty phone number: [phone]

## 2023-12-11 ENCOUNTER — IMMUNIZATION (OUTPATIENT)
Dept: NURSING | Facility: CLINIC | Age: 53
End: 2023-12-11
Payer: COMMERCIAL

## 2023-12-11 PROCEDURE — 90480 ADMN SARSCOV2 VAC 1/ONLY CMP: CPT

## 2023-12-11 PROCEDURE — 91320 SARSCV2 VAC 30MCG TRS-SUC IM: CPT

## 2024-01-03 ENCOUNTER — MYC MEDICAL ADVICE (OUTPATIENT)
Dept: DERMATOLOGY | Facility: CLINIC | Age: 54
End: 2024-01-03
Payer: COMMERCIAL

## 2024-01-14 ENCOUNTER — HEALTH MAINTENANCE LETTER (OUTPATIENT)
Age: 54
End: 2024-01-14

## 2024-01-31 DIAGNOSIS — R41.89 COGNITIVE CHANGE: ICD-10-CM

## 2024-01-31 DIAGNOSIS — G90.9 AUTONOMIC DYSFUNCTION: Primary | ICD-10-CM

## 2024-02-02 ENCOUNTER — LAB (OUTPATIENT)
Dept: LAB | Facility: CLINIC | Age: 54
End: 2024-02-02
Payer: COMMERCIAL

## 2024-02-02 DIAGNOSIS — G90.9 AUTONOMIC DYSFUNCTION: ICD-10-CM

## 2024-02-02 DIAGNOSIS — R41.89 COGNITIVE CHANGE: ICD-10-CM

## 2024-02-02 LAB
FOLATE SERPL-MCNC: 12.2 NG/ML (ref 4.6–34.8)
HCYS SERPL-SCNC: 6.8 UMOL/L (ref 0–15)

## 2024-02-02 PROCEDURE — 99000 SPECIMEN HANDLING OFFICE-LAB: CPT

## 2024-02-02 PROCEDURE — 82746 ASSAY OF FOLIC ACID SERUM: CPT

## 2024-02-02 PROCEDURE — 36415 COLL VENOUS BLD VENIPUNCTURE: CPT

## 2024-02-02 PROCEDURE — 83090 ASSAY OF HOMOCYSTEINE: CPT

## 2024-02-05 LAB
ACYLCARNITINE SERPL-SCNC: 6 UMOL/L
CARN ESTERS/C0 SERPL-SRTO: 0.2 {RATIO}
CARNITINE FREE SERPL-SCNC: 30 UMOL/L
CARNITINE SERPL-SCNC: 36 UMOL/L

## 2024-02-19 DIAGNOSIS — N95.1 SYMPTOMATIC MENOPAUSAL OR FEMALE CLIMACTERIC STATES: Primary | ICD-10-CM

## 2024-03-11 ENCOUNTER — ANCILLARY PROCEDURE (OUTPATIENT)
Dept: MAMMOGRAPHY | Facility: CLINIC | Age: 54
End: 2024-03-11
Attending: STUDENT IN AN ORGANIZED HEALTH CARE EDUCATION/TRAINING PROGRAM
Payer: COMMERCIAL

## 2024-03-11 DIAGNOSIS — Z12.31 VISIT FOR SCREENING MAMMOGRAM: ICD-10-CM

## 2024-03-11 PROCEDURE — 77067 SCR MAMMO BI INCL CAD: CPT | Mod: TC | Performed by: RADIOLOGY

## 2024-03-22 ENCOUNTER — OFFICE VISIT (OUTPATIENT)
Dept: URGENT CARE | Facility: URGENT CARE | Age: 54
End: 2024-03-22
Payer: COMMERCIAL

## 2024-03-22 VITALS
SYSTOLIC BLOOD PRESSURE: 112 MMHG | DIASTOLIC BLOOD PRESSURE: 73 MMHG | HEART RATE: 81 BPM | TEMPERATURE: 97 F | RESPIRATION RATE: 20 BRPM | OXYGEN SATURATION: 98 %

## 2024-03-22 DIAGNOSIS — R68.2 DRY MOUTH: ICD-10-CM

## 2024-03-22 DIAGNOSIS — R00.2 PALPITATIONS: Primary | ICD-10-CM

## 2024-03-22 LAB
ALBUMIN SERPL-MCNC: 3.5 G/DL (ref 3.4–5)
ALP SERPL-CCNC: 73 U/L (ref 40–150)
ALT SERPL W P-5'-P-CCNC: 17 U/L (ref 0–50)
ANION GAP SERPL CALCULATED.3IONS-SCNC: 10 MMOL/L (ref 3–14)
AST SERPL W P-5'-P-CCNC: 21 U/L (ref 0–45)
BILIRUB SERPL-MCNC: 0.6 MG/DL (ref 0.2–1.3)
BUN SERPL-MCNC: 17 MG/DL (ref 7–30)
CALCIUM SERPL-MCNC: 10 MG/DL (ref 8.5–10.1)
CHLORIDE BLD-SCNC: 106 MMOL/L (ref 94–109)
CO2 SERPL-SCNC: 28 MMOL/L (ref 20–32)
CREAT SERPL-MCNC: 1 MG/DL (ref 0.52–1.04)
EGFRCR SERPLBLD CKD-EPI 2021: 67 ML/MIN/1.73M2
ERYTHROCYTE [DISTWIDTH] IN BLOOD BY AUTOMATED COUNT: 11.9 % (ref 10–15)
ERYTHROCYTE [SEDIMENTATION RATE] IN BLOOD BY WESTERGREN METHOD: 7 MM/HR (ref 0–30)
GLUCOSE BLD-MCNC: 100 MG/DL (ref 70–99)
HCT VFR BLD AUTO: 42.6 % (ref 35–47)
HGB BLD-MCNC: 13.9 G/DL (ref 11.7–15.7)
MCH RBC QN AUTO: 31.4 PG (ref 26.5–33)
MCHC RBC AUTO-ENTMCNC: 32.6 G/DL (ref 31.5–36.5)
MCV RBC AUTO: 96 FL (ref 78–100)
PLATELET # BLD AUTO: 201 10E3/UL (ref 150–450)
POTASSIUM BLD-SCNC: 4.3 MMOL/L (ref 3.4–5.3)
PROT SERPL-MCNC: 7.1 G/DL (ref 6.8–8.8)
RBC # BLD AUTO: 4.42 10E6/UL (ref 3.8–5.2)
SODIUM SERPL-SCNC: 144 MMOL/L (ref 135–145)
WBC # BLD AUTO: 7 10E3/UL (ref 4–11)

## 2024-03-22 PROCEDURE — 93000 ELECTROCARDIOGRAM COMPLETE: CPT | Performed by: PHYSICIAN ASSISTANT

## 2024-03-22 PROCEDURE — 80053 COMPREHEN METABOLIC PANEL: CPT | Performed by: PHYSICIAN ASSISTANT

## 2024-03-22 PROCEDURE — 85652 RBC SED RATE AUTOMATED: CPT | Performed by: PHYSICIAN ASSISTANT

## 2024-03-22 PROCEDURE — 99213 OFFICE O/P EST LOW 20 MIN: CPT | Mod: 25 | Performed by: PHYSICIAN ASSISTANT

## 2024-03-22 PROCEDURE — 36415 COLL VENOUS BLD VENIPUNCTURE: CPT | Performed by: PHYSICIAN ASSISTANT

## 2024-03-22 PROCEDURE — 85027 COMPLETE CBC AUTOMATED: CPT | Performed by: PHYSICIAN ASSISTANT

## 2024-03-22 PROCEDURE — 83735 ASSAY OF MAGNESIUM: CPT | Performed by: PHYSICIAN ASSISTANT

## 2024-03-22 NOTE — PROGRESS NOTES
Assessment & Plan     1. Palpitations  - EKG 12-lead complete w/read - Clinics    2. Dry mouth  - CBC with platelets; Future  - Comprehensive metabolic panel; Future  - Magnesium; Future  - ESR: Erythrocyte sedimentation rate; Future  - CBC with platelets  - Comprehensive metabolic panel  - Magnesium  - ESR: Erythrocyte sedimentation rate      Ongoing feelings of dehydration with dry mouth, dry eyes and tachycardia.  Intermittent lightheadedness.  On examination, she appears well.  Vital signs are stable.  No laboratory evidence of leukocytosis, anemia, electrolyte abnormalities, acute kidney injury etc.  Perhaps related to the dry air outside.  We discussed adequate hydration with water as well as applying Vaseline to the lips and inside the naris.  Follow-up with the PCP for ongoing symptoms.    Return in about 2 weeks (around 4/5/2024) for PCP.    Diagnosis and treatment plan was reviewed with patient and/or family.   We went over any labs or imaging. Discussed worsening symptoms or little to no relief despite treatment plan to follow-up with PCP or return to clinic.  Patient verbalizes understanding. All questions were addressed and answered.     Alida Cali PA-C  [facility]    CHIEF COMPLAINT:   Chief Complaint   Patient presents with    Palpitations     Ongoing since  visit 10/10 for tachycardia/ear wax-sx getting worse-lightheaded, feels dehydrated     Subjective     Carmel is a 53 year old female who presents to clinic today for evaluation of dry mouth, lightheadedness and feeling dehydrated.     Concerned that she may be dehydrated. Feeling dry in her mouth as well, that her skin is papery and that her veins are not as well pronounced.     Drinking plain water will at times will make her more thirsty.     She will sometimes have muscle cramps at night.     Feels that her SVT may be getting worse. She sees cardiology for this issue. Has not found source of her symptoms.      She has been taking 25mg of trazodone at night, she thinks that it may be related to this.   No antihistamine use.       Past Medical History:   Diagnosis Date    Auditory processing disorder 2021    Autonomic dysfunction 2021    Bruxism 2021    Female stress incontinence 2020    NAPOLEON (generalized anxiety disorder)     Hypoglycemia 2021    Hypothyroid     Hypothyroidism 2021    IBS (irritable bowel syndrome)     Insomnia 2021    Irritable bowel syndrome 2021    mild MICHAEL (obstructive sleep apnea)     Plantar fasciitis 2021    PVC's (premature ventricular contractions) 2021    Just noted on holter    PVD (posterior vitreous detachment), bilateral 2021    Rosacea 2021    Seasonal allergic rhinitis 2021    SVT (supraventricular tachycardia)     Syncope 2021    Syringomyelia (H) 2021    Vasovagal reaction      Past Surgical History:   Procedure Laterality Date     SECTION      WISDOM TOOTH EXTRACTION       Social History     Tobacco Use    Smoking status: Never     Passive exposure: Never    Smokeless tobacco: Never   Substance Use Topics    Alcohol use: Not Currently     Current Outpatient Medications   Medication    adapalene (DIFFERIN) 0.1 % external cream    ASHWAGANDHA PO    azelaic acid (FINACIA) 15 % external gel    azelastine (ASTELIN) 0.1 % nasal spray    EC-RX Testosterone 0.2 % CREA    Folic Acid (FOLATE PO)    ketotifen (ZADITOR) 0.025 % ophthalmic solution    levothyroxine (SYNTHROID) 125 MCG tablet    MAGNESIUM GLYCINATE PO    Omega-3 Fatty Acids (FISH OIL) 1200 MG capsule    progesterone (PROMETRIUM) 100 MG capsule    traZODone (DESYREL) 50 MG tablet    UNABLE TO FIND    UNABLE TO FIND    UNABLE TO FIND    vitamin D3 (CHOLECALCIFEROL) 50 mcg (2000 units) tablet    Zinc 30 MG TABS    Cyanocobalamin (B-12) 1000 MCG CAPS    dicyclomine (BENTYL) 20 MG tablet    L-Theanine 200 MG CAPS    metoprolol succinate ER (TOPROL XL) 25  MG 24 hr tablet    UNABLE TO FIND     No current facility-administered medications for this visit.     Allergies   Allergen Reactions    Apple Other (See Comments)    Apple Juice Other (See Comments)     Skin on roof of mouth got sensitive and then she noted sometimes a piece of skin from the roof of the mouth fell off as a result of this.     Melon Other (See Comments)     Oral reaction     Mold     Morphine Nausea and Vomiting and Other (See Comments)    Nsaids Hives and Other (See Comments)    Other Environmental Allergy     Pear Other (See Comments)    Tomato Hives and Other (See Comments)       10 point ROS of systems were all negative except for pertinent positives noted in my HPI.      Exam:   /73   Pulse 81   Temp 97  F (36.1  C)   Resp 20   SpO2 98%   Constitutional: healthy, alert and no distress  Head: Normocephalic, atraumatic.  Eyes: conjunctiva clear, no drainage  ENT: MMM. TMs clear and shiny geri, nasal mucosa pink and moist, throat without tonsillar hypertrophy or erythema  Neck: neck is supple, no cervical lymphadenopathy or nuchal rigidity  Cardiovascular: RRR  Respiratory: CTA bilaterally, no rhonchi or rales  Gastrointestinal: soft and nontender  Skin: no rashes  Neurologic: Speech clear, gait normal. Moves all extremities.    Results for orders placed or performed in visit on 03/22/24   CBC with platelets     Status: Normal   Result Value Ref Range    WBC Count 7.0 4.0 - 11.0 10e3/uL    RBC Count 4.42 3.80 - 5.20 10e6/uL    Hemoglobin 13.9 11.7 - 15.7 g/dL    Hematocrit 42.6 35.0 - 47.0 %    MCV 96 78 - 100 fL    MCH 31.4 26.5 - 33.0 pg    MCHC 32.6 31.5 - 36.5 g/dL    RDW 11.9 10.0 - 15.0 %    Platelet Count 201 150 - 450 10e3/uL   Comprehensive metabolic panel     Status: Abnormal   Result Value Ref Range    Sodium 144 135 - 145 mmol/L    Potassium 4.3 3.4 - 5.3 mmol/L    Chloride 106 94 - 109 mmol/L    Carbon Dioxide (CO2) 28 20 - 32 mmol/L    Anion Gap 10 3 - 14 mmol/L    Urea  Nitrogen 17 7 - 30 mg/dL    Creatinine 1.00 0.52 - 1.04 mg/dL    GFR Estimate 67 >60 mL/min/1.73m2    Calcium 10.0 8.5 - 10.1 mg/dL    Glucose 100 (H) 70 - 99 mg/dL    Alkaline Phosphatase 73 40 - 150 U/L    AST 21 0 - 45 U/L    ALT 17 0 - 50 U/L    Protein Total 7.1 6.8 - 8.8 g/dL    Albumin 3.5 3.4 - 5.0 g/dL    Bilirubin Total 0.6 0.2 - 1.3 mg/dL   ESR: Erythrocyte sedimentation rate     Status: Normal   Result Value Ref Range    Erythrocyte Sedimentation Rate 7 0 - 30 mm/hr

## 2024-03-23 LAB — MAGNESIUM SERPL-MCNC: 2.2 MG/DL (ref 1.7–2.3)

## 2024-04-24 ENCOUNTER — LAB (OUTPATIENT)
Dept: LAB | Facility: CLINIC | Age: 54
End: 2024-04-24
Payer: COMMERCIAL

## 2024-04-24 DIAGNOSIS — N95.1 SYMPTOMATIC MENOPAUSAL OR FEMALE CLIMACTERIC STATES: ICD-10-CM

## 2024-04-24 PROCEDURE — 84403 ASSAY OF TOTAL TESTOSTERONE: CPT

## 2024-04-24 PROCEDURE — 36415 COLL VENOUS BLD VENIPUNCTURE: CPT

## 2024-04-24 PROCEDURE — 83001 ASSAY OF GONADOTROPIN (FSH): CPT

## 2024-04-24 PROCEDURE — 82670 ASSAY OF TOTAL ESTRADIOL: CPT

## 2024-04-25 LAB
ESTRADIOL SERPL-MCNC: 5 PG/ML
FSH SERPL IRP2-ACNC: 97.8 MIU/ML

## 2024-04-26 LAB — TESTOST SERPL-MCNC: 15 NG/DL (ref 8–60)

## 2024-05-09 DIAGNOSIS — N95.1 SYMPTOMATIC MENOPAUSAL OR FEMALE CLIMACTERIC STATES: Primary | ICD-10-CM

## 2024-08-21 ENCOUNTER — LAB (OUTPATIENT)
Dept: LAB | Facility: CLINIC | Age: 54
End: 2024-08-21
Payer: COMMERCIAL

## 2024-08-21 DIAGNOSIS — N95.1 SYMPTOMATIC MENOPAUSAL OR FEMALE CLIMACTERIC STATES: ICD-10-CM

## 2024-08-21 LAB
ESTRADIOL SERPL-MCNC: 6 PG/ML
FSH SERPL IRP2-ACNC: 91.3 MIU/ML

## 2024-08-21 PROCEDURE — 83001 ASSAY OF GONADOTROPIN (FSH): CPT

## 2024-08-21 PROCEDURE — 84403 ASSAY OF TOTAL TESTOSTERONE: CPT

## 2024-08-21 PROCEDURE — 36415 COLL VENOUS BLD VENIPUNCTURE: CPT

## 2024-08-21 PROCEDURE — 82670 ASSAY OF TOTAL ESTRADIOL: CPT

## 2024-08-23 LAB — TESTOST SERPL-MCNC: 18 NG/DL (ref 8–60)

## 2024-09-14 ENCOUNTER — MYC MEDICAL ADVICE (OUTPATIENT)
Dept: CARDIOLOGY | Facility: CLINIC | Age: 54
End: 2024-09-14
Payer: COMMERCIAL

## 2024-09-16 ENCOUNTER — NURSE TRIAGE (OUTPATIENT)
Dept: CARDIOLOGY | Facility: CLINIC | Age: 54
End: 2024-09-16

## 2024-09-16 DIAGNOSIS — I47.10 SVT (SUPRAVENTRICULAR TACHYCARDIA) (H): ICD-10-CM

## 2024-09-16 DIAGNOSIS — I47.10 PAROXYSMAL SUPRAVENTRICULAR TACHYCARDIA (H): ICD-10-CM

## 2024-09-16 DIAGNOSIS — R00.2 PALPITATIONS: Primary | ICD-10-CM

## 2024-09-16 NOTE — TELEPHONE ENCOUNTER
"1. REASON FOR CALL or QUESTION: \"What is your reason for calling today?\" or \"How can I best help you?\" or \"What question do you have that I can help answer?\"    [facility] regarding symptoms.  Patient states Saturday her smart watch was notifying her of A-Fib. She states this morning her watch is showing \"poor recording.\" Her watch doesn't show a clear recording. Her current heart rate is 91 bpm. Since Saturday she has been having difficulty with sleep. When she wakes in the morning she will have a sudden surge of \"energy.\" Her heart rates will be elevated and will have loose stools. She gets stress sweats. Her heart rates will be increased with eating. She will get the feeling of being \"tipsy.\" At the moment she states she feels \"jittery.\" She feels like she is on overdrive when she wakes up and then symptoms wear off. She presently feels as though she is coming down from a surge of energy.   Patient has concern that this is occurring each morning, during the day, and in the evening. She feels it worse in the morning. She feels a little short of breath during episodes when she wakes. At present, she does not have dizziness or any chest pain.   Patient states it was suggested she may have POTS. She has recordings from her watch she can submit.   Advised this will be routed to the nurses with Harper Turner MD to further review/follow-up. Patient verbalized understanding.  "

## 2024-09-16 NOTE — TELEPHONE ENCOUNTER
Dr. Turner,  Please see nurse triage note below.  No follow up is schedule.  Do you have any new orders or recommendations?  Thank you,  Galilea

## 2024-09-18 NOTE — TELEPHONE ENCOUNTER
EP DILLAN follow up order placed and message sent to scheduling to arrange consult.    ----- Message -----   From: Harper Turner MD   Sent: 9/18/2024   7:29 AM CDT   To: Galilea Perez RN     I had suggested referral to EP last year if symptoms persisted so would have her seen by EP DILLAN. If they are severe, she should go to ED.     LINDEN

## 2024-10-21 ENCOUNTER — TRANSFERRED RECORDS (OUTPATIENT)
Dept: HEALTH INFORMATION MANAGEMENT | Facility: CLINIC | Age: 54
End: 2024-10-21

## 2024-10-23 NOTE — PROGRESS NOTES
[facility] Dermatology Note  Encounter Date: Oct 25, 2024  Office Visit     Dermatology Problem List:  Last skin check 10/25/24    # Telogen effluvium  # Rosacea  - Current tx: azelaic acid 15%  # Acne.  - BP 5% wash, adapalene 0.1% cream  # Notalgia paresthetica  - Stretching exercises  # Verruca plantaris, right 1st toe  - s/p cryotherapy 10/20/23, 10/25/2024    # LTM, right supra nasal tip  - notify for any changes    ____________________________________________    Assessment & Plan:    # LTM, right supra nasal tip - appears reassuring and unchanged from prior  - notify for any changes    # Rosacea, chronic, well controlled on azelaic acid.   - Continue azelaic acid 15% gel. Refill provided today.  - Future considerations: topical abx     # Acne vulgaris, chronic, stable.   - can continue BP 4% wash  - can continue adapalene 0.1% cream.  - Future considerations: tretinoin 0.025% cream    # Verruca plantaris, right 1st toe . Ddx includes corn, discussed difficulty in differentiation but reassuringly treatment is similar.   - Cryotherapy performed today (see procedure note(s) below).  - Recommended salicylic acid 40%     # Multiple benign nevi.   - Monitor for ABCDEs of melanoma   - Continue sun protection - recommend SPF 30 or higher with frequent application   - Return sooner if noticing changing or symptomatic lesions     # Benign lesions - cherry angiomas, lentigenes.  - No treatment required     Procedures Performed:   Cryotherapy procedure note: After verbal consent and discussion of risks and benefits including but no limited to dyspigmentation/scar, blister, and pain, 1 wart was(were) treated with 1-2mm freeze border for 2 cycles with liquid nitrogen. Post cryotherapy instructions were provided.      Follow-up: 1 year(s) in-person, or earlier for new or changing lesions    Staff and Scribe:     Scribe Disclosure:   Galilea WATTERS, am serving as a scribe; to document services  personally performed by Reyna Gomes MD -based on data collection and the provider's statements to me.     Provider Disclosure:   The documentation recorded by the scribe accurately reflects the services I personally performed and the decisions made by me.    Reyna Gomes MD    Department of Dermatology  [facility]: Phone: [phone], Fax: [phone]  10/28/2024         ____________________________________________    CC: Derm Problem (FBSE - no areas of concern to pt)    HPI:  Ms. Carmel Fischer is a(n) 53 year old female who presents today as a return patient for above.    The patient reports that she has been doing well on her treatment for rosacea. The azelaic acid has overall been keeping her flare free. She has previously had a wart on her foot that she had frozen off at her last visit, she is interested in getting this spot frozen off again. She notes her PCP thought this area was a corn, rather than a wart. She states that she has noticed white spots on this area.     Patient is otherwise feeling well, without additional skin concerns.    Labs Reviewed:  N/A    Physical exam:  Vitals: There were no vitals taken for this visit.  GEN: This is a well developed, well-nourished female in no acute distress, in a pleasant mood.    SKIN: Total skin excluding the undergarment areas was performed. The exam included the head/face, neck, both arms, chest, back, abdomen, both legs, digits and/or nails.   - R 1st dorsal toe, bland flesh colored hyperkeratotic papule   -  2 mm bland hyperpigmented papule without vessels on right nasal supra tip.   - There are dome shaped bright red papules on the trunk and extremities .   - Multiple regular brown pigmented macules and papules are identified on the trunk and extremities.  - Scattered brown macules on sun exposed areas.  - No other lesions of concern on areas examined.        Medications:  Current Outpatient Medications   Medication Sig Dispense Refill    adapalene (DIFFERIN) 0.1 % external cream Apply pea-sized amount to face at bedtime. Start every other night for 1-2 weeks, then increase to nightly as tolerated. 45 g 11    ASHWAGANDHA PO       azelaic acid (FINACIA) 15 % external gel Use 1-2 times daily to face 50 g 11    azelastine (ASTELIN) 0.1 % nasal spray Spray 1 spray into both nostrils daily as needed      Cyanocobalamin (B-12) 1000 MCG CAPS B12      EC-RX Testosterone 0.2 % CREA as directed topically Once a day      ketotifen (ZADITOR) 0.025 % ophthalmic solution Apply 1 drop to eye as needed      l-tyrosine 500 MG TABS Take by mouth.      levothyroxine (SYNTHROID) 125 MCG tablet Take 1 tablet (125 mcg) by mouth daily 90 tablet 3    MAGNESIUM GLYCINATE PO Take 2 capsules by mouth daily      Omega-3 Fatty Acids (FISH OIL) 1200 MG capsule Take 1,200 mg by mouth daily      progesterone (PROMETRIUM) 100 MG capsule Take 100 mg by mouth daily Takes at dinner      traZODone (DESYREL) 50 MG tablet trazodone 50 mg tablet   TAKE 1/2 TO 1 TABLET BY MOUTH AT BEDTIME      UNABLE TO FIND Take 22.5 mg by mouth daily MEDICATION NAME: Zinc Bisglyconate      UNABLE TO FIND Apply topically 2 times daily MEDICATION NAME: Bi-estrogen (compound) BID      UNABLE TO FIND Take 1 tablet by mouth daily MEDICATION NAME: Zinc & Copper 15mg      vitamin D3 (CHOLECALCIFEROL) 50 mcg (2000 units) tablet Take 50 mcg by mouth daily 2000 units       Zinc 30 MG TABS 30 mg       No current facility-administered medications for this visit.      Past Medical History:   Patient Active Problem List   Diagnosis    Female stress incontinence    NAPOLEON (generalized anxiety disorder)    Insomnia    Autonomic dysfunction    Paroxysmal supraventricular tachycardia (H)    Syncope    Hypothyroidism    Irritable bowel syndrome    Syringomyelia (H)    Hypoglycemia    Rosacea    Seasonal allergic rhinitis    Bruxism    PVC's  (premature ventricular contractions)    Plantar fasciitis    PVD (posterior vitreous detachment), bilateral    Auditory processing disorder    Palpitations     Past Medical History:   Diagnosis Date    Auditory processing disorder 7/26/2021    Autonomic dysfunction 7/26/2021    Bruxism 7/26/2021    Female stress incontinence 9/24/2020    NAPOLEON (generalized anxiety disorder)     Hypoglycemia 7/26/2021    Hypothyroid     Hypothyroidism 7/26/2021    IBS (irritable bowel syndrome)     Insomnia 7/26/2021    Irritable bowel syndrome 7/26/2021    mild MICHAEL (obstructive sleep apnea)     Plantar fasciitis 7/26/2021    PVC's (premature ventricular contractions) 7/26/2021    Just noted on holter    PVD (posterior vitreous detachment), bilateral 7/26/2021    Rosacea 7/26/2021    Seasonal allergic rhinitis 7/26/2021    SVT (supraventricular tachycardia) (H)     Syncope 7/26/2021    Syringomyelia (H) 7/26/2021    Vasovagal reaction         CC No referring provider defined for this encounter. on close of this encounter.

## 2024-10-24 ENCOUNTER — ORDERS ONLY (AUTO-RELEASED) (OUTPATIENT)
Dept: CARDIOLOGY | Facility: CLINIC | Age: 54
End: 2024-10-24

## 2024-10-24 ENCOUNTER — OFFICE VISIT (OUTPATIENT)
Dept: CARDIOLOGY | Facility: CLINIC | Age: 54
End: 2024-10-24
Attending: INTERNAL MEDICINE
Payer: COMMERCIAL

## 2024-10-24 VITALS
SYSTOLIC BLOOD PRESSURE: 90 MMHG | HEART RATE: 84 BPM | BODY MASS INDEX: 20.61 KG/M2 | WEIGHT: 136 LBS | HEIGHT: 68 IN | DIASTOLIC BLOOD PRESSURE: 58 MMHG | RESPIRATION RATE: 16 BRPM

## 2024-10-24 DIAGNOSIS — R00.2 PALPITATIONS: ICD-10-CM

## 2024-10-24 DIAGNOSIS — I47.10 PAROXYSMAL SUPRAVENTRICULAR TACHYCARDIA (H): ICD-10-CM

## 2024-10-24 PROCEDURE — 99215 OFFICE O/P EST HI 40 MIN: CPT | Performed by: NURSE PRACTITIONER

## 2024-10-24 RX ORDER — TYROSINE 500 MG
TABLET ORAL
COMMUNITY

## 2024-10-24 NOTE — PATIENT INSTRUCTIONS
Carmel Fischer,    It was a pleasure to see you today at the [facility].     My recommendations after this visit include:    Do a trial off l-tyrosine    Aggressive daily hydration: 70-80 ounces daily  Increase salt/sodium intake    Zio patch monitor x 2 weeks to document/identify arrhythmia    Sue Castillo CNP  [facility], Electrophysiology  [phone]  EP nurses [phone]      General Information On Neurally Mediated Hypotension and Its Treatment     1. What is neurally mediated hypotension?   Neurally mediated hypotension (NMH), is also known by the following names: the fainting reflex, neurocardiogenic syncope, vasodepressor syncope, the vaso-vagal reflex, and autonomic dysfunction. Hypotension is the formal medical term for low blood pressure, and syncope is the term for fainting. NMH occurs when there is an abnormal reflex interaction between the heart and the brain. This does not mean there is any heart or brain damage.     2. When does neurally mediated hypotension lead to symptoms?   Neurally mediated hypotension occurs in susceptible individuals in the following settings:   After prolonged periods of quiet, upright posture (such as standing in line, standing in a shower, or even sitting up for long periods);   After being in a warm environment (such as in hot summer weather, a hot crowded room, a hot shower or bath);   Immediately after exercise   After emotionally stressful events (seeing blood or gory scenes, being scared or anxious)  Some individuals have symptoms soon after eating, when blood flow has shifted to the intestinal circulation during the process of digestion; and   If a person becomes dehydrated     We are all susceptible to activation of the vaso-vagal reflex that results in lowered   blood pressure, but each person's susceptibility is affected by his or her genetic make- up, dietary factors, psychological make-up, and acute triggers such  "as infection and allergy. The clinical problem of NMH occurs when there is sufficiently early triggering of this reflex to cause severe and/or frequent symptoms.     3. How does upright posture lead to these problems?   After an individual stands up, blood pools in the legs through the effect of gravity. To compensate for the lower amount of blood returning to the heart immediately after standing, the body has a surge of adrenaline (epinephrine). This adrenaline surge leads to a faster heart rate and to more vigorous heart beats (for example, a familiar feeling we all experience when we are frightened). The faster heart rate and more vigorous heart contractions allow the reduced amount of blood returning to the heart to be pumped more efficiently to vital organs (especially the brain).     In individuals with neurally mediated hypotension, there is a \"miscommunication\" between the heart and the brain. At a time when the heart needs to beat faster and the blood vessels need to constrict, the brain sends out the message that the blood vessels in the arms and legs should dilate. When the blood vessels dilate, there is even more blood taken away from the central part of the circulation where it is needed. When this happens, a person may feel lightheaded or faint, because not enough blood is getting to the brain. Fainting, in a sense, is protective. When a person faints, the pooling effect of gravity is gone, allowing blood to return to the heart, and therefore pumping it back to the brain. Following lightheadedness or syncope, many individuals feel tired and their mental abilities are somewhat foggy.     4. Which symptoms can be caused by the neurally mediated hypotension? Recurrent lightheadedness and fainting are common symptoms, as is an unusual difficulty with prolonged fatigue after a modest amount of physical activity. This post-exertional fatigue can last 24-72 hours, and interferes with many daily activities. " "    We have also observed that chronic fatigue, muscle aches (or fibromyalgia), headaches, and mental confusion can be prominent symptoms of neurally mediated hypotension even in individuals who do not faint. The mental confusion takes the form of difficulty concentrating, staying on task, paying attention, or finding the right words. Some describe being in a \"mental fog.\" It appears that as long as the fainting reflex is activated whenever the person stands or sits upright for a period of time, then the blood pressure is improperly regulated, and these symptoms result. Some develop worse fatigue after such activities as reading and concentrating, and this may be due to the fact that for some, the veins of the arms and legs dilate, thereby allowing more blood to pool, rather than constricting in response to mental tasks.     5. How is neurally mediated hypotension diagnosed?   Neurally mediated hypotension cannot be detected with a routine blood pressure or heart rate screening. The diagnosis can be made using a prolonged standing test or more commonly using a tilt table test. Many hospitals and academic centers throughout the world perform tilt table testing. It allows careful measurement of the heart rate and blood pressure responses to the head-up position at a 7a-degree angle, (almost standing upright position). Many people with NMH develop adaptations to keep from fainting, such as crossing their legs, fidgeting, or sitting or lying down when they get lightheaded or tired, but the tilt table test provides a safe test while preventing them from performing these natural defenses. As a result, lightheadedness, nausea, and fainting may occur during the tilt table test. Fatigue and malaise may occur for a few hours to days after the test is performed.     6. How is neurally mediated hypotension treated?   Neurally mediated hypotension is most often treated with a combination of increased salt and water intake in " conjunction with drugs that regulate blood pressure and heart rate. Some drugs work by allowing the kidneys to retain sodium and others block the body's response to adrenaline, which can kick-start the blood pressure abnormality. It is also important to review your current medications with your physician, to ensure that these medications do not include drugs or vitamins that have the potential to make NMH worse.     We want to emphasize, however, that the treatments require persistence, commitment, and the willingness to try several possible drugs and combinations over an extended period of time. Your physician will work with you to determine the best possible medicine(s) for your situation. In general, however, the first step in treating this problem is to increase fluid intake. We cannot stress this enough. Our patients who have discovered the importance of drinking fluids regularly throughout the day, seem to do better than those who don't take this recommendation seriously.     For those who have been on a low salt intake, we recommend an increase in the amount of salt they add to their food, (with the exception of those patients with high blood pressure). The Appendix to this document contains a list of high salt foods, but specific foods are now conveniently labeled with sodium content for you to check.     To be successful though, the increased salt intake must be accompanied by a sufficient increase in the intake of water and other fluids (minimum of 2 liters of fluid per day). We want to emphasize strongly that a key part of the therapy is to increase fluid intake. We recommend that you drink 2 liters of fluid every day. Some people find sports drinks (which have the advantage of a higher sodium content) to be a good source of fluids.     7. What other things can I do to get better?   Where practical, avoid circumstances which might bring on symptoms. For example, shop at non-peak hours to avoid long lines.  "Take shorter showers and baths, and aim for a cooler water temperature. Avoid saunas, hot tubs, and lying on a hot beach. Avoid standing still for prolonged periods in hot environments, and on very hot days. Flex your leg muscles and shift your weight when you are standing still. You may also want to avoid alcohol, because it often leads to dilation of the veins, and this can \"steal\" blood away from the central circulation. Most individuals with NMH are intolerant of alcohol. Caffeine intake (including caffeine in soft drinks) can also affect people with NMH in an adverse way. Caffeine can dilate blood vessels and it causes the kidneys to get rid of more fluid.     8. Does treatment cure the problem?   It needs to be emphasized that, when successful, the medications for neurally mediated hypotension do not cure the problem. Rather, they help control symptoms. When medications are stopped, and when salt intake is reduced, symptoms frequently reappear. Many individuals with the problem also have symptoms resurface or worsen at busy or stressful times (making an oral presentation in class, rushing for a meeting on a hot day, and forgetting to drink fluids). Many women describe a worsening of symptoms in the days around the start of a menstrual period.     9. What causes neurally mediated hypotension?   The answer to this question isn't well understood at present, but we suspect that NMH has genetic origins in many people. It is not uncommon for us to find several individuals in the same family with NMH. No specific gene for this condition has been identified.     One of the most common, and treatable problems identified in those with NMH is a low salt (sodium) intake in the diet. Salt helps us retain fluid in the blood vessels, and helps maintain a healthy blood pressure. Salt has received bad press in the last couple decades because a high salt diet in some individuals with high or high-normal blood pressure can " "contribute to further elevations in blood pressure, and thereby to heart disease and stroke. This has led to general health recommendations to \"cut down on salt.\" This general recommendation isn't right for all people.     An average adult blood pressure is about 120/70, and a blood pressure is considered elevated if it is above 140/90. Individuals can have NMH at a wide range of resting blood pressures. It may be slightly more common in those whose systolic blood pressure (the top number) is in the  range, but we also see it in those whose resting blood pressure is high. For individuals with NMH, a low salt intake may be unhealthy, and may move them from feeling good to developing the symptoms of fatigue and lightheadedness described earlier.       Suggestions for a High Sodium Diet   An adult requires between 2000 and 3000 milligrams (mg) of sodium to maintain health. Although health advice in the last two decades has suggested that a low salt intake helps prevent heart disease and stroke, many individuals with neurally mediated hypotension, including those who begin with a low normal blood pressure, cannot tolerate this low salt diet. We believe that individuals with NMH need to take in much higher amounts of salt.      The following are high salt foods to help with your needs:   Breads and cereals: Mg Sodium:    Noodles, potatoes, rice from instant mixes 500   Wheaties (1 cup) 400   Waffles (1) 355   All Bran (1/2 cup) 285     Cheerios (1 cup) 260   Rice Krispies (1 cup) 260   Saltine crackers (6) 200     Dairy products:  Mg Sodium:    Parmesan cheese (1 oz.) 450   Processed cheese and cheese spreads (1 oz.) 320   Cottage cheese (1/2 cup) 230     Fruits and vegetables:  Mg Sodium:    Dill pickle (1) 1430   Tomato juice (8 oz.) 800   Sweet pickle (1) 570   Frozen vegetables (1/2 cup) 375   Canned tomato sauce and puree (1/4 cup) 370   Canned vegetables (1/2 cup) 245       Revised: 1-2015      "

## 2024-10-24 NOTE — PROGRESS NOTES
HEART CARE ELECTROPHYSIOLOGY CONSULTATON NOTE      [facility]  [phone]    Thank you, Dr. Turner, for asking the [facility] Electrophysiology team to see Ms. Carmel Fischer to evaluate palpitations.    Assessment/Recommendations   Assessment/Plan:  1.  Palpitations: Previous MCT's have documented very brief runs of AT, longest 14 beats.  No documentation of sustained arrhythmia.  -- Zio patch monitor x 14 days  -- Consideration for ILR implant    2.  Vasovagal presyncope/autonomic dysfunction: Mild episodes, no denisse syncope.  She was given information to review at home.  -- Recommend aggressive daily hydration with a goal of 70 to 80 ounces daily of noncaffeinated, nonalcoholic beverage, more on hot days or with strenuous activity.    -- Liberalize sodium intake.      Follow up pending monitor results     History of Present Illness/Subjective    HPI: Carmel Fischer is a 53 year old female who comes in today for EP consultation of PSVT/palpitations.  She has a history of paroxysmal atrial tachycardia, PACs, rare PVCs, vasovagal presyncope, mild MICHAEL, anxiety, IBS.      Palpitations have been previously suppressed with low-dose propranolol, which she is also on for mood stabilization.  She is no longer taking propranolol as she prefers to use natural supplements, guided by a functional medicine practitioner.  She had more palpitations and hot flashes with L-theanine.  She is now trying I tyrosine.  In the past couple of months, she has noted more frequent episodes of palpitations.  On 3 or 4 occasions, she has felt like her heart stopped briefly associated with presyncope.  She has also had clusters of abnormal beats, skips, brief tachypalpitations.  She appears to have more episodes when having abdominal pressure or gas, or with getting lightheadedness upon position change.  She denies chest discomfort, palpitations, abdominal fullness/bloating or peripheral edema,  HPI  Iris Hyde is a 55 y.o. female who is here for follow up of hypothyroidism.  Patient reports flareup of her gastroparesis and thinks she may be lactose intolerant?  Does seem to be doing better at this time.  Does report some muscle spasms especially in the feet and general puffiness.  Recently had thyroid supplement increased and will recheck levels.  Ongoing issues with fibromyalgia and chronic pain.      Review of Systems   Musculoskeletal: Positive for myalgias.   All other systems reviewed and are negative.        Past Medical History:   Diagnosis Date   • Alkaline phosphatase elevation    • Alopecia    • Anxiety    • Arthritis    • Asthmatic bronchitis    • Bladder spasms    • Chronic low back pain    • Colitis    • Depression    • Eczema    • Esophageal reflux    • Fibromyalgia    • Headache    • High risk medication use    • Migraine headache    • Neoplasm of uncertain behavior of breast    • Skin cancer    • Sleep apnea with use of continuous positive airway pressure (CPAP)    • Thyroid disorder    • Urinary frequency    • Urinary pain        Past Surgical History:   Procedure Laterality Date   • APPENDECTOMY  1980    DR. MATIAS   • CHOLECYSTECTOMY  1989    DR. IGOR FUNES   • CRANIOTOMY  1996   • TUBAL ABDOMINAL LIGATION  1982       Family History   Problem Relation Age of Onset   • Heart disease Other    • Diabetes Other    • Alcohol abuse Other    • Anxiety disorder Other    • Bipolar disorder Other    • Cancer Other    • Depression Other    • Lung disease Other    • Kidney disease Other    • Rheum arthritis Other    • Thyroid disease Other        Social History     Social History   • Marital status:      Spouse name: N/A   • Number of children: N/A   • Years of education: N/A     Occupational History   • Not on file.     Social History Main Topics   • Smoking status: Current Every Day Smoker   • Smokeless tobacco: Never Used   • Alcohol use Yes   • Drug use: Yes     Special: Marijuana    • Sexual activity: No     Other Topics Concern   • Not on file     Social History Narrative         Physical Exam   Constitutional: She is oriented to person, place, and time. She appears well-developed and well-nourished.   HENT:   Head: Normocephalic.   Mouth/Throat: Oropharynx is clear and moist.   Eyes: Conjunctivae are normal. Pupils are equal, round, and reactive to light.   Neck: No thyromegaly present.   Cardiovascular: Normal rate.    Pulmonary/Chest: Effort normal. No respiratory distress.   Abdominal: Soft. She exhibits no distension. There is no tenderness.   Musculoskeletal: She exhibits no edema or deformity.   Neurological: She is alert and oriented to person, place, and time. Coordination normal.   Skin: Skin is warm and dry.   Psychiatric: She has a normal mood and affect. Her behavior is normal. Judgment and thought content normal.   Nursing note and vitals reviewed.        Assessment/Plan    Iris was seen today for fatigue and nicotine dependence.    Diagnoses and all orders for this visit:    Acquired hypothyroidism  -     TSH+Free T4    Hypertension, unspecified type  -     Comprehensive Metabolic Panel  -     hydrochlorothiazide (MICROZIDE) 12.5 MG capsule; Take 1 capsule by mouth Every Morning.    High risk medication use  -     Comprehensive Metabolic Panel    Fibromyalgia    Obesity, morbid, BMI 40.0-49.9        Patient is here for routine follow-up especially after adjusting thyroid medication.  Blood pressure is noted to be somewhat elevated and patient reports some general puffiness.  Will start low-dose thiazide diuretic and repeat blood pressure in one month.  Will get routine lab as noted and follow-up thyroid level after recent adjustment of supplement.    This note includes information entered using a voice recognition dictation system.  Though reviewed, some nonsensible errors may remain.       shortness of breath, paroxysmal nocturnal dyspnea, orthopnea, lightheadedness, dizziness, pre-syncope, or syncope.  She does not drink caffeine or alcohol and tries to drink at least 60 ounces of fluids daily.      Cardiographics (EKG, Apple Watch ECG personally reviewed):  EKG done 3/22/2024 shows sinus rhythm at 67 bpm,  ms, QT/QTc 392/404 ms    Apple Watch ECGs: Recording from 10/17/2024 is of poor quality, but consistent with sinus rhythm.  1 episode labeled atrial fibrillation was in fact sinus with mildly elevated rate.  Multiple ECG strips reviewed, no significant arrhythmia noted.    Cardiac event monitoring from 5/31/2023 to 6/13/2023 (monitored duration 12d 13h 51m).  Baseline rhythm was sinus rhythm 70bpm.    Reported heart rate range 50 to 153bpm, average 66bpm.  32 symptom triggers correlated to sinus rhythm, sinus arrhythmia, 1 episode of nonsustained supraventricular tachycardia (6 beats, 153bpm), PACs (including couplets and triplets), PVCs.  No sustained tachyarrhythmias.  No atrial fibrillation.  There were no pauses noted.  Supraventricular and ventricular ectopic beat frequency are not reported on this monitoring modality.     Mobile cardiac telemetry monitoring from 6.28/2022 to 7/4/2022 (monitored duration 5d 20h 55m).  Predominant underlying rhythm was sinus rhythm.    Overall heart rate range 50 to 145bpm, average 80bpm.  Infrequent episodes (3 episodes reported) of nonsustained atrial tachycardia - longest 8 beats, fastest 145bpm.  No sustained tachyarrhythmias  No atrial fibrillation  There were no pauses noted.  Rare supraventricular ectopic beats (<1%).  Rare premature ventricular contractions (1%).  Symptom triggers (53, palpitations, lightheadedness) correlated to sinus rhythm 74-126bpm with and without isolated PACs, nonsustained atrial tachycardia.    ECHO done 6/3/2021:  1. Normal left ventricular chamber size and systolic function with   estimated ejection fraction 60-65%.  "  2. Normal right ventricular cavity size and global systolic function.   3. Normal cardiac valve structure and function.   4. The right ventricular systolic pressure is estimated to be normal at   16-21 mmHg.   5. There is no significant change from the previous exam performed on   12/23/2019.     I have reviewed and updated the patient's Past Medical History, Social History, Family History and Medication List.     Physical Examination  Review of Systems   Vitals: BP 90/58 (BP Location: Right arm, Patient Position: Sitting, Cuff Size: Adult Small)   Pulse 84   Resp 16   Ht 1.727 m (5' 8\")   Wt 61.7 kg (136 lb)   BMI 20.68 kg/m    BMI= Body mass index is 20.68 kg/m .  Wt Readings from Last 3 Encounters:   10/24/24 61.7 kg (136 lb)   10/26/23 63.5 kg (139 lb 14.4 oz)   10/10/23 63.5 kg (140 lb)       General Appearance:   Alert, well-appearing and in no acute distress.   HEENT: Atraumatic, normocephalic.  No scleral icterus, normal conjunctivae, EOMs intact, PERRL.  Wearing a mask.     Chest/Lungs:   Chest symmetric, spine straight.  Respirations unlabored.  Lungs are clear to auscultation.   Cardiovascular:   Regular rate and rhythm.  Normal first and second heart sounds with no murmurs, rubs, or gallops; radial pulses are intact, no edema.   Abdomen:  Soft, nondistended, bowel sounds present.   Extremities: No cyanosis or clubbing.   Musculoskeletal: Moves all extremities.     Skin: Warm, dry, intact.    Neurologic: Mood and affect are appropriate.  Alert and oriented to person, place, time, and situation.     ROS: 10 point ROS neg other than the symptoms noted above in the HPI.        Medical History  Surgical History Family History Social History   Past Medical History:   Diagnosis Date    Auditory processing disorder 7/26/2021    Autonomic dysfunction 7/26/2021    Bruxism 7/26/2021    Female stress incontinence 9/24/2020    NAPOLEON (generalized anxiety disorder)     Hypoglycemia 7/26/2021    Hypothyroid     " Hypothyroidism 2021    IBS (irritable bowel syndrome)     Insomnia 2021    Irritable bowel syndrome 2021    mild MICHAEL (obstructive sleep apnea)     Plantar fasciitis 2021    PVC's (premature ventricular contractions) 2021    Just noted on holter    PVD (posterior vitreous detachment), bilateral 2021    Rosacea 2021    Seasonal allergic rhinitis 2021    SVT (supraventricular tachycardia) (H)     Syncope 2021    Syringomyelia (H) 2021    Vasovagal reaction      Past Surgical History:   Procedure Laterality Date     SECTION      WISDOM TOOTH EXTRACTION       Family History   Problem Relation Age of Onset    Atrial fibrillation Father     Cerebrovascular Disease Father     Dementia Father     Ovarian Cancer Mother     Hypertension Mother     Sleep Apnea Brother     Myocardial Infarction Maternal Grandmother     Cancer Paternal Grandfather     Colon Cancer No family hx of     Breast Cancer No family hx of         Social History     Socioeconomic History    Marital status: Single     Spouse name: Not on file    Number of children: Not on file    Years of education: Not on file    Highest education level: Not on file   Occupational History    Occupation: homemaker    Occupation: student:    Tobacco Use    Smoking status: Never     Passive exposure: Never    Smokeless tobacco: Never   Substance and Sexual Activity    Alcohol use: Not Currently    Drug use: Not Currently    Sexual activity: Not Currently   Other Topics Concern    Parent/sibling w/ CABG, MI or angioplasty before 65F 55M? Not Asked   Social History Narrative    Not on file     Social Drivers of Health     Financial Resource Strain: Low Risk  (2023)    [facility]    Financial Resource Strain     Difficulty of Paying Living Expenses: 3     Difficulty of Paying Living Expenses: Not on file   Food  Insecurity: No Food Insecurity (11/1/2023)    Received from [facility]    Food Insecurity     Do you worry your food will run out before you are able to buy more?: 1   Transportation Needs: No Transportation Needs (11/1/2023)    [facility], [facility]    Transportation Needs     Lack of Transportation (Medical): 1   Physical Activity: Not on file   Stress: Not on file   Social Connections: Socially Isolated (11/1/2023)    [facility], [facility]    Social Connections     Frequency of Communication with Friends and Family: 4   Interpersonal Safety: Not on file   Housing Stability: Low Risk  (11/1/2023)    Received from [facility]    Housing Stability     What is your housing situation today?: 1           Medications  Allergies   Current Outpatient Medications   Medication Sig Dispense Refill    adapalene (DIFFERIN) 0.1 % external cream Apply pea-sized amount to face at bedtime. Start every other night for 1-2 weeks, then increase to nightly as tolerated. 45 g 11    ASHWAGANDHA PO       azelaic acid (FINACIA) 15 % external gel Use 1-2 times daily to face 50 g 11    azelastine (ASTELIN) 0.1 % nasal spray Spray 1 spray into both nostrils daily as needed      Cyanocobalamin (B-12) 1000 MCG CAPS B12      EC-RX Testosterone 0.2 % CREA as directed topically Once a day      ketotifen (ZADITOR) 0.025 % ophthalmic solution Apply 1 drop to eye as needed      l-tyrosine 500 MG TABS Take by mouth.      levothyroxine (SYNTHROID) 125 MCG tablet Take 1 tablet (125 mcg) by mouth daily 90 tablet 3    MAGNESIUM GLYCINATE PO Take 2 capsules by mouth daily      Omega-3 Fatty Acids (FISH OIL) 1200 MG capsule Take 1,200 mg by mouth daily      progesterone (PROMETRIUM) 100 MG capsule Take 100 mg by mouth daily  "Takes at dinner      traZODone (DESYREL) 50 MG tablet trazodone 50 mg tablet   TAKE 1/2 TO 1 TABLET BY MOUTH AT BEDTIME      UNABLE TO FIND Take 22.5 mg by mouth daily MEDICATION NAME: Zinc Bisglyconate      UNABLE TO FIND Apply topically 2 times daily MEDICATION NAME: Bi-estrogen (compound) BID      UNABLE TO FIND Take 1 tablet by mouth daily MEDICATION NAME: Zinc & Copper 15mg      vitamin D3 (CHOLECALCIFEROL) 50 mcg (2000 units) tablet Take 50 mcg by mouth daily 2000 units       Zinc 30 MG TABS 30 mg         Allergies   Allergen Reactions    Apple Other (See Comments)    Apple Juice Other (See Comments)     Skin on roof of mouth got sensitive and then she noted sometimes a piece of skin from the roof of the mouth fell off as a result of this.     Melon Other (See Comments)     Oral reaction     Mold     Morphine Nausea and Vomiting and Other (See Comments)    Nsaids Hives and Other (See Comments)    Other Environmental Allergy     Pear Other (See Comments)    Tomato Hives and Other (See Comments)          Lab Results    Chemistry/lipid CBC Cardiac Enzymes/BNP/TSH/INR   Recent Labs   Lab Test 10/13/23  1152 10/13/21  0957   CHOL  --  199   HDL  --  57   LDL  --  121   TRIG 159* 107     Recent Labs   Lab Test 10/13/21  0957        Recent Labs   Lab Test 03/22/24  1506      POTASSIUM 4.3   CHLORIDE 106   CO2 28   *   BUN 17   CR 1.00   GFRESTIMATED 67   ERICH 10.0     Recent Labs   Lab Test 03/22/24  1506 05/29/22  1134 10/13/21  0957   CR 1.00 0.65 0.78    Recent Labs   Lab Test 03/22/24  1506   WBC 7.0   HGB 13.9   HCT 42.6   MCV 96        Recent Labs   Lab Test 03/22/24  1506 10/19/22  1055 10/13/21  0957   HGB 13.9 13.5 14.1    No results for input(s): \"TROPONINI\" in the last 75742 hours.  No results for input(s): \"BNP\", \"NTBNPI\", \"NTBNP\" in the last 14389 hours.  Recent Labs   Lab Test 07/07/23  1109   TSH 0.85     No results for input(s): \"INR\" in the last 36005 hours.   54 minutes " were spent on the date of encounter performing chart review, history and exam, documentation, and further activities as noted above.

## 2024-10-24 NOTE — LETTER
10/24/2024    Dayana Calle MD  [address]  [facility] [address]    RE: Carmel Fischer       Dear Colleague,     I had the pleasure of [facility] the [facility].    HEART CARE ELECTROPHYSIOLOGY CONSULTATON NOTE      [facility]  [phone]    Thank you, Dr. Turner, for asking the [facility] Electrophysiology team to see Ms. Carmel Fischer to evaluate palpitations.    Assessment/Recommendations   Assessment/Plan:  1.  Palpitations: Previous MCT's have documented very brief runs of AT, longest 14 beats.  No documentation of sustained arrhythmia.  -- Zio patch monitor x 14 days  -- Consideration for ILR implant    2.  Vasovagal presyncope/autonomic dysfunction: Mild episodes, no denisse syncope.  She was given information to review at home.  -- Recommend aggressive daily hydration with a goal of 70 to 80 ounces daily of noncaffeinated, nonalcoholic beverage, more on hot days or with strenuous activity.    -- Liberalize sodium intake.      Follow up pending monitor results     History of Present Illness/Subjective    HPI: Carmel Fischer is a 53 year old female who comes in today for EP consultation of PSVT/palpitations.  She has a history of paroxysmal atrial tachycardia, PACs, rare PVCs, vasovagal presyncope, mild MICHAEL, anxiety, IBS.      Palpitations have been previously suppressed with low-dose propranolol, which she is also on for mood stabilization.  She is no longer taking propranolol as she prefers to use natural supplements, guided by a functional medicine practitioner.  She had more palpitations and hot flashes with L-theanine.  She is now trying I tyrosine.  In the past couple of months, she has noted more frequent episodes of palpitations.  On 3 or 4 occasions, she has felt like her heart stopped briefly associated with presyncope.  She has also had clusters of abnormal beats, skips, brief tachypalpitations.  She appears  to have more episodes when having abdominal pressure or gas, or with getting lightheadedness upon position change.  She denies chest discomfort, palpitations, abdominal fullness/bloating or peripheral edema, shortness of breath, paroxysmal nocturnal dyspnea, orthopnea, lightheadedness, dizziness, pre-syncope, or syncope.  She does not drink caffeine or alcohol and tries to drink at least 60 ounces of fluids daily.      Cardiographics (EKG, Apple Watch ECG personally reviewed):  EKG done 3/22/2024 shows sinus rhythm at 67 bpm,  ms, QT/QTc 392/404 ms    Apple Watch ECGs: Recording from 10/17/2024 is of poor quality, but consistent with sinus rhythm.  1 episode labeled atrial fibrillation was in fact sinus with mildly elevated rate.  Multiple ECG strips reviewed, no significant arrhythmia noted.    Cardiac event monitoring from 5/31/2023 to 6/13/2023 (monitored duration 12d 13h 51m).  Baseline rhythm was sinus rhythm 70bpm.    Reported heart rate range 50 to 153bpm, average 66bpm.  32 symptom triggers correlated to sinus rhythm, sinus arrhythmia, 1 episode of nonsustained supraventricular tachycardia (6 beats, 153bpm), PACs (including couplets and triplets), PVCs.  No sustained tachyarrhythmias.  No atrial fibrillation.  There were no pauses noted.  Supraventricular and ventricular ectopic beat frequency are not reported on this monitoring modality.     Mobile cardiac telemetry monitoring from 6.28/2022 to 7/4/2022 (monitored duration 5d 20h 55m).  Predominant underlying rhythm was sinus rhythm.    Overall heart rate range 50 to 145bpm, average 80bpm.  Infrequent episodes (3 episodes reported) of nonsustained atrial tachycardia - longest 8 beats, fastest 145bpm.  No sustained tachyarrhythmias  No atrial fibrillation  There were no pauses noted.  Rare supraventricular ectopic beats (<1%).  Rare premature ventricular contractions (1%).  Symptom triggers (53, palpitations, lightheadedness) correlated to sinus  "rhythm 74-126bpm with and without isolated PACs, nonsustained atrial tachycardia.    ECHO done 6/3/2021:  1. Normal left ventricular chamber size and systolic function with   estimated ejection fraction 60-65%.   2. Normal right ventricular cavity size and global systolic function.   3. Normal cardiac valve structure and function.   4. The right ventricular systolic pressure is estimated to be normal at   16-21 mmHg.   5. There is no significant change from the previous exam performed on   12/23/2019.     I have reviewed and updated the patient's Past Medical History, Social History, Family History and Medication List.     Physical Examination  Review of Systems   Vitals: BP 90/58 (BP Location: Right arm, Patient Position: Sitting, Cuff Size: Adult Small)   Pulse 84   Resp 16   Ht 1.727 m (5' 8\")   Wt 61.7 kg (136 lb)   BMI 20.68 kg/m    BMI= Body mass index is 20.68 kg/m .  Wt Readings from Last 3 Encounters:   10/24/24 61.7 kg (136 lb)   10/26/23 63.5 kg (139 lb 14.4 oz)   10/10/23 63.5 kg (140 lb)       General Appearance:   Alert, well-appearing and in no acute distress.   HEENT: Atraumatic, normocephalic.  No scleral icterus, normal conjunctivae, EOMs intact, PERRL.  Wearing a mask.     Chest/Lungs:   Chest symmetric, spine straight.  Respirations unlabored.  Lungs are clear to auscultation.   Cardiovascular:   Regular rate and rhythm.  Normal first and second heart sounds with no murmurs, rubs, or gallops; radial pulses are intact, no edema.   Abdomen:  Soft, nondistended, bowel sounds present.   Extremities: No cyanosis or clubbing.   Musculoskeletal: Moves all extremities.     Skin: Warm, dry, intact.    Neurologic: Mood and affect are appropriate.  Alert and oriented to person, place, time, and situation.     ROS: 10 point ROS neg other than the symptoms noted above in the HPI.        Medical History  Surgical History Family History Social History   Past Medical History:   Diagnosis Date     Auditory " processing disorder 2021     Autonomic dysfunction 2021     Bruxism 2021     Female stress incontinence 2020     NAPOLEON (generalized anxiety disorder)      Hypoglycemia 2021     Hypothyroid      Hypothyroidism 2021     IBS (irritable bowel syndrome)      Insomnia 2021     Irritable bowel syndrome 2021     mild MICHAEL (obstructive sleep apnea)      Plantar fasciitis 2021     PVC's (premature ventricular contractions) 2021    Just noted on holter     PVD (posterior vitreous detachment), bilateral 2021     Rosacea 2021     Seasonal allergic rhinitis 2021     SVT (supraventricular tachycardia) (H)      Syncope 2021     Syringomyelia (H) 2021     Vasovagal reaction      Past Surgical History:   Procedure Laterality Date      SECTION       WISDOM TOOTH EXTRACTION       Family History   Problem Relation Age of Onset     Atrial fibrillation Father      Cerebrovascular Disease Father      Dementia Father      Ovarian Cancer Mother      Hypertension Mother      Sleep Apnea Brother      Myocardial Infarction Maternal Grandmother      Cancer Paternal Grandfather      Colon Cancer No family hx of      Breast Cancer No family hx of         Social History     Socioeconomic History     Marital status: Single     Spouse name: Not on file     Number of children: Not on file     Years of education: Not on file     Highest education level: Not on file   Occupational History     Occupation: homemaker     Occupation: student:    Tobacco Use     Smoking status: Never     Passive exposure: Never     Smokeless tobacco: Never   Substance and Sexual Activity     Alcohol use: Not Currently     Drug use: Not Currently     Sexual activity: Not Currently   Other Topics Concern     Parent/sibling w/ CABG, MI or angioplasty before 65F 55M? Not Asked   Social History Narrative     Not on file     Social Drivers of Health     Financial Resource Strain: Low Risk   (11/1/2023)    Received from [facility], [facility]    Financial Resource Strain      Difficulty of Paying Living Expenses: 3      Difficulty of Paying Living Expenses: Not on file   Food Insecurity: No Food Insecurity (11/1/2023)    Received from [facility]    Food Insecurity      Do you worry your food will run out before you are able to buy more?: 1   Transportation Needs: No Transportation Needs (11/1/2023)    Received from [facility], [facility]    Transportation Needs      Lack of Transportation (Medical): 1   Physical Activity: Not on file   Stress: Not on file   Social Connections: Socially Isolated (11/1/2023)    Received from [facility], [facility]    Social Connections      Frequency of Communication with Friends and Family: 4   Interpersonal Safety: Not on file   Housing Stability: Low Risk  (11/1/2023)    Received from [facility]    Housing Stability      What is your housing situation today?: 1           Medications  Allergies   Current Outpatient Medications   Medication Sig Dispense Refill     adapalene (DIFFERIN) 0.1 % external cream Apply pea-sized amount to face at bedtime. Start every other night for 1-2 weeks, then increase to nightly as tolerated. 45 g 11     ASHWAGANDHA PO        azelaic acid (FINACIA) 15 % external gel Use 1-2 times daily to face 50 g 11     azelastine (ASTELIN) 0.1 % nasal spray Spray 1 spray into both nostrils daily as needed       Cyanocobalamin (B-12) 1000 MCG CAPS B12       EC-RX Testosterone 0.2 % CREA as directed topically Once a day       ketotifen (ZADITOR) 0.025 % ophthalmic solution Apply 1 drop to eye as needed       l-tyrosine 500 MG TABS Take by mouth.       levothyroxine (SYNTHROID) 125 MCG  tablet Take 1 tablet (125 mcg) by mouth daily 90 tablet 3     MAGNESIUM GLYCINATE PO Take 2 capsules by mouth daily       Omega-3 Fatty Acids (FISH OIL) 1200 MG capsule Take 1,200 mg by mouth daily       progesterone (PROMETRIUM) 100 MG capsule Take 100 mg by mouth daily Takes at dinner       traZODone (DESYREL) 50 MG tablet trazodone 50 mg tablet   TAKE 1/2 TO 1 TABLET BY MOUTH AT BEDTIME       UNABLE TO FIND Take 22.5 mg by mouth daily MEDICATION NAME: Zinc Bisglyconate       UNABLE TO FIND Apply topically 2 times daily MEDICATION NAME: Bi-estrogen (compound) BID       UNABLE TO FIND Take 1 tablet by mouth daily MEDICATION NAME: Zinc & Copper 15mg       vitamin D3 (CHOLECALCIFEROL) 50 mcg (2000 units) tablet Take 50 mcg by mouth daily 2000 units        Zinc 30 MG TABS 30 mg         Allergies   Allergen Reactions     Apple Other (See Comments)     Apple Juice Other (See Comments)     Skin on roof of mouth got sensitive and then she noted sometimes a piece of skin from the roof of the mouth fell off as a result of this.      Melon Other (See Comments)     Oral reaction      Mold      Morphine Nausea and Vomiting and Other (See Comments)     Nsaids Hives and Other (See Comments)     Other Environmental Allergy      Pear Other (See Comments)     Tomato Hives and Other (See Comments)          Lab Results    Chemistry/lipid CBC Cardiac Enzymes/BNP/TSH/INR   Recent Labs   Lab Test 10/13/23  1152 10/13/21  0957   CHOL  --  199   HDL  --  57   LDL  --  121   TRIG 159* 107     Recent Labs   Lab Test 10/13/21  0957        Recent Labs   Lab Test 03/22/24  1506      POTASSIUM 4.3   CHLORIDE 106   CO2 28   *   BUN 17   CR 1.00   GFRESTIMATED 67   ERICH 10.0     Recent Labs   Lab Test 03/22/24  1506 05/29/22  1134 10/13/21  0957   CR 1.00 0.65 0.78    Recent Labs   Lab Test 03/22/24  1506   WBC 7.0   HGB 13.9   HCT 42.6   MCV 96        Recent Labs   Lab Test 03/22/24  1506 10/19/22  1055 10/13/21  0957  "  HGB 13.9 13.5 14.1    No results for input(s): \"TROPONINI\" in the last 30246 hours.  No results for input(s): \"BNP\", \"NTBNPI\", \"NTBNP\" in the last 16192 hours.  Recent Labs   Lab Test 07/07/23  1109   TSH 0.85     No results for input(s): \"INR\" in the last 04978 hours.   54 minutes were spent on the date of encounter performing chart review, history and exam, documentation, and further activities as noted above.                                           Thank you for allowing me to participate in the care of your patient.      Sincerely,     [facility]  cc:   Harper Turner MD  [address], [address]  [place] [address]      "

## 2024-10-25 ENCOUNTER — OFFICE VISIT (OUTPATIENT)
Dept: DERMATOLOGY | Facility: CLINIC | Age: 54
End: 2024-10-25
Payer: COMMERCIAL

## 2024-10-25 DIAGNOSIS — Z12.83 SKIN CANCER SCREENING: Primary | ICD-10-CM

## 2024-10-25 DIAGNOSIS — L70.0 ACNE VULGARIS: ICD-10-CM

## 2024-10-25 DIAGNOSIS — D22.9 MULTIPLE BENIGN NEVI: ICD-10-CM

## 2024-10-25 DIAGNOSIS — L81.4 SOLAR LENTIGO: ICD-10-CM

## 2024-10-25 DIAGNOSIS — B07.0 VERRUCA PLANTARIS: ICD-10-CM

## 2024-10-25 DIAGNOSIS — L71.9 ROSACEA: ICD-10-CM

## 2024-10-25 DIAGNOSIS — D18.01 CHERRY ANGIOMA: ICD-10-CM

## 2024-10-25 PROCEDURE — 17110 DESTRUCTION B9 LES UP TO 14: CPT | Performed by: DERMATOLOGY

## 2024-10-25 PROCEDURE — 99213 OFFICE O/P EST LOW 20 MIN: CPT | Mod: 25 | Performed by: DERMATOLOGY

## 2024-10-25 RX ORDER — AZELAIC ACID 0.15 G/G
GEL TOPICAL
Qty: 50 G | Refills: 11 | Status: SHIPPED | OUTPATIENT
Start: 2024-10-25

## 2024-10-25 ASSESSMENT — PAIN SCALES - GENERAL: PAINLEVEL_OUTOF10: NO PAIN (0)

## 2024-10-25 ASSESSMENT — PATIENT HEALTH QUESTIONNAIRE - PHQ9: SUM OF ALL RESPONSES TO PHQ QUESTIONS 1-9: 3

## 2024-10-25 NOTE — LETTER
10/25/2024       RE: Carmel Fischer  [address]  [place] [address]     Dear Colleague,    Thank you for referring your patient, Carmel Fischer, to the [facility] [place] [facility]. Please see a copy of my visit note below.    [facility] Dermatology Note  Encounter Date: Oct 25, 2024  Office Visit     Dermatology Problem List:  Last skin check 10/25/24    # Telogen effluvium  # Rosacea  - Current tx: azelaic acid 15%  # Acne.  - BP 5% wash, adapalene 0.1% cream  # Notalgia paresthetica  - Stretching exercises  # Verruca plantaris, right 1st toe  - s/p cryotherapy 10/20/23, 10/25/2024    # LTM, right supra nasal tip  - notify for any changes    ____________________________________________    Assessment & Plan:    # LTM, right supra nasal tip - appears reassuring and unchanged from prior  - notify for any changes    # Rosacea, chronic, well controlled on azelaic acid.   - Continue azelaic acid 15% gel. Refill provided today.  - Future considerations: topical abx     # Acne vulgaris, chronic, stable.   - can continue BP 4% wash  - can continue adapalene 0.1% cream.  - Future considerations: tretinoin 0.025% cream    # Verruca plantaris, right 1st toe . Ddx includes corn, discussed difficulty in differentiation but reassuringly treatment is similar.   - Cryotherapy performed today (see procedure note(s) below).  - Recommended salicylic acid 40%     # Multiple benign nevi.   - Monitor for ABCDEs of melanoma   - Continue sun protection - recommend SPF 30 or higher with frequent application   - Return sooner if noticing changing or symptomatic lesions     # Benign lesions - cherry angiomas, lentigenes.  - No treatment required     Procedures Performed:   Cryotherapy procedure note: After verbal consent and discussion of risks and benefits including but no limited to dyspigmentation/scar, blister, and pain,  1 wart was(were) treated with 1-2mm freeze border for 2 cycles with liquid nitrogen. Post cryotherapy instructions were provided.      Follow-up: 1 year(s) in-person, or earlier for new or changing lesions    Staff and Scribe:     Scribe Disclosure:   I, Galilea Matthew, am serving as a scribe; to document services personally performed by Reyna Gomes MD -based on data collection and the provider's statements to me.     Provider Disclosure:   The documentation recorded by the scribe accurately reflects the services I personally performed and the decisions made by me.    Reyna Gomes MD    Department of Dermatology  [facility]: Phone: [phone], Fax: [phone]  10/28/2024         ____________________________________________    CC: Derm Problem (FBSE - no areas of concern to pt)    HPI:  Ms. Carmel Fischer is a(n) 53 year old female who presents today as a return patient for above.    The patient reports that she has been doing well on her treatment for rosacea. The azelaic acid has overall been keeping her flare free. She has previously had a wart on her foot that she had frozen off at her last visit, she is interested in getting this spot frozen off again. She notes her PCP thought this area was a corn, rather than a wart. She states that she has noticed white spots on this area.     Patient is otherwise feeling well, without additional skin concerns.    Labs Reviewed:  N/A    Physical exam:  Vitals: There were no vitals taken for this visit.  GEN: This is a well developed, well-nourished female in no acute distress, in a pleasant mood.    SKIN: Total skin excluding the undergarment areas was performed. The exam included the head/face, neck, both arms, chest, back, abdomen, both legs, digits and/or nails.   - R 1st dorsal toe, bland flesh colored hyperkeratotic papule   -  2 mm bland hyperpigmented papule  without vessels on right nasal supra tip.   - There are dome shaped bright red papules on the trunk and extremities .   - Multiple regular brown pigmented macules and papules are identified on the trunk and extremities.  - Scattered brown macules on sun exposed areas.  - No other lesions of concern on areas examined.       Medications:  Current Outpatient Medications   Medication Sig Dispense Refill     adapalene (DIFFERIN) 0.1 % external cream Apply pea-sized amount to face at bedtime. Start every other night for 1-2 weeks, then increase to nightly as tolerated. 45 g 11     ASHWAGANDHA PO        azelaic acid (FINACIA) 15 % external gel Use 1-2 times daily to face 50 g 11     azelastine (ASTELIN) 0.1 % nasal spray Spray 1 spray into both nostrils daily as needed       Cyanocobalamin (B-12) 1000 MCG CAPS B12       EC-RX Testosterone 0.2 % CREA as directed topically Once a day       ketotifen (ZADITOR) 0.025 % ophthalmic solution Apply 1 drop to eye as needed       l-tyrosine 500 MG TABS Take by mouth.       levothyroxine (SYNTHROID) 125 MCG tablet Take 1 tablet (125 mcg) by mouth daily 90 tablet 3     MAGNESIUM GLYCINATE PO Take 2 capsules by mouth daily       Omega-3 Fatty Acids (FISH OIL) 1200 MG capsule Take 1,200 mg by mouth daily       progesterone (PROMETRIUM) 100 MG capsule Take 100 mg by mouth daily Takes at dinner       traZODone (DESYREL) 50 MG tablet trazodone 50 mg tablet   TAKE 1/2 TO 1 TABLET BY MOUTH AT BEDTIME       UNABLE TO FIND Take 22.5 mg by mouth daily MEDICATION NAME: Zinc Bisglyconate       UNABLE TO FIND Apply topically 2 times daily MEDICATION NAME: Bi-estrogen (compound) BID       UNABLE TO FIND Take 1 tablet by mouth daily MEDICATION NAME: Zinc & Copper 15mg       vitamin D3 (CHOLECALCIFEROL) 50 mcg (2000 units) tablet Take 50 mcg by mouth daily 2000 units        Zinc 30 MG TABS 30 mg       No current facility-administered medications for this visit.      Past Medical History:   Patient  Active Problem List   Diagnosis     Female stress incontinence     NAPOLEON (generalized anxiety disorder)     Insomnia     Autonomic dysfunction     Paroxysmal supraventricular tachycardia (H)     Syncope     Hypothyroidism     Irritable bowel syndrome     Syringomyelia (H)     Hypoglycemia     Rosacea     Seasonal allergic rhinitis     Bruxism     PVC's (premature ventricular contractions)     Plantar fasciitis     PVD (posterior vitreous detachment), bilateral     Auditory processing disorder     Palpitations     Past Medical History:   Diagnosis Date     Auditory processing disorder 7/26/2021     Autonomic dysfunction 7/26/2021     Bruxism 7/26/2021     Female stress incontinence 9/24/2020     NAPOLEON (generalized anxiety disorder)      Hypoglycemia 7/26/2021     Hypothyroid      Hypothyroidism 7/26/2021     IBS (irritable bowel syndrome)      Insomnia 7/26/2021     Irritable bowel syndrome 7/26/2021     mild MICHAEL (obstructive sleep apnea)      Plantar fasciitis 7/26/2021     PVC's (premature ventricular contractions) 7/26/2021    Just noted on holter     PVD (posterior vitreous detachment), bilateral 7/26/2021     Rosacea 7/26/2021     Seasonal allergic rhinitis 7/26/2021     SVT (supraventricular tachycardia) (H)      Syncope 7/26/2021     Syringomyelia (H) 7/26/2021     Vasovagal reaction         CC No referring provider defined for this encounter. on close of this encounter.      Again, thank you for allowing me to participate in the care of your patient.      Sincerely,    Reyna Gomes MD

## 2024-10-25 NOTE — PATIENT INSTRUCTIONS
Checking for Skin Cancer  You can help find cancer early by checking your skin each month. There are 3 main kinds of skin cancer: melanoma, basal cell carcinoma, and squamous cell carcinoma. Doing monthly skin checks is the best way to find new marks, sores, or skin changes. Follow these instructions for checking your skin.   The ABCDEs of checking moles for melanoma   Check your moles or growths for signs of melanoma using ABCDE:   Asymmetry: The sides of the mole or growth don t match.  Border: The edges are ragged, notched, or blurred.  Color: The color within the mole or growth varies. It could be black, brown, tan, white, or shades of red, gray, or blue.  Diameter: The mole or growth is larger than   inch or 6 mm (size of a pencil eraser).  Evolving: The size, shape, texture, or color of the mole or growth is changing.     ABCDE's of moles on light skin.        ABCDE's of moles on dark skin may be harder to identify.     Checking for other types of skin cancer  Basal cell carcinoma or squamous cell carcinoma cause symptoms like:     A spot or mole that looks different from all other marks on your skin  Changes in how an area feels, such as itching, tenderness, or pain  Changes in the skin's surface, such as oozing, bleeding, or scaliness  A sore that doesn't heal  New swelling, redness, or spread of color beyond the border of a mole    Who s at risk?  Anyone of any skin color can get skin cancer. But you're at greater risk if you have:   Fair skin that freckles easily and burns instead of tanning  Light-colored or red hair  Light-colored eyes  Many moles or abnormal moles on your skin  A long history of unprotected exposure to sunlight or tanning beds  A history of many blistering sunburns as a child or teen  A family history of skin cancer  Been exposed to radiation or chemicals  A weakened immune system  Been exposed to arsenic  If you've had skin cancer in the past, you're at high risk of having it again.    How to check your skin  Do your monthly skin checkups in front of a full-length mirror. Use a room with good lighting so it's easier to see. Use a hand mirror to look at hard-to-see places like your buttocks and back. You can also have a trusted friend or family member help you with these checks. Check every part of your body, including your:   Head (ears, face, neck, and scalp)  Torso (front, back, sides, and under breasts)  Arms (tops, undersides, and armpits)  Hands (palms, backs, and fingers, including under the nails)  Lower back, buttocks, and genitals  Legs (front, back, and sides)  Feet (tops, soles, toes, including under the nails, and between toes)  Watch for new spots on your skin or a spot that's changing in color, shape, size.   If you have a lot of moles, take digital photos of them each month. Make sure to take photos both up close and from a distance. These can help you see if any moles change over time.   Know your skin  Most skin changes aren't cancer. But if you see any changes in your skin, call your healthcare provider right away. Only they can tell you if a change is a problem. If you have skin cancer, seeing your provider can be the first step to getting the treatment that could save your life.   Isaiah last reviewed this educational content on 10/1/2021    0844-2469 [employer]. All rights reserved. This information is not intended as a substitute for professional medical care. Always follow your healthcare professional's instructions.     Cryotherapy    What is it?  Use of a very cold liquid, such as liquid nitrogen, to freeze and destroy abnormal skin cells that need to be removed    What should I expect?  Tenderness and redness  A small blister that might grow and fill with dark purple blood. There may be crusting.  More than one treatment may be needed if the lesions do not go away.    How do I care for the treated area?  Gently wash the area with your hands when  bathing.  Use a thin layer of Vaseline to help with healing. You may use a Band-Aid.   The area should heal within 7-10 days and may leave behind a pink or lighter color.   Do not use an antibiotic or Neosporin ointment.   You may take acetaminophen (Tylenol) for pain.     Call your doctor if you have:  Severe pain  Signs of infection (warmth, redness, cloudy yellow drainage, and or a bad smell)  Questions or concerns    Who should I call with questions?      [facility]: [phone]      [facility]: [phone]      For urgent needs outside of business hours call the [facility] at [phone] and ask for the dermatology resident on call

## 2024-10-25 NOTE — NURSING NOTE
Dermatology Rooming Note    Carmel Fischer's goals for this visit include:   Chief Complaint   Patient presents with    Derm Problem     FBSE - no areas of concern to pt     Madai Palacios, EMT

## 2024-11-04 ASSESSMENT — PATIENT HEALTH QUESTIONNAIRE - PHQ9: SUM OF ALL RESPONSES TO PHQ QUESTIONS 1-9: 9

## 2024-11-14 LAB — CV ZIO PRELIM RESULTS: NORMAL

## 2024-11-15 DIAGNOSIS — I47.10 PAROXYSMAL SUPRAVENTRICULAR TACHYCARDIA (H): Primary | ICD-10-CM

## 2024-12-19 ENCOUNTER — OFFICE VISIT (OUTPATIENT)
Dept: CARDIOLOGY | Facility: CLINIC | Age: 54
End: 2024-12-19
Attending: NURSE PRACTITIONER
Payer: COMMERCIAL

## 2024-12-19 ENCOUNTER — PREP FOR PROCEDURE (OUTPATIENT)
Dept: CARDIOLOGY | Facility: CLINIC | Age: 54
End: 2024-12-19

## 2024-12-19 ENCOUNTER — DOCUMENTATION ONLY (OUTPATIENT)
Dept: CARDIOLOGY | Facility: CLINIC | Age: 54
End: 2024-12-19

## 2024-12-19 VITALS
DIASTOLIC BLOOD PRESSURE: 58 MMHG | WEIGHT: 137 LBS | HEART RATE: 75 BPM | BODY MASS INDEX: 20.83 KG/M2 | SYSTOLIC BLOOD PRESSURE: 94 MMHG | RESPIRATION RATE: 16 BRPM

## 2024-12-19 DIAGNOSIS — R55 SYNCOPE, UNSPECIFIED SYNCOPE TYPE: Primary | ICD-10-CM

## 2024-12-19 DIAGNOSIS — I47.10 PAROXYSMAL SUPRAVENTRICULAR TACHYCARDIA (H): ICD-10-CM

## 2024-12-19 DIAGNOSIS — R00.2 PALPITATIONS: ICD-10-CM

## 2024-12-19 RX ORDER — LIDOCAINE 40 MG/G
CREAM TOPICAL
OUTPATIENT
Start: 2024-12-19

## 2024-12-19 NOTE — LETTER
12/19/2024    Dayana Calle MD  [address]  [facility] [address]    RE: Carmel Fischer       Dear Colleague,     I had the pleasure of seeing Carmel Fischer in the [facility].    HEART CARE ELECTROPHYSIOLOGY NOTE      [facility]  [phone]      Assessment/Recommendations   Assessment/Plan:  1.  Palpitations: Increasing palpitations.  PACs, PVCs, PSVT documented on MCT's.  No documentation of sustained arrhythmia.  -- Recommend ILR implant    2.  Vasovagal presyncope/autonomic dysfunction: Recurrent episodes of presyncope, no denisse syncope.  Sudden onset presyncope, occurring while sitting, cannot rule out arrhythmia.  -- Aggressive daily hydration with a goal of 70 to 80 ounces daily of noncaffeinated, nonalcoholic beverage, more on hot days or with strenuous activity.    -- Liberalize sodium intake   -- Thigh-high compression stockings  -- Recommend ILR implant       History of Present Illness/Subjective    HPI: Carmel Fischer is a 54 year old female who comes in today for EP follow-up of presyncope, PSVT/palpitations.  She has a history of paroxysmal atrial tachycardia, PACs, rare PVCs, vasovagal presyncope, mild MICHAEL, anxiety, IBS.      Palpitations have been previously suppressed with low-dose propranolol, which she is also on for mood stabilization.  She is no longer taking propranolol as she prefers to use natural supplements, guided by a functional medicine practitioner.  She had more palpitations and hot flashes with L-theanine.  She also tried I tyrosine, but with morning adrenaline surge.  Frequency and duration of palpitations have been increasing.  On 3 or 4 occasions, she has felt like her heart stopped briefly associated with presyncope.  She has also had clusters of abnormal beats, skips, brief tachypalpitations.  She appears to have more episodes when having abdominal pressure or gas, or with getting lightheadedness upon position change.   More frequently, she is having episodes of sudden onset presyncope without any prodrome, occurring while seated.  She denies chest discomfort, palpitations, abdominal fullness/bloating or peripheral edema, shortness of breath, paroxysmal nocturnal dyspnea, orthopnea, lightheadedness, dizziness, pre-syncope, or syncope.  She is planning to move to [place] in February.    Cardiographics (EKG, Apple Watch ECG personally reviewed):  EKG done 3/22/2024 shows sinus rhythm at 67 bpm,  ms, QT/QTc 392/404 ms    Apple Watch ECGs: Recording from 10/17/2024 is of poor quality, but consistent with sinus rhythm.  1 episode labeled atrial fibrillation was in fact sinus with mildly elevated rate.  Multiple ECG strips reviewed, no significant arrhythmia noted.    Zio monitoring from 10/28/2024 to 11/11/2024 (duration 14d 0h).  Predominant rhythm was sinus rhythm, 44 to 145bpm, average 79bpm.  30 episode(s) of nonsustained supraventricular tachycardia - longest 23.0s at 124bpm, fastest 19 beats at 226bpm.  No sustained tachyarrhythmias.  No atrial fibrillation.  There were no pauses of greater than 3 seconds.  Rare supraventricular ectopic beats (isolated <1%).  Rare premature ventricular contractions (isolated <1%).  Symptom triggers and diary entries (>108) correlated to sinus rhythm 68-118bpm with and without PACs, nonsustained SVT, PVCs.    Cardiac event monitoring from 5/31/2023 to 6/13/2023 (monitored duration 12d 13h 51m).  Baseline rhythm was sinus rhythm 70bpm.    Reported heart rate range 50 to 153bpm, average 66bpm.  32 symptom triggers correlated to sinus rhythm, sinus arrhythmia, 1 episode of nonsustained supraventricular tachycardia (6 beats, 153bpm), PACs (including couplets and triplets), PVCs.  No sustained tachyarrhythmias.  No atrial fibrillation.  There were no pauses noted.  Supraventricular and ventricular ectopic beat frequency are not reported on this monitoring modality.     Mobile cardiac telemetry  monitoring from 6.28/2022 to 7/4/2022 (monitored duration 5d 20h 55m).  Predominant underlying rhythm was sinus rhythm.    Overall heart rate range 50 to 145bpm, average 80bpm.  Infrequent episodes (3 episodes reported) of nonsustained atrial tachycardia - longest 8 beats, fastest 145bpm.  No sustained tachyarrhythmias  No atrial fibrillation  There were no pauses noted.  Rare supraventricular ectopic beats (<1%).  Rare premature ventricular contractions (1%).  Symptom triggers (53, palpitations, lightheadedness) correlated to sinus rhythm 74-126bpm with and without isolated PACs, nonsustained atrial tachycardia.    ECHO done 6/3/2021:  1. Normal left ventricular chamber size and systolic function with   estimated ejection fraction 60-65%.   2. Normal right ventricular cavity size and global systolic function.   3. Normal cardiac valve structure and function.   4. The right ventricular systolic pressure is estimated to be normal at   16-21 mmHg.   5. There is no significant change from the previous exam performed on   12/23/2019.     I have reviewed and updated the patient's Past Medical History, Social History, Family History and Medication List.     Physical Examination  Review of Systems   Vitals: BP 94/58 (BP Location: Left arm, Patient Position: Sitting, Cuff Size: Adult Regular)   Pulse 75   Resp 16   Wt 62.1 kg (137 lb)   BMI 20.83 kg/m    BMI= Body mass index is 20.83 kg/m .  Wt Readings from Last 3 Encounters:   12/19/24 62.1 kg (137 lb)   10/24/24 61.7 kg (136 lb)   10/26/23 63.5 kg (139 lb 14.4 oz)       General Appearance:   Alert, well-appearing and in no acute distress.   HEENT: Atraumatic, normocephalic.  No scleral icterus, normal conjunctivae, EOMs intact, PERRL.  Mucous membranes pink and moist.     Chest/Lungs:   Chest symmetric, spine straight.  Respirations unlabored.  Lungs are clear to auscultation.   Cardiovascular:   Regular rate and rhythm.  Normal first and second heart sounds with no  murmurs, rubs, or gallops; radial pulses are intact, no edema.   Abdomen:  Soft, nondistended.   Extremities: No cyanosis or clubbing.   Musculoskeletal: Moves all extremities.     Skin: Warm, dry, intact.    Neurologic: Mood and affect are appropriate.  Alert and oriented to person, place, time, and situation.     ROS: 10 point ROS neg other than the symptoms noted above in the HPI.        Medical History  Surgical History Family History Social History   Past Medical History:   Diagnosis Date     Auditory processing disorder 2021     Autonomic dysfunction 2021     Bruxism 2021     Female stress incontinence 2020     NAPOLEON (generalized anxiety disorder)      Hypoglycemia 2021     Hypothyroid      Hypothyroidism 2021     IBS (irritable bowel syndrome)      Insomnia 2021     Irritable bowel syndrome 2021     mild MICHAEL (obstructive sleep apnea)      Plantar fasciitis 2021     PVC's (premature ventricular contractions) 2021    Just noted on holter     PVD (posterior vitreous detachment), bilateral 2021     Rosacea 2021     Seasonal allergic rhinitis 2021     SVT (supraventricular tachycardia) (H)      Syncope 2021     Syringomyelia (H) 2021     Vasovagal reaction      Past Surgical History:   Procedure Laterality Date      SECTION       WISDOM TOOTH EXTRACTION       Family History   Problem Relation Age of Onset     Ovarian Cancer Mother      Hypertension Mother      Atrial fibrillation Father      Cerebrovascular Disease Father      Dementia Father      Myocardial Infarction Maternal Grandmother      Cancer Paternal Grandfather      Sleep Apnea Brother      Colon Cancer No family hx of      Breast Cancer No family hx of      Skin Cancer No family hx of         Social History     Socioeconomic History     Marital status: Single     Spouse name: Not on file     Number of children: Not on file     Years of education: Not on file     Highest  education level: Not on file   Occupational History     Occupation: homemaker     Occupation: student:    Tobacco Use     Smoking status: Never     Passive exposure: Never     Smokeless tobacco: Never   Substance and Sexual Activity     Alcohol use: Not Currently     Drug use: Not Currently     Sexual activity: Not Currently   Other Topics Concern     Parent/sibling w/ CABG, MI or angioplasty before 65F 55M? Not Asked   Social History Narrative     Not on file     Social Drivers of Health     Financial Resource Strain: Low Risk  (11/1/2023)    [facility]    Financial Resource Strain      Difficulty of Paying Living Expenses: 3      Difficulty of Paying Living Expenses: Not on file   Food Insecurity: No Food Insecurity (11/1/2023)    Received from [facility]    Food Insecurity      Do you worry your food will run out before you are able to buy more?: 1   Transportation Needs: No Transportation Needs (11/1/2023)    Received from [facility]    Transportation Needs      Does lack of transportation keep you from medical appointments?: 1      Does lack of transportation keep you from work, meetings or getting things that you need?: 1   Physical Activity: Not on file   Stress: Not on file   Social Connections: Socially Isolated (11/1/2023)    Received from [facility]    Social Connections      Do you often feel lonely or isolated from those around you?: 4   Interpersonal Safety: Not on file   Housing Stability: Low Risk  (11/1/2023)    Received from [facility]    Housing Stability      What is your housing situation today?: 1           Medications  Allergies   Current Outpatient Medications   Medication Sig Dispense Refill     adapalene (DIFFERIN) 0.1 % external cream Apply pea-sized  amount to face at bedtime. Start every other night for 1-2 weeks, then increase to nightly as tolerated. 45 g 11     ASHWAGANDHA PO        azelaic acid (FINACIA) 15 % external gel Use 1-2 times daily to face 50 g 11     azelastine (ASTELIN) 0.1 % nasal spray Spray 1 spray into both nostrils daily as needed       Cyanocobalamin (B-12) 1000 MCG CAPS B12       EC-RX Testosterone 0.2 % CREA as directed topically Once a day       ketotifen (ZADITOR) 0.025 % ophthalmic solution Apply 1 drop to eye as needed       levothyroxine (SYNTHROID) 125 MCG tablet Take 1 tablet (125 mcg) by mouth daily 90 tablet 3     MAGNESIUM GLYCINATE PO Take 2 capsules by mouth daily       Omega-3 Fatty Acids (FISH OIL) 1200 MG capsule Take 1,200 mg by mouth daily       progesterone (PROMETRIUM) 100 MG capsule Take 150 mg by mouth daily. Takes at dinner       traZODone (DESYREL) 50 MG tablet trazodone 50 mg tablet   TAKE 1/2 TO 1 TABLET BY MOUTH AT BEDTIME       UNABLE TO FIND Take 22.5 mg by mouth daily MEDICATION NAME: Zinc Bisglyconate       UNABLE TO FIND Apply topically 2 times daily MEDICATION NAME: Bi-estrogen (compound) BID       UNABLE TO FIND Take 1 tablet by mouth daily MEDICATION NAME: Zinc & Copper 15mg       vitamin D3 (CHOLECALCIFEROL) 50 mcg (2000 units) tablet Take 50 mcg by mouth daily 2000 units        Zinc 30 MG TABS 30 mg         Allergies   Allergen Reactions     Apple Other (See Comments)     Apple Juice Other (See Comments)     Skin on roof of mouth got sensitive and then she noted sometimes a piece of skin from the roof of the mouth fell off as a result of this.      Melon Other (See Comments)     Oral reaction      Mold      Morphine Nausea and Vomiting and Other (See Comments)     Nsaids Hives and Other (See Comments)     Other Environmental Allergy      Pear Other (See Comments)     Tomato Hives and Other (See Comments)          Lab Results    Chemistry/lipid CBC Cardiac Enzymes/BNP/TSH/INR   Recent Labs   Lab Test  "10/13/23  1152 10/13/21  0957   CHOL  --  199   HDL  --  57   LDL  --  121   TRIG 159* 107     Recent Labs   Lab Test 10/13/21  0957        Recent Labs   Lab Test 03/22/24  1506      POTASSIUM 4.3   CHLORIDE 106   CO2 28   *   BUN 17   CR 1.00   GFRESTIMATED 67   ERICH 10.0     Recent Labs   Lab Test 03/22/24  1506 05/29/22  1134 10/13/21  0957   CR 1.00 0.65 0.78    Recent Labs   Lab Test 03/22/24  1506   WBC 7.0   HGB 13.9   HCT 42.6   MCV 96        Recent Labs   Lab Test 03/22/24  1506 10/19/22  1055 10/13/21  0957   HGB 13.9 13.5 14.1    No results for input(s): \"TROPONINI\" in the last 73509 hours.  No results for input(s): \"BNP\", \"NTBNPI\", \"NTBNP\" in the last 77080 hours.  Recent Labs   Lab Test 07/07/23  1109   TSH 0.85     No results for input(s): \"INR\" in the last 05716 hours.   49 minutes were spent on the date of encounter performing chart review, history and exam, documentation, and further activities as noted above.                                               Thank you for allowing me to participate in the care of your patient.      Sincerely,     GAMA Phillips CNP     [facility]  cc:   GAMA Phillips CNP  [address], [address]  [place] [address]      "

## 2024-12-19 NOTE — PATIENT INSTRUCTIONS
Carmel Fischer,    It was a pleasure to see you today at the [facility].     My recommendations after this visit include:    Plan for ILR (implantable loop recorder) implant    Sue Castillo CNP  [facility], Electrophysiology  [phone]  EP nurses [phone]

## 2024-12-19 NOTE — PROGRESS NOTES
AC: None- NA No med hold for ILR   Sue Castillo APRN CNP  P [facility] Ep Support Pool - [facility]  She would like to move forward with LISSETH.  MDT per Dr. Barber (see note on Zio result).  Thanks,  Sue    Carmel BRUCE Radhastacey 1970 [id number]  Home:[phone] (home) Cell:[phone] (mobile)  Emergency Contact: Holden Deleon   PCP: Dayana Calle, [phone]      Important patient information for CSC/Cath Lab staff : None    [facility] EP Cath Lab Procedure Order     Device Implant/Revision:  Procedure: ILR Implant  Current Device/Device Co Needed for Procedure: Pit My Pettronic   Ordering Provider: Dr Barber (Can be scheduled with any provider)  Date Ordered and Prepped: 12/19/2024 Alexandra Bowen RN  Diagnosis:  Syncope  Scheduling Timeframe:  For implants onlyt, schedule out at least 3 weeks for insurance approval for implant  Scheduling Restrictions: None  Scheduling Contact: Please contact pt to schedule, if you are unable to schedule date within the next 24 hours please contact pt to update on scheduling process  Cardiology Follow Up Apt s/p:  Standard Device follow up General Card @ 6mo (does not include New CRT/CSP/HIS)  Pre-Procedural Testing needed: None  Anesthesia:  None, No sedation only local anesthetic.    [facility] EP Cath Lab Prep   H&P:  Compled by cardiology on 12/19/24 if scheduled within 30 days, pt to schedule with PMD if procedure outside of this timeframe  NPO: Pt does NOT need to be NPO prior to procedure, pt can eat and drink up to procedure.  Pre-Procedure Labs/T&S: No labs required prior to procedure or AM of procedure.  Medical Records Pertinent for Procedure: None  Iodinated Contrast Dye Allergies (Does not include Shellfish, Egg, and/or Iodine Allergy)- excludes ILR/SICD: NA for procedure, pt does not receive dye for procedure.  Renal Protocol (GFR < 40ml/min, IV contrast past 2 days, EF < or = 25%)- excludes ILR/SICD: NA for procedure.  Medications: NO medication holds prior to procedure, pt to take all  AM medications (this is including all diabetic medication, multivitamins/supplements diuretics, and GLP-1 medications)    Allergies   Allergen Reactions    Apple Other (See Comments)    Apple Juice Other (See Comments)     Skin on roof of mouth got sensitive and then she noted sometimes a piece of skin from the roof of the mouth fell off as a result of this.     Melon Other (See Comments)     Oral reaction     Mold     Morphine Nausea and Vomiting and Other (See Comments)    Nsaids Hives and Other (See Comments)    Other Environmental Allergy     Pear Other (See Comments)    Tomato Hives and Other (See Comments)       Current Outpatient Medications:     adapalene (DIFFERIN) 0.1 % external cream, Apply pea-sized amount to face at bedtime. Start every other night for 1-2 weeks, then increase to nightly as tolerated., Disp: 45 g, Rfl: 11    ASHWAGANDHA PO, , Disp: , Rfl:     azelaic acid (FINACIA) 15 % external gel, Use 1-2 times daily to face, Disp: 50 g, Rfl: 11    azelastine (ASTELIN) 0.1 % nasal spray, Spray 1 spray into both nostrils daily as needed, Disp: , Rfl:     Cyanocobalamin (B-12) 1000 MCG CAPS, B12, Disp: , Rfl:     EC-RX Testosterone 0.2 % CREA, as directed topically Once a day, Disp: , Rfl:     ketotifen (ZADITOR) 0.025 % ophthalmic solution, Apply 1 drop to eye as needed, Disp: , Rfl:     levothyroxine (SYNTHROID) 125 MCG tablet, Take 1 tablet (125 mcg) by mouth daily, Disp: 90 tablet, Rfl: 3    MAGNESIUM GLYCINATE PO, Take 2 capsules by mouth daily, Disp: , Rfl:     Omega-3 Fatty Acids (FISH OIL) 1200 MG capsule, Take 1,200 mg by mouth daily, Disp: , Rfl:     progesterone (PROMETRIUM) 100 MG capsule, Take 150 mg by mouth daily. Takes at dinner, Disp: , Rfl:     traZODone (DESYREL) 50 MG tablet, trazodone 50 mg tablet  TAKE 1/2 TO 1 TABLET BY MOUTH AT BEDTIME, Disp: , Rfl:     UNABLE TO FIND, Take 22.5 mg by mouth daily MEDICATION NAME: Zinc Bisglyconate, Disp: , Rfl:     UNABLE TO FIND, Apply  topically 2 times daily MEDICATION NAME: Bi-estrogen (compound) BID, Disp: , Rfl:     UNABLE TO FIND, Take 1 tablet by mouth daily MEDICATION NAME: Zinc & Copper 15mg, Disp: , Rfl:     vitamin D3 (CHOLECALCIFEROL) 50 mcg (2000 units) tablet, Take 50 mcg by mouth daily 2000 units , Disp: , Rfl:     Zinc 30 MG TABS, 30 mg, Disp: , Rfl:     Documentation Date:12/19/2024 2:29 PM  Alexandra Bowen RN

## 2024-12-19 NOTE — PROGRESS NOTES
HEART CARE ELECTROPHYSIOLOGY NOTE      [facility]  [phone]      Assessment/Recommendations   Assessment/Plan:  1.  Palpitations: Increasing palpitations.  PACs, PVCs, PSVT documented on MCT's.  No documentation of sustained arrhythmia.  -- Recommend ILR implant    2.  Vasovagal presyncope/autonomic dysfunction: Recurrent episodes of presyncope, no denisse syncope.  Sudden onset presyncope, occurring while sitting, cannot rule out arrhythmia.  -- Aggressive daily hydration with a goal of 70 to 80 ounces daily of noncaffeinated, nonalcoholic beverage, more on hot days or with strenuous activity.    -- Liberalize sodium intake   -- Thigh-high compression stockings  -- Recommend ILR implant       History of Present Illness/Subjective    HPI: Carmel Fischer is a 54 year old female who comes in today for EP follow-up of presyncope, PSVT/palpitations.  She has a history of paroxysmal atrial tachycardia, PACs, rare PVCs, vasovagal presyncope, mild MICHAEL, anxiety, IBS.      Palpitations have been previously suppressed with low-dose propranolol, which she is also on for mood stabilization.  She is no longer taking propranolol as she prefers to use natural supplements, guided by a functional medicine practitioner.  She had more palpitations and hot flashes with L-theanine.  She also tried I tyrosine, but with morning adrenaline surge.  Frequency and duration of palpitations have been increasing.  On 3 or 4 occasions, she has felt like her heart stopped briefly associated with presyncope.  She has also had clusters of abnormal beats, skips, brief tachypalpitations.  She appears to have more episodes when having abdominal pressure or gas, or with getting lightheadedness upon position change.  More frequently, she is having episodes of sudden onset presyncope without any prodrome, occurring while seated.  She denies chest discomfort, palpitations, abdominal fullness/bloating or peripheral edema, shortness  of breath, paroxysmal nocturnal dyspnea, orthopnea, lightheadedness, dizziness, pre-syncope, or syncope.  She is planning to move to [place] in February.    Cardiographics (EKG, Apple Watch ECG personally reviewed):  EKG done 3/22/2024 shows sinus rhythm at 67 bpm,  ms, QT/QTc 392/404 ms    Apple Watch ECGs: Recording from 10/17/2024 is of poor quality, but consistent with sinus rhythm.  1 episode labeled atrial fibrillation was in fact sinus with mildly elevated rate.  Multiple ECG strips reviewed, no significant arrhythmia noted.    Zio monitoring from 10/28/2024 to 11/11/2024 (duration 14d 0h).  Predominant rhythm was sinus rhythm, 44 to 145bpm, average 79bpm.  30 episode(s) of nonsustained supraventricular tachycardia - longest 23.0s at 124bpm, fastest 19 beats at 226bpm.  No sustained tachyarrhythmias.  No atrial fibrillation.  There were no pauses of greater than 3 seconds.  Rare supraventricular ectopic beats (isolated <1%).  Rare premature ventricular contractions (isolated <1%).  Symptom triggers and diary entries (>108) correlated to sinus rhythm 68-118bpm with and without PACs, nonsustained SVT, PVCs.    Cardiac event monitoring from 5/31/2023 to 6/13/2023 (monitored duration 12d 13h 51m).  Baseline rhythm was sinus rhythm 70bpm.    Reported heart rate range 50 to 153bpm, average 66bpm.  32 symptom triggers correlated to sinus rhythm, sinus arrhythmia, 1 episode of nonsustained supraventricular tachycardia (6 beats, 153bpm), PACs (including couplets and triplets), PVCs.  No sustained tachyarrhythmias.  No atrial fibrillation.  There were no pauses noted.  Supraventricular and ventricular ectopic beat frequency are not reported on this monitoring modality.     Mobile cardiac telemetry monitoring from 6.28/2022 to 7/4/2022 (monitored duration 5d 20h 55m).  Predominant underlying rhythm was sinus rhythm.    Overall heart rate range 50 to 145bpm, average 80bpm.  Infrequent episodes (3 episodes reported)  of nonsustained atrial tachycardia - longest 8 beats, fastest 145bpm.  No sustained tachyarrhythmias  No atrial fibrillation  There were no pauses noted.  Rare supraventricular ectopic beats (<1%).  Rare premature ventricular contractions (1%).  Symptom triggers (53, palpitations, lightheadedness) correlated to sinus rhythm 74-126bpm with and without isolated PACs, nonsustained atrial tachycardia.    ECHO done 6/3/2021:  1. Normal left ventricular chamber size and systolic function with   estimated ejection fraction 60-65%.   2. Normal right ventricular cavity size and global systolic function.   3. Normal cardiac valve structure and function.   4. The right ventricular systolic pressure is estimated to be normal at   16-21 mmHg.   5. There is no significant change from the previous exam performed on   12/23/2019.     I have reviewed and updated the patient's Past Medical History, Social History, Family History and Medication List.     Physical Examination  Review of Systems   Vitals: BP 94/58 (BP Location: Left arm, Patient Position: Sitting, Cuff Size: Adult Regular)   Pulse 75   Resp 16   Wt 62.1 kg (137 lb)   BMI 20.83 kg/m    BMI= Body mass index is 20.83 kg/m .  Wt Readings from Last 3 Encounters:   12/19/24 62.1 kg (137 lb)   10/24/24 61.7 kg (136 lb)   10/26/23 63.5 kg (139 lb 14.4 oz)       General Appearance:   Alert, well-appearing and in no acute distress.   HEENT: Atraumatic, normocephalic.  No scleral icterus, normal conjunctivae, EOMs intact, PERRL.  Mucous membranes pink and moist.     Chest/Lungs:   Chest symmetric, spine straight.  Respirations unlabored.  Lungs are clear to auscultation.   Cardiovascular:   Regular rate and rhythm.  Normal first and second heart sounds with no murmurs, rubs, or gallops; radial pulses are intact, no edema.   Abdomen:  Soft, nondistended.   Extremities: No cyanosis or clubbing.   Musculoskeletal: Moves all extremities.     Skin: Warm, dry, intact.    Neurologic:  Mood and affect are appropriate.  Alert and oriented to person, place, time, and situation.     ROS: 10 point ROS neg other than the symptoms noted above in the HPI.        Medical History  Surgical History Family History Social History   Past Medical History:   Diagnosis Date    Auditory processing disorder 2021    Autonomic dysfunction 2021    Bruxism 2021    Female stress incontinence 2020    NAPOLEON (generalized anxiety disorder)     Hypoglycemia 2021    Hypothyroid     Hypothyroidism 2021    IBS (irritable bowel syndrome)     Insomnia 2021    Irritable bowel syndrome 2021    mild MICHAEL (obstructive sleep apnea)     Plantar fasciitis 2021    PVC's (premature ventricular contractions) 2021    Just noted on holter    PVD (posterior vitreous detachment), bilateral 2021    Rosacea 2021    Seasonal allergic rhinitis 2021    SVT (supraventricular tachycardia) (H)     Syncope 2021    Syringomyelia (H) 2021    Vasovagal reaction      Past Surgical History:   Procedure Laterality Date     SECTION      WISDOM TOOTH EXTRACTION       Family History   Problem Relation Age of Onset    Ovarian Cancer Mother     Hypertension Mother     Atrial fibrillation Father     Cerebrovascular Disease Father     Dementia Father     Myocardial Infarction Maternal Grandmother     Cancer Paternal Grandfather     Sleep Apnea Brother     Colon Cancer No family hx of     Breast Cancer No family hx of     Skin Cancer No family hx of         Social History     Socioeconomic History    Marital status: Single     Spouse name: Not on file    Number of children: Not on file    Years of education: Not on file    Highest education level: Not on file   Occupational History    Occupation: homemaker    Occupation: student:    Tobacco Use    Smoking status: Never     Passive exposure: Never    Smokeless tobacco: Never   Substance and Sexual Activity    Alcohol use: Not  Currently    Drug use: Not Currently    Sexual activity: Not Currently   Other Topics Concern    Parent/sibling w/ CABG, MI or angioplasty before 65F 55M? Not Asked   Social History Narrative    Not on file     Social Drivers of Health     Financial Resource Strain: Low Risk  (11/1/2023)    Received from [facility] [employer] [facility] [employer] [facility], [facility] [employer] [facility]    Financial Resource Strain     Difficulty of Paying Living Expenses: 3     Difficulty of Paying Living Expenses: Not on file   Food Insecurity: No Food Insecurity (11/1/2023)    Received from [facility] [employer] [facility]    Food Insecurity     Do you worry your food will run out before you are able to buy more?: 1   Transportation Needs: No Transportation Needs (11/1/2023)    Received from [facility] [facility]    Transportation Needs     Does lack of transportation keep you from medical appointments?: 1     Does lack of transportation keep you from work, meetings or getting things that you need?: 1   Physical Activity: Not on file   Stress: Not on file   Social Connections: Socially Isolated (11/1/2023)    Received from [facility] [employer] [facility]    Social Connections     Do you often feel lonely or isolated from those around you?: 4   Interpersonal Safety: Not on file   Housing Stability: Low Risk  (11/1/2023)    Received from [employer] [facility]    Housing Stability     What is your housing situation today?: 1           Medications  Allergies   Current Outpatient Medications   Medication Sig Dispense Refill    adapalene (DIFFERIN) 0.1 % external cream Apply pea-sized amount to face at bedtime. Start every other night for 1-2 weeks, then increase to nightly as tolerated. 45 g 11    ASHWAGANDHA PO       azelaic acid (FINACIA) 15 % external gel Use 1-2 times daily to face 50 g 11    azelastine (ASTELIN) 0.1 % nasal spray Spray 1 spray into both  nostrils daily as needed      Cyanocobalamin (B-12) 1000 MCG CAPS B12      EC-RX Testosterone 0.2 % CREA as directed topically Once a day      ketotifen (ZADITOR) 0.025 % ophthalmic solution Apply 1 drop to eye as needed      levothyroxine (SYNTHROID) 125 MCG tablet Take 1 tablet (125 mcg) by mouth daily 90 tablet 3    MAGNESIUM GLYCINATE PO Take 2 capsules by mouth daily      Omega-3 Fatty Acids (FISH OIL) 1200 MG capsule Take 1,200 mg by mouth daily      progesterone (PROMETRIUM) 100 MG capsule Take 150 mg by mouth daily. Takes at dinner      traZODone (DESYREL) 50 MG tablet trazodone 50 mg tablet   TAKE 1/2 TO 1 TABLET BY MOUTH AT BEDTIME      UNABLE TO FIND Take 22.5 mg by mouth daily MEDICATION NAME: Zinc Bisglyconate      UNABLE TO FIND Apply topically 2 times daily MEDICATION NAME: Bi-estrogen (compound) BID      UNABLE TO FIND Take 1 tablet by mouth daily MEDICATION NAME: Zinc & Copper 15mg      vitamin D3 (CHOLECALCIFEROL) 50 mcg (2000 units) tablet Take 50 mcg by mouth daily 2000 units       Zinc 30 MG TABS 30 mg         Allergies   Allergen Reactions    Apple Other (See Comments)    Apple Juice Other (See Comments)     Skin on roof of mouth got sensitive and then she noted sometimes a piece of skin from the roof of the mouth fell off as a result of this.     Melon Other (See Comments)     Oral reaction     Mold     Morphine Nausea and Vomiting and Other (See Comments)    Nsaids Hives and Other (See Comments)    Other Environmental Allergy     Pear Other (See Comments)    Tomato Hives and Other (See Comments)          Lab Results    Chemistry/lipid CBC Cardiac Enzymes/BNP/TSH/INR   Recent Labs   Lab Test 10/13/23  1152 10/13/21  0957   CHOL  --  199   HDL  --  57   LDL  --  121   TRIG 159* 107     Recent Labs   Lab Test 10/13/21  0957        Recent Labs   Lab Test 03/22/24  1506      POTASSIUM 4.3   CHLORIDE 106   CO2 28   *   BUN 17   CR 1.00   GFRESTIMATED 67   ERICH 10.0     Recent  "Labs   Lab Test 03/22/24  1506 05/29/22  1134 10/13/21  0957   CR 1.00 0.65 0.78    Recent Labs   Lab Test 03/22/24  1506   WBC 7.0   HGB 13.9   HCT 42.6   MCV 96        Recent Labs   Lab Test 03/22/24  1506 10/19/22  1055 10/13/21  0957   HGB 13.9 13.5 14.1    No results for input(s): \"TROPONINI\" in the last 32255 hours.  No results for input(s): \"BNP\", \"NTBNPI\", \"NTBNP\" in the last 27146 hours.  Recent Labs   Lab Test 07/07/23  1109   TSH 0.85     No results for input(s): \"INR\" in the last 90108 hours.   49 minutes were spent on the date of encounter performing chart review, history and exam, documentation, and further activities as noted above.                                           "

## 2024-12-20 ENCOUNTER — HOSPITAL ENCOUNTER (OUTPATIENT)
Facility: HOSPITAL | Age: 54
End: 2024-12-20
Attending: NURSE PRACTITIONER | Admitting: INTERNAL MEDICINE
Payer: COMMERCIAL

## 2024-12-20 DIAGNOSIS — R55 SYNCOPE, UNSPECIFIED SYNCOPE TYPE: ICD-10-CM

## 2024-12-30 ENCOUNTER — MEDICAL CORRESPONDENCE (OUTPATIENT)
Dept: HEALTH INFORMATION MANAGEMENT | Facility: CLINIC | Age: 54
End: 2024-12-30
Payer: COMMERCIAL

## 2025-01-28 ENCOUNTER — TELEPHONE (OUTPATIENT)
Dept: CARDIOLOGY | Facility: CLINIC | Age: 55
End: 2025-01-28
Payer: COMMERCIAL

## 2025-01-28 NOTE — TELEPHONE ENCOUNTER
[facility] Call Center    Phone Message    May a detailed message be left on voicemail: yes     Reason for Call: Other: Pt is calling to inform that the procedure is too close to the date that she is taking her trip and that she is dropping her appeal to insurance for coverage.     Action Taken: Other: cardio    Travel Screening: Not Applicable    Thank you!  [facility]       Date of Service:

## 2025-01-29 NOTE — TELEPHONE ENCOUNTER
1st Attempt to return call to pt. LVM with direct number requesting return call.     Calling pt to clarify if she would like to cancel and not reschedule at all, or reschedule for a later time.    Pt currently has ILR implant scheduled 2/20.    Katelyn Martinez RN

## 2025-01-29 NOTE — TELEPHONE ENCOUNTER
PC back from patient  She declines need to reschedule loop as she is moving out of state.  All appointments cancelled.  CHARMAINE

## 2025-02-17 ENCOUNTER — MEDICAL CORRESPONDENCE (OUTPATIENT)
Dept: HEALTH INFORMATION MANAGEMENT | Facility: CLINIC | Age: 55
End: 2025-02-17
Payer: COMMERCIAL

## 2025-02-20 ENCOUNTER — TRANSCRIBE ORDERS (OUTPATIENT)
Dept: OTHER | Age: 55
End: 2025-02-20

## 2025-02-20 DIAGNOSIS — E03.9 HYPOTHYROID: Primary | ICD-10-CM

## 2025-03-25 NOTE — PROGRESS NOTES
Congestive Heart Failure Clinic   [facility]      Referring Provider: Dr Radha Reina  Primary Care Physician: Yuri Villalobos MD   Cardiologist: Dr Radha Reina  Nephrologist: N/A      HISTORY OF PRESENT ILLNESS:     Dilcia Mcadams is a 73 y.o. (1951) female with a history of HFpEF (EF> 50%), most recent EF:63% on 6-3-2   2-27-25 EF 66%  Lab Results   Component Value Date    LVEF 66 03/25/2025    LVEFMODE Echo 03/25/2025       Patient presents to the CHF clinic for ongoing evaluation and monitoring of heart failure.  In the CHF clinic today she denies any adverse symptoms except:  [] Dizziness or lightheadedness   [] Syncope or near syncope  [] Recent Fall  [] Shortness of breath at rest   [x] Dyspnea with exertion recovers with rest  [] Decline in functional capacity (unable to perform activities they had previously been able to do)  [] Fatigue   [] Orthopnea  [] PND  [] Nocturnal cough  [] Hemoptysis  [] Chest pain, pressure, or discomfort  [] Palpitations  [] Abdominal distention  [] Abdominal bloating  [] Early satiety  [] Blood in stool   [] Diarrhea  [] Constipation  [] Nausea/Vomiting  [] Decreased urinary response to oral diuretic   [] Scrotal swelling   [] Lower extremity edema -top of feet  [] Used PRN doses of oral diuretic   [] Weight gain    Wt Readings from Last 3 Encounters:   03/25/25 85.3 kg (188 lb)   02/27/25 84.4 kg (186 lb)   02/27/25 84.4 kg (186 lb)     SOCIAL HISTORY:  [x] Denies tobacco, alcohol or illicit drug abuse  [] Tobacco use:  [] ETOH use:  [] Illicit drug use:        MEDICATIONS:    Allergies   Allergen Reactions    Penicillins Anaphylaxis and Shortness Of Breath    Sulfa Antibiotics Itching and Swelling     Prior to Visit Medications    Medication Sig Taking? Authorizing Provider   metoprolol succinate (TOPROL XL) 25 MG extended release tablet Take 1 tablet by mouth daily Yes Tiana Lauren, APRN - CNP   bisacodyl (DULCOLAX)  "   08/27/21 0900   Signing Clinician's Name / Credentials   Signing clinician's name / credentials Crys Mullins, PT   Session Number   Session Number 2/8   Ortho Goal 1   Goal Description Patient will be independent with HEP and self management of condition in 8 weeks.    Ortho Goal 2   Goal Description patient will demonstrate correct posture in standing/sitting without cues in 6 weeks.    Ortho Goal 3   Goal Description Patient will report 50% decrease in nocturnal arm symptoms in 8 weeks.    Subjective Report   Subjective Report Had increased tingling in arms, possibly in legs after last session. Also drove 3 hours to the cabin. Has not been regular with home program. Has started classes and been out of town. increased pain with leaning forward to work on puzzles, rolling russel coils.    Objective Measure 1   Details significant scapular winging R>L with hand on wall just below shoulder height.    Therapeutic Procedure/exercise   Therapeutic Procedures: strength, endurance, ROM, flexibillity minutes (36753) 25   Skilled Intervention ther ex   Treatment Detail UT stretch 2 x 30\", levator stretch 2 x 30\", scap retr/hands on wall w/slight wt shift,    Education   Learner Patient   Readiness Acceptance   Method Booklet/handout;Demonstration   Response Demonstrates Understanding   Plan   Homework Previous HEP: sup cerv rot, scap retr, UT/levator stretchs, rows, (B) ER/band, wall clock/band, flex/serratus/band   Home program scap retraction 10 x 5\", prone LT retraining w/arm lift, 8-10 x 5-10\", rows 10 x orange, pec stretch/corner 5 x 10-15\" , (B) UT stretch    Plan for next session Continue with initial POC. Progress with HEP, posture education/strengthening, additional assessment as needed.    Comments   Comments ref provider: Dayana Calle DO   Total Session Time   Timed Code Treatment Minutes 25   Total Treatment Time (sum of timed and untimed services) 25   AMBULATORY CLINICS ONLY-MEDICAL AND TREATMENT DIAGNOSIS "   PT Diagnosis Syringomyelia, poor posture, scapular winging